# Patient Record
Sex: MALE | Race: WHITE | NOT HISPANIC OR LATINO
[De-identification: names, ages, dates, MRNs, and addresses within clinical notes are randomized per-mention and may not be internally consistent; named-entity substitution may affect disease eponyms.]

---

## 2018-03-09 PROBLEM — Z00.00 ENCOUNTER FOR PREVENTIVE HEALTH EXAMINATION: Status: ACTIVE | Noted: 2018-03-09

## 2018-04-06 ENCOUNTER — APPOINTMENT (OUTPATIENT)
Dept: OPHTHALMOLOGY | Facility: CLINIC | Age: 78
End: 2018-04-06
Payer: MEDICARE

## 2018-04-06 PROCEDURE — 92014 COMPRE OPH EXAM EST PT 1/>: CPT

## 2018-04-13 ENCOUNTER — APPOINTMENT (OUTPATIENT)
Dept: OPHTHALMOLOGY | Facility: CLINIC | Age: 78
End: 2018-04-13
Payer: MEDICARE

## 2018-04-13 DIAGNOSIS — H00.015 HORDEOLUM EXTERNUM LEFT LOWER EYELID: ICD-10-CM

## 2018-04-13 PROCEDURE — 92012 INTRM OPH EXAM EST PATIENT: CPT

## 2018-11-08 ENCOUNTER — APPOINTMENT (OUTPATIENT)
Dept: OPHTHALMOLOGY | Facility: CLINIC | Age: 78
End: 2018-11-08
Payer: MEDICARE

## 2018-11-08 DIAGNOSIS — H04.123 DRY EYE SYNDROME OF BILATERAL LACRIMAL GLANDS: ICD-10-CM

## 2018-11-08 DIAGNOSIS — H25.013 CORTICAL AGE-RELATED CATARACT, BILATERAL: ICD-10-CM

## 2018-11-08 DIAGNOSIS — H35.3131 NONEXUDATIVE AGE-RELATED MACULAR DEGENERATION, BILATERAL, EARLY DRY STAGE: ICD-10-CM

## 2018-11-08 PROCEDURE — 92134 CPTRZ OPH DX IMG PST SGM RTA: CPT

## 2018-11-08 PROCEDURE — 92014 COMPRE OPH EXAM EST PT 1/>: CPT

## 2018-11-14 ENCOUNTER — TRANSCRIPTION ENCOUNTER (OUTPATIENT)
Age: 78
End: 2018-11-14

## 2019-05-16 ENCOUNTER — APPOINTMENT (OUTPATIENT)
Dept: OPHTHALMOLOGY | Facility: CLINIC | Age: 79
End: 2019-05-16
Payer: MEDICARE

## 2019-05-16 ENCOUNTER — NON-APPOINTMENT (OUTPATIENT)
Age: 79
End: 2019-05-16

## 2019-05-16 PROCEDURE — 92014 COMPRE OPH EXAM EST PT 1/>: CPT

## 2019-05-16 PROCEDURE — 92134 CPTRZ OPH DX IMG PST SGM RTA: CPT

## 2019-12-02 ENCOUNTER — NON-APPOINTMENT (OUTPATIENT)
Age: 79
End: 2019-12-02

## 2019-12-02 ENCOUNTER — APPOINTMENT (OUTPATIENT)
Dept: OPHTHALMOLOGY | Facility: CLINIC | Age: 79
End: 2019-12-02
Payer: MEDICARE

## 2019-12-02 PROCEDURE — 92014 COMPRE OPH EXAM EST PT 1/>: CPT

## 2019-12-02 PROCEDURE — 92134 CPTRZ OPH DX IMG PST SGM RTA: CPT

## 2020-05-07 ENCOUNTER — APPOINTMENT (OUTPATIENT)
Dept: OPHTHALMOLOGY | Facility: CLINIC | Age: 80
End: 2020-05-07

## 2021-04-06 ENCOUNTER — NON-APPOINTMENT (OUTPATIENT)
Age: 81
End: 2021-04-06

## 2021-04-19 ENCOUNTER — NON-APPOINTMENT (OUTPATIENT)
Age: 81
End: 2021-04-19

## 2021-04-19 ENCOUNTER — APPOINTMENT (OUTPATIENT)
Dept: OPHTHALMOLOGY | Facility: CLINIC | Age: 81
End: 2021-04-19
Payer: MEDICARE

## 2021-04-19 PROCEDURE — 92014 COMPRE OPH EXAM EST PT 1/>: CPT

## 2022-01-22 ENCOUNTER — NON-APPOINTMENT (OUTPATIENT)
Age: 82
End: 2022-01-22

## 2022-04-20 ENCOUNTER — APPOINTMENT (OUTPATIENT)
Dept: OPHTHALMOLOGY | Facility: CLINIC | Age: 82
End: 2022-04-20
Payer: MEDICARE

## 2022-04-20 ENCOUNTER — NON-APPOINTMENT (OUTPATIENT)
Age: 82
End: 2022-04-20

## 2022-04-20 PROCEDURE — 92014 COMPRE OPH EXAM EST PT 1/>: CPT

## 2022-05-17 ENCOUNTER — APPOINTMENT (OUTPATIENT)
Dept: OPHTHALMOLOGY | Facility: CLINIC | Age: 82
End: 2022-05-17
Payer: MEDICARE

## 2022-05-17 ENCOUNTER — NON-APPOINTMENT (OUTPATIENT)
Age: 82
End: 2022-05-17

## 2022-05-17 PROCEDURE — 99199 UNLISTED SPECIAL SVC PX/RPRT: CPT | Mod: NC

## 2022-07-16 ENCOUNTER — NON-APPOINTMENT (OUTPATIENT)
Age: 82
End: 2022-07-16

## 2022-07-27 ENCOUNTER — APPOINTMENT (OUTPATIENT)
Dept: OPHTHALMOLOGY | Facility: CLINIC | Age: 82
End: 2022-07-27

## 2022-07-27 ENCOUNTER — NON-APPOINTMENT (OUTPATIENT)
Age: 82
End: 2022-07-27

## 2022-07-27 PROCEDURE — 99199 UNLISTED SPECIAL SVC PX/RPRT: CPT | Mod: NC

## 2022-12-07 ENCOUNTER — NON-APPOINTMENT (OUTPATIENT)
Age: 82
End: 2022-12-07

## 2023-01-20 ENCOUNTER — APPOINTMENT (OUTPATIENT)
Dept: OPHTHALMOLOGY | Facility: CLINIC | Age: 83
End: 2023-01-20
Payer: MEDICARE

## 2023-01-20 ENCOUNTER — NON-APPOINTMENT (OUTPATIENT)
Age: 83
End: 2023-01-20

## 2023-01-20 PROCEDURE — 92133 CPTRZD OPH DX IMG PST SGM ON: CPT

## 2023-01-20 PROCEDURE — 92014 COMPRE OPH EXAM EST PT 1/>: CPT

## 2023-04-26 ENCOUNTER — APPOINTMENT (OUTPATIENT)
Dept: OPHTHALMOLOGY | Facility: CLINIC | Age: 83
End: 2023-04-26

## 2023-05-20 ENCOUNTER — EMERGENCY (EMERGENCY)
Facility: HOSPITAL | Age: 83
LOS: 1 days | Discharge: HOME | End: 2023-05-20
Attending: EMERGENCY MEDICINE | Admitting: EMERGENCY MEDICINE
Payer: MEDICARE

## 2023-05-20 VITALS
SYSTOLIC BLOOD PRESSURE: 125 MMHG | TEMPERATURE: 99 F | WEIGHT: 218.04 LBS | HEIGHT: 69 IN | OXYGEN SATURATION: 97 % | DIASTOLIC BLOOD PRESSURE: 76 MMHG | RESPIRATION RATE: 17 BRPM | HEART RATE: 76 BPM

## 2023-05-20 VITALS
RESPIRATION RATE: 18 BRPM | SYSTOLIC BLOOD PRESSURE: 126 MMHG | TEMPERATURE: 99 F | HEART RATE: 72 BPM | OXYGEN SATURATION: 98 % | DIASTOLIC BLOOD PRESSURE: 78 MMHG

## 2023-05-20 DIAGNOSIS — Z79.84 LONG TERM (CURRENT) USE OF ORAL HYPOGLYCEMIC DRUGS: ICD-10-CM

## 2023-05-20 DIAGNOSIS — Z79.02 LONG TERM (CURRENT) USE OF ANTITHROMBOTICS/ANTIPLATELETS: ICD-10-CM

## 2023-05-20 DIAGNOSIS — E11.9 TYPE 2 DIABETES MELLITUS WITHOUT COMPLICATIONS: ICD-10-CM

## 2023-05-20 DIAGNOSIS — E78.5 HYPERLIPIDEMIA, UNSPECIFIED: ICD-10-CM

## 2023-05-20 DIAGNOSIS — H53.8 OTHER VISUAL DISTURBANCES: ICD-10-CM

## 2023-05-20 DIAGNOSIS — Z86.73 PERSONAL HISTORY OF TRANSIENT ISCHEMIC ATTACK (TIA), AND CEREBRAL INFARCTION WITHOUT RESIDUAL DEFICITS: ICD-10-CM

## 2023-05-20 DIAGNOSIS — I10 ESSENTIAL (PRIMARY) HYPERTENSION: ICD-10-CM

## 2023-05-20 LAB
ANION GAP SERPL CALC-SCNC: 12 MMOL/L — SIGNIFICANT CHANGE UP (ref 5–17)
APTT BLD: 31 SEC — SIGNIFICANT CHANGE UP (ref 27.5–35.5)
BASOPHILS # BLD AUTO: 0.02 K/UL — SIGNIFICANT CHANGE UP (ref 0–0.2)
BASOPHILS NFR BLD AUTO: 0.4 % — SIGNIFICANT CHANGE UP (ref 0–2)
BUN SERPL-MCNC: 17 MG/DL — SIGNIFICANT CHANGE UP (ref 7–23)
CALCIUM SERPL-MCNC: 8.6 MG/DL — SIGNIFICANT CHANGE UP (ref 8.4–10.5)
CHLORIDE SERPL-SCNC: 105 MMOL/L — SIGNIFICANT CHANGE UP (ref 96–108)
CO2 SERPL-SCNC: 22 MMOL/L — SIGNIFICANT CHANGE UP (ref 22–31)
CREAT SERPL-MCNC: 1.31 MG/DL — HIGH (ref 0.5–1.3)
EGFR: 55 ML/MIN/1.73M2 — LOW
EOSINOPHIL # BLD AUTO: 0.04 K/UL — SIGNIFICANT CHANGE UP (ref 0–0.5)
EOSINOPHIL NFR BLD AUTO: 0.8 % — SIGNIFICANT CHANGE UP (ref 0–6)
GLUCOSE SERPL-MCNC: 103 MG/DL — HIGH (ref 70–99)
HCT VFR BLD CALC: 39.3 % — SIGNIFICANT CHANGE UP (ref 39–50)
HGB BLD-MCNC: 13.5 G/DL — SIGNIFICANT CHANGE UP (ref 13–17)
IMM GRANULOCYTES NFR BLD AUTO: 0.4 % — SIGNIFICANT CHANGE UP (ref 0–0.9)
INR BLD: 1.07 — SIGNIFICANT CHANGE UP (ref 0.88–1.16)
LYMPHOCYTES # BLD AUTO: 0.91 K/UL — LOW (ref 1–3.3)
LYMPHOCYTES # BLD AUTO: 17.8 % — SIGNIFICANT CHANGE UP (ref 13–44)
MCHC RBC-ENTMCNC: 32.5 PG — SIGNIFICANT CHANGE UP (ref 27–34)
MCHC RBC-ENTMCNC: 34.4 GM/DL — SIGNIFICANT CHANGE UP (ref 32–36)
MCV RBC AUTO: 94.7 FL — SIGNIFICANT CHANGE UP (ref 80–100)
MONOCYTES # BLD AUTO: 0.26 K/UL — SIGNIFICANT CHANGE UP (ref 0–0.9)
MONOCYTES NFR BLD AUTO: 5.1 % — SIGNIFICANT CHANGE UP (ref 2–14)
NEUTROPHILS # BLD AUTO: 3.87 K/UL — SIGNIFICANT CHANGE UP (ref 1.8–7.4)
NEUTROPHILS NFR BLD AUTO: 75.5 % — SIGNIFICANT CHANGE UP (ref 43–77)
NRBC # BLD: 0 /100 WBCS — SIGNIFICANT CHANGE UP (ref 0–0)
PLATELET # BLD AUTO: 164 K/UL — SIGNIFICANT CHANGE UP (ref 150–400)
POTASSIUM SERPL-MCNC: 4.3 MMOL/L — SIGNIFICANT CHANGE UP (ref 3.5–5.3)
POTASSIUM SERPL-SCNC: 4.3 MMOL/L — SIGNIFICANT CHANGE UP (ref 3.5–5.3)
PROTHROM AB SERPL-ACNC: 12.8 SEC — SIGNIFICANT CHANGE UP (ref 10.5–13.4)
RBC # BLD: 4.15 M/UL — LOW (ref 4.2–5.8)
RBC # FLD: 12.7 % — SIGNIFICANT CHANGE UP (ref 10.3–14.5)
SODIUM SERPL-SCNC: 139 MMOL/L — SIGNIFICANT CHANGE UP (ref 135–145)
TROPONIN T SERPL-MCNC: 0.01 NG/ML — SIGNIFICANT CHANGE UP (ref 0–0.01)
WBC # BLD: 5.12 K/UL — SIGNIFICANT CHANGE UP (ref 3.8–10.5)
WBC # FLD AUTO: 5.12 K/UL — SIGNIFICANT CHANGE UP (ref 3.8–10.5)

## 2023-05-20 PROCEDURE — 85610 PROTHROMBIN TIME: CPT

## 2023-05-20 PROCEDURE — 82962 GLUCOSE BLOOD TEST: CPT

## 2023-05-20 PROCEDURE — 70450 CT HEAD/BRAIN W/O DYE: CPT | Mod: MG

## 2023-05-20 PROCEDURE — 85025 COMPLETE CBC W/AUTO DIFF WBC: CPT

## 2023-05-20 PROCEDURE — 84484 ASSAY OF TROPONIN QUANT: CPT

## 2023-05-20 PROCEDURE — 80048 BASIC METABOLIC PNL TOTAL CA: CPT

## 2023-05-20 PROCEDURE — 36415 COLL VENOUS BLD VENIPUNCTURE: CPT

## 2023-05-20 PROCEDURE — G1004: CPT

## 2023-05-20 PROCEDURE — 70498 CT ANGIOGRAPHY NECK: CPT | Mod: MG

## 2023-05-20 PROCEDURE — 70496 CT ANGIOGRAPHY HEAD: CPT | Mod: MG

## 2023-05-20 PROCEDURE — 99284 EMERGENCY DEPT VISIT MOD MDM: CPT | Mod: FS

## 2023-05-20 PROCEDURE — 70498 CT ANGIOGRAPHY NECK: CPT | Mod: 26,MG

## 2023-05-20 PROCEDURE — 70496 CT ANGIOGRAPHY HEAD: CPT | Mod: 26,MG

## 2023-05-20 PROCEDURE — 70450 CT HEAD/BRAIN W/O DYE: CPT | Mod: 26,MG,XE

## 2023-05-20 PROCEDURE — 85730 THROMBOPLASTIN TIME PARTIAL: CPT

## 2023-05-20 PROCEDURE — 99285 EMERGENCY DEPT VISIT HI MDM: CPT | Mod: 25

## 2023-05-20 NOTE — ED ADULT NURSE NOTE - NSFALLHARMRISKINTERV_ED_ALL_ED

## 2023-05-20 NOTE — ED PROVIDER NOTE - ATTENDING APP SHARED VISIT CONTRIBUTION OF CARE
81 y/o m with PMH of DM, HLD, TIA on plavix presents to ED with left eye vision changes since 10am.  Pt states he went to bed at 1am and woke at 10am with feeling of film over left eye.  Denies pain, headache, nausea, vomiting, flashes of light, curtain over vision.  No unilateral weakness or numbness.  Pt states he was dx with early glaucoma but not on drops.    PE - well appearing left eye not injected EOMI pressure 12-14, PERRL, no periorbital tenderness, no tearing, no focal deficits on exam  Alert & Oriented x 3. CN II-XII intact. No facial droop. Clear speech. OLMOS w/ 5/5 strength x 4 ext. Normal sensation. No pronator drift. No dysdidokinesia nor dysmetria. Normal heel-to-shin.    Plan for labs, CT head, CTA head/neck.    Symptoms most likely c.w intraocular issue as pt has no other sign of stroke on exam  Pt has follow up with neuro Tuesday and encouraged to see optho as outpt.  Signed out to Dr. Edouard pending CTA head/neck.

## 2023-05-20 NOTE — ED ADULT TRIAGE NOTE - RESPIRATORY RATE (BREATHS/MIN)
Your opinion matters! Thank you for choosing [facility] for your care. You might receive a survey through e-mail or text message about your experience today to evaluate our clinic. Please take a few minutes to complete the survey, the feedback is important.     It was a pleasure to talk with you today.     Yu Mace LCSW    19

## 2023-05-20 NOTE — ED PROVIDER NOTE - NEUROLOGICAL, MLM
Alert and oriented, no focal deficits, no motor or sensory deficits. + finger to nose, normal gait, romberg neg

## 2023-05-20 NOTE — ED PROVIDER NOTE - NSFOLLOWUPINSTRUCTIONS_ED_ALL_ED_FT
follow up with your neurologist as scheduled, please see your eye doctor as well   Visual Disturbances  An eye with a detached retina.  A visual disturbance is any problem that interferes with your normal vision. This can affect one eye or both eyes. Some types of visual disturbances come and go without treatment and do not cause a permanent problem. Other visual disturbances may be a sign of an eye emergency or a medical emergency.  Visual disturbances include:  Blurred vision.  Being unable to see certain colors.  Being sensitive to light.  Double vision in one eye or both eyes.  Partial vision loss (visual field deficit).  Being unaware of objects on one side of the body (visual spatial inattention).  Rhythmic eye movements that you cannot control (nystagmus).  Short-term or long-term blindness.  Seeing:  Floating spots or lines (floaters).  Flashing or shimmering lights.  Zigzagging lines or stars.  The floor as tilted (visual midline shift).  Things that are not really there (hallucinations).  Causes of visual disturbances include:  Dry eyes.  Eye infection.  The thin membrane at the back of the eye  from the eyeball (retinal detachment).  High blood pressure.  Migraine.  Glaucoma.  Ischemic stroke.  Cerebral aneurysm.  Diabetes.  It is important to get your eyes checked by a health care provider or eye care specialist (ophthalmologist or optometrist) as soon as possible to determine the cause of your visual disturbance.  Follow these instructions at home:  Take over-the-counter and prescription medicines only as told by your health care provider.  Do not use any products that contain nicotine or tobacco. These products include cigarettes, chewing tobacco, and vaping devices, such as e-cigarettes. If you need help quitting, ask your health care provider.  To lower your risk of the problems that can lead to visual disturbances:  Eat a balanced diet that includes fruits and vegetables, whole grains, lean meat, and low-fat dairy.  Maintain a healthy weight. Work with your health care provider to lose weight if you need to.  Exercise regularly. Ask your health care provider what activities are safe for you.  Do not drive if you have trouble seeing. Ask your health care provider for guidance about when it is and is not safe for you to drive.  Keep all follow-up visits. This is important.  Contact a health care provider if:  Your visual disturbance changes or becomes worse.  Get help right away if:  A sign showing the "BE FAST" categories for the warning signs of a stroke: balance, eyes, face, arms, speech, and time.  You have new visual disturbances.  You suddenly see flashing lights or floaters.  You suddenly have a dark area in your field of vision, especially in the lower part. This can lead to a loss of central vision.  You suddenly lose vision in one or both eyes.  You have any symptoms of a stroke. "BE FAST" is an easy way to remember the main warning signs of a stroke:  B - Balance. Signs are dizziness, sudden trouble walking, or loss of balance.  E - Eyes. Signs are trouble seeing or a sudden change in vision.  F - Face. Signs are sudden weakness or numbness of the face, or the face or eyelid drooping on one side.  A - Arms. Signs are weakness or numbness in an arm. This happens suddenly and usually on one side of the body.  S - Speech. Signs are sudden trouble speaking, slurred speech, or trouble understanding what people say.  T - Time. Time to call emergency services. Write down what time symptoms started.  Have other signs of a stroke, such as:  A sudden, severe headache with no known cause.  Nausea or vomiting.  A seizure.  These symptoms may represent a serious problem that is an emergency. Do not wait to see if the symptoms will go away. Get medical help right away. Call your local emergency services (911 in the [place] Do not drive yourself to the hospital.  Summary  A visual disturbance is any problem that interferes with your normal vision.  It is important to get your eyes checked by a health care provider or eye care specialist to determine what kind of visual disturbance you have.  Some visual disturbances may be a sign of an eye emergency or medical emergency.

## 2023-05-20 NOTE — ED PROVIDER NOTE - NS ED ATTENDING STATEMENT MOD
This was a shared visit with the EMILIA. I reviewed and verified the documentation and independently performed the documented:

## 2023-05-20 NOTE — ED PROVIDER NOTE - PATIENT PORTAL LINK FT
You can access the FollowMyHealth Patient Portal offered by [facility] by registering at the following website: [email or web address]. By joining [facility]’s FollowMyHealth portal, you will also be able to view your health information using other applications (apps) compatible with our system.

## 2023-05-20 NOTE — ED PROVIDER NOTE - EYES, MLM
Clear bilaterally, pupils equal, round and reactive to light. + EOM b/l, no conjunctival injection or tearing b/l

## 2023-05-20 NOTE — ED PROVIDER NOTE - OBJECTIVE STATEMENT
The pt is a 83 y/o M, who presents to ED c/o "blurry vision" to L eye since 10 am - pt states that has had some visual disturbances on/off x 1wk, but today symptoms are persistent. Pt defines blurry as fuzzy - not double, no loss of visual fields. PMH HTN, HLD, DM, TIA (plavix). Denies h/a, visual loss, hearing loss, gait disturbances, n/v/d, abd pain, cp, sob.

## 2023-05-20 NOTE — ED ADULT TRIAGE NOTE - CHIEF COMPLAINT QUOTE
"I woke up at 10:30 with blurry vision to my left eye and it has not gone away". Patient denies weakness, dizziness, BEFAST is negative.

## 2023-05-20 NOTE — ED ADULT NURSE NOTE - OBJECTIVE STATEMENT
Pt with pmx of bilateral senile cataract, DM, neuropathy presented to ED with c/o of new onset blurred vision to the left eye since this am. BEFAST negative. Last eye exam in January. AOX4. VSS.  Patient denies chest pain, discomfort, shortness of breath, difficulty breathing and any form of distress not noted. Patient oriented to ED area. All needs attended. ECG done in triage. Rounding in progress. Fall risk precautions maintained.

## 2023-05-20 NOTE — ED PROVIDER NOTE - CLINICAL SUMMARY MEDICAL DECISION MAKING FREE TEXT BOX
I got a hold of mom at 1:41pm pre-reg and screening complete. Thank you   pt c/o blurry vision to L eye - able to read ID badge w/o dif, no eye pain nor h/a - do not suspect acute glaucoma, no visual loss - doubt retinal detachment, non focal neuro exam but given TIA hx - stroke w/u done -- neg, pt has neuro f/u in place in 2 d and understands to keep the appointment, also understands to see his optho. stable for dc, pt understands and agrees w/plan, strict return precautions given

## 2023-06-06 ENCOUNTER — APPOINTMENT (OUTPATIENT)
Dept: OPHTHALMOLOGY | Facility: CLINIC | Age: 83
End: 2023-06-06
Payer: MEDICARE

## 2023-06-06 ENCOUNTER — NON-APPOINTMENT (OUTPATIENT)
Age: 83
End: 2023-06-06

## 2023-06-06 PROCEDURE — 92012 INTRM OPH EXAM EST PATIENT: CPT

## 2023-08-11 ENCOUNTER — INPATIENT (INPATIENT)
Facility: HOSPITAL | Age: 83
LOS: 6 days | Discharge: NURSING, REHAB OR LONG-TERM CARE | DRG: 312 | End: 2023-08-18
Attending: PSYCHIATRY & NEUROLOGY | Admitting: HOSPITALIST
Payer: MEDICARE

## 2023-08-11 VITALS
DIASTOLIC BLOOD PRESSURE: 78 MMHG | HEART RATE: 92 BPM | RESPIRATION RATE: 16 BRPM | SYSTOLIC BLOOD PRESSURE: 119 MMHG | OXYGEN SATURATION: 96 % | TEMPERATURE: 100 F

## 2023-08-11 LAB
ALBUMIN SERPL ELPH-MCNC: 4.2 G/DL — SIGNIFICANT CHANGE UP (ref 3.3–5)
ALP SERPL-CCNC: 86 U/L — SIGNIFICANT CHANGE UP (ref 40–120)
ALT FLD-CCNC: 10 U/L — SIGNIFICANT CHANGE UP (ref 10–45)
ANION GAP SERPL CALC-SCNC: 16 MMOL/L — SIGNIFICANT CHANGE UP (ref 5–17)
APTT BLD: 35.1 SEC — SIGNIFICANT CHANGE UP (ref 24.5–35.6)
AST SERPL-CCNC: 19 U/L — SIGNIFICANT CHANGE UP (ref 10–40)
BASOPHILS # BLD AUTO: 0.02 K/UL — SIGNIFICANT CHANGE UP (ref 0–0.2)
BASOPHILS NFR BLD AUTO: 0.3 % — SIGNIFICANT CHANGE UP (ref 0–2)
BILIRUB SERPL-MCNC: 0.7 MG/DL — SIGNIFICANT CHANGE UP (ref 0.2–1.2)
BUN SERPL-MCNC: 20 MG/DL — SIGNIFICANT CHANGE UP (ref 7–23)
CALCIUM SERPL-MCNC: 9.5 MG/DL — SIGNIFICANT CHANGE UP (ref 8.4–10.5)
CHLORIDE SERPL-SCNC: 104 MMOL/L — SIGNIFICANT CHANGE UP (ref 96–108)
CO2 SERPL-SCNC: 19 MMOL/L — LOW (ref 22–31)
CREAT SERPL-MCNC: 1.4 MG/DL — HIGH (ref 0.5–1.3)
EGFR: 51 ML/MIN/1.73M2 — LOW
EOSINOPHIL # BLD AUTO: 0.04 K/UL — SIGNIFICANT CHANGE UP (ref 0–0.5)
EOSINOPHIL NFR BLD AUTO: 0.7 % — SIGNIFICANT CHANGE UP (ref 0–6)
GLUCOSE SERPL-MCNC: 95 MG/DL — SIGNIFICANT CHANGE UP (ref 70–99)
HCT VFR BLD CALC: 37.6 % — LOW (ref 39–50)
HGB BLD-MCNC: 13.5 G/DL — SIGNIFICANT CHANGE UP (ref 13–17)
IMM GRANULOCYTES NFR BLD AUTO: 0.3 % — SIGNIFICANT CHANGE UP (ref 0–0.9)
INR BLD: 1.1 — SIGNIFICANT CHANGE UP (ref 0.85–1.18)
LYMPHOCYTES # BLD AUTO: 0.9 K/UL — LOW (ref 1–3.3)
LYMPHOCYTES # BLD AUTO: 15.7 % — SIGNIFICANT CHANGE UP (ref 13–44)
MCHC RBC-ENTMCNC: 34.3 PG — HIGH (ref 27–34)
MCHC RBC-ENTMCNC: 35.9 GM/DL — SIGNIFICANT CHANGE UP (ref 32–36)
MCV RBC AUTO: 95.4 FL — SIGNIFICANT CHANGE UP (ref 80–100)
MONOCYTES # BLD AUTO: 0.68 K/UL — SIGNIFICANT CHANGE UP (ref 0–0.9)
MONOCYTES NFR BLD AUTO: 11.8 % — SIGNIFICANT CHANGE UP (ref 2–14)
NEUTROPHILS # BLD AUTO: 4.09 K/UL — SIGNIFICANT CHANGE UP (ref 1.8–7.4)
NEUTROPHILS NFR BLD AUTO: 71.2 % — SIGNIFICANT CHANGE UP (ref 43–77)
NRBC # BLD: 0 /100 WBCS — SIGNIFICANT CHANGE UP (ref 0–0)
PLATELET # BLD AUTO: 152 K/UL — SIGNIFICANT CHANGE UP (ref 150–400)
POTASSIUM SERPL-MCNC: 4.3 MMOL/L — SIGNIFICANT CHANGE UP (ref 3.5–5.3)
POTASSIUM SERPL-SCNC: 4.3 MMOL/L — SIGNIFICANT CHANGE UP (ref 3.5–5.3)
PROT SERPL-MCNC: 7 G/DL — SIGNIFICANT CHANGE UP (ref 6–8.3)
PROTHROM AB SERPL-ACNC: 12.5 SEC — SIGNIFICANT CHANGE UP (ref 9.5–13)
RBC # BLD: 3.94 M/UL — LOW (ref 4.2–5.8)
RBC # FLD: 12.7 % — SIGNIFICANT CHANGE UP (ref 10.3–14.5)
SODIUM SERPL-SCNC: 139 MMOL/L — SIGNIFICANT CHANGE UP (ref 135–145)
TROPONIN T, HIGH SENSITIVITY RESULT: 23 NG/L — SIGNIFICANT CHANGE UP (ref 0–51)
WBC # BLD: 5.75 K/UL — SIGNIFICANT CHANGE UP (ref 3.8–10.5)
WBC # FLD AUTO: 5.75 K/UL — SIGNIFICANT CHANGE UP (ref 3.8–10.5)

## 2023-08-11 PROCEDURE — 0042T: CPT

## 2023-08-11 PROCEDURE — 70498 CT ANGIOGRAPHY NECK: CPT | Mod: 26,MA

## 2023-08-11 PROCEDURE — 70496 CT ANGIOGRAPHY HEAD: CPT | Mod: 26,MA

## 2023-08-11 PROCEDURE — 70450 CT HEAD/BRAIN W/O DYE: CPT | Mod: 26,59,MA

## 2023-08-11 PROCEDURE — 99291 CRITICAL CARE FIRST HOUR: CPT

## 2023-08-11 RX ORDER — SODIUM CHLORIDE 9 MG/ML
10 INJECTION INTRAMUSCULAR; INTRAVENOUS; SUBCUTANEOUS ONCE
Refills: 0 | Status: COMPLETED | OUTPATIENT
Start: 2023-08-11 | End: 2023-08-11

## 2023-08-11 RX ORDER — DEXTROSE 50 % IN WATER 50 %
15 SYRINGE (ML) INTRAVENOUS ONCE
Refills: 0 | Status: DISCONTINUED | OUTPATIENT
Start: 2023-08-11 | End: 2023-08-16

## 2023-08-11 RX ORDER — DEXTROSE 50 % IN WATER 50 %
25 SYRINGE (ML) INTRAVENOUS ONCE
Refills: 0 | Status: DISCONTINUED | OUTPATIENT
Start: 2023-08-11 | End: 2023-08-16

## 2023-08-11 RX ORDER — SODIUM CHLORIDE 9 MG/ML
1000 INJECTION, SOLUTION INTRAVENOUS
Refills: 0 | Status: DISCONTINUED | OUTPATIENT
Start: 2023-08-11 | End: 2023-08-16

## 2023-08-11 RX ORDER — CHLORHEXIDINE GLUCONATE 213 G/1000ML
1 SOLUTION TOPICAL
Refills: 0 | Status: DISCONTINUED | OUTPATIENT
Start: 2023-08-11 | End: 2023-08-13

## 2023-08-11 RX ORDER — CHOLECALCIFEROL (VITAMIN D3) 125 MCG
1000 CAPSULE ORAL DAILY
Refills: 0 | Status: DISCONTINUED | OUTPATIENT
Start: 2023-08-12 | End: 2023-08-18

## 2023-08-11 RX ORDER — ACETAMINOPHEN 500 MG
650 TABLET ORAL EVERY 6 HOURS
Refills: 0 | Status: DISCONTINUED | OUTPATIENT
Start: 2023-08-11 | End: 2023-08-18

## 2023-08-11 RX ORDER — DEXTROSE 50 % IN WATER 50 %
12.5 SYRINGE (ML) INTRAVENOUS ONCE
Refills: 0 | Status: DISCONTINUED | OUTPATIENT
Start: 2023-08-11 | End: 2023-08-16

## 2023-08-11 RX ORDER — ATORVASTATIN CALCIUM 80 MG/1
1 TABLET, FILM COATED ORAL
Refills: 0 | DISCHARGE

## 2023-08-11 RX ORDER — GABAPENTIN 400 MG/1
1 CAPSULE ORAL
Refills: 0 | DISCHARGE

## 2023-08-11 RX ORDER — BUPROPION HYDROCHLORIDE 150 MG/1
1 TABLET, EXTENDED RELEASE ORAL
Refills: 0 | DISCHARGE

## 2023-08-11 RX ORDER — GLUCAGON INJECTION, SOLUTION 0.5 MG/.1ML
1 INJECTION, SOLUTION SUBCUTANEOUS ONCE
Refills: 0 | Status: DISCONTINUED | OUTPATIENT
Start: 2023-08-11 | End: 2023-08-16

## 2023-08-11 RX ORDER — CALCIUM CARBONATE 500(1250)
2 TABLET ORAL
Refills: 0 | DISCHARGE

## 2023-08-11 RX ORDER — GABAPENTIN 400 MG/1
800 CAPSULE ORAL
Refills: 0 | Status: DISCONTINUED | OUTPATIENT
Start: 2023-08-12 | End: 2023-08-18

## 2023-08-11 RX ORDER — BUPROPION HYDROCHLORIDE 150 MG/1
300 TABLET, EXTENDED RELEASE ORAL DAILY
Refills: 0 | Status: DISCONTINUED | OUTPATIENT
Start: 2023-08-12 | End: 2023-08-18

## 2023-08-11 RX ORDER — TAMSULOSIN HYDROCHLORIDE 0.4 MG/1
0.4 CAPSULE ORAL AT BEDTIME
Refills: 0 | Status: DISCONTINUED | OUTPATIENT
Start: 2023-08-11 | End: 2023-08-18

## 2023-08-11 RX ORDER — TENECTEPLASE 50 MG
24 KIT INTRAVENOUS ONCE
Refills: 0 | Status: COMPLETED | OUTPATIENT
Start: 2023-08-11 | End: 2023-08-11

## 2023-08-11 RX ORDER — CALCIUM CARBONATE 500(1250)
1 TABLET ORAL DAILY
Refills: 0 | Status: DISCONTINUED | OUTPATIENT
Start: 2023-08-12 | End: 2023-08-18

## 2023-08-11 RX ADMIN — TENECTEPLASE 3456 MILLIGRAM(S): KIT at 18:30

## 2023-08-11 RX ADMIN — SODIUM CHLORIDE 10 MILLILITER(S): 9 INJECTION INTRAMUSCULAR; INTRAVENOUS; SUBCUTANEOUS at 18:30

## 2023-08-11 RX ADMIN — TAMSULOSIN HYDROCHLORIDE 0.4 MILLIGRAM(S): 0.4 CAPSULE ORAL at 22:52

## 2023-08-11 NOTE — H&P ADULT - NSHPLABSRESULTS_GEN_ALL_CORE
< from: CT Angio Brain Stroke Protocol  w/ IV Cont (08.11.23 @ 18:13) >    No interval change from 5/20/23. No intracranial or extracranial arterial   steno-occlusive disease.      < from: CT Brain Perfusion Maps Stroke (08.11.23 @ 18:10) >    Negative CT perfusion of the brain        < from: CT Angio Neck Stroke Protocol w/ IV Cont (08.11.23 @ 18:10) >    No interval change from 5/20/23. No intracranial or extracranial arterial   steno-occlusive disease.        < from: CT Brain Stroke Protocol (08.11.23 @ 18:07) >    No acute intracranial hemorrhage or acute territorial infarct.        Sodium: 139 mmol/L  Potassium: 4.3 mmol/L  Chloride: 104 mmol/L  Carbon Dioxide: 19 mmol/L  Anion Gap: 16 mmol/L  Blood Urea Nitrogen: 20 mg/dL  Creatinine: 1.40 mg/dL  Glucose: 95 mg/dL  Calcium: 9.5 mg/dL  Protein Total: 7.0 g/dL  Albumin: 4.2 g/dL  Bilirubin Total: 0.7 mg/dL  Alkaline Phosphatase: 86 U/L  Aspartate Aminotransferase (AST/SGOT): 19 U/L  Alanine Aminotransferase (ALT/SGPT): 10 U/L  eGFR: 51: The estimated glomerular filtration rate (eGFR)      12 Lead ECG (05.20.23 @ 15:32)   Ventricular Rate 66 BPM    Atrial Rate 66 BPM    P-R Interval 202 ms    QRS Duration 80 ms    Q-T Interval 400 ms    QTC Calculation(Bazett) 419 ms    P Axis 72 degrees    R Axis 19 degrees    T Axis 57 degrees    Diagnosis Line Normal sinus rhythm  Low voltage QRS  Nonspecific T wave abnormality

## 2023-08-11 NOTE — ED ADULT NURSE REASSESSMENT NOTE - NS ED NURSE REASSESS COMMENT FT1
Pt transferred to . Admitting MD and RN Serena made aware pt has hematoma to back of head and worsening hematoma to R forearm with abrasion to back. Pt reports improvement in bilateral leg strength while transferring from stretcher to bed. Stroke Code handoff tool utilized with RN.

## 2023-08-11 NOTE — ED ADULT NURSE NOTE - NURSING ED SKIN COLOR
normal for race Peng Advancement Flap Text: The defect edges were debeveled with a #15 scalpel blade.  Given the location of the defect, shape of the defect and the proximity to free margins a Peng advancement flap was deemed most appropriate.  Using a sterile surgical marker, an appropriate advancement flap was drawn incorporating the defect and placing the expected incisions within the relaxed skin tension lines where possible. The area thus outlined was incised deep to adipose tissue with a #15 scalpel blade.  The skin margins were undermined to an appropriate distance in all directions utilizing iris scissors.

## 2023-08-11 NOTE — ED PROVIDER NOTE - CRITICAL CARE ATTENDING CONTRIBUTION TO CARE
Javon Amador MD:    Seen as clinical upgrade for code stroke.     Due to a high probability of clinically significant, life threatening deterioration, the patient required my highest level of preparedness to intervene emergently and I personally spent this critical care time directly and personally managing the patient. This critical care time included obtaining a history; examining the patient; pulse oximetry; ordering and review of studies; arranging urgent treatment with development of a management plan; evaluation of patient's response to treatment; frequent reassessment; and, discussions with other providers.  This critical care time was performed to assess and manage the high probability of imminent, life-threatening deterioration that could result in multi-organ failure. It was exclusive of separately billable procedures and treating other patients and teaching time.

## 2023-08-11 NOTE — ED PROVIDER NOTE - CLINICAL SUMMARY MEDICAL DECISION MAKING FREE TEXT BOX
82 year old male with history of HTN, HLD, DM, chronic dizziness and b/l ankle numbness, presenting as code stroke for difficulty ambulating. LKN ~3:15 pm, patient reliable historian, well appearing at this time with good vitals, code stroke called on patient arrival by EMS by charge RN, seen emergently by myself and stroke PA and sent to CT imaging which were nonactionable. Patient noted to have significantly unsteady gait still, discussed r/b/a of tenecteplase administration by stroke PA and I at bedside, patient understanding of potential risk to include possible catastrophic ICH, makes informed decision to proceed with IV thrombolysis. TBA. 82 year old male with history of HTN, HLD, DM, chronic dizziness and b/l ankle numbness, presenting as code stroke for difficulty ambulating. LKN ~3:15 pm, patient reliable historian, well appearing at this time with good vitals, code stroke called on patient arrival by charge RN, seen emergently by myself and stroke PA and sent to CT imaging which were nonactionable. Patient noted to have significantly unsteady gait still, discussed r/b/a of tenecteplase administration by stroke PA and I at bedside, patient understanding of potential risk to include possible catastrophic ICH, makes informed decision to proceed with IV thrombolysis. TBA.

## 2023-08-11 NOTE — ED PROVIDER NOTE - PHYSICAL EXAMINATION
28-Aug-2019 20:56 Gen - NAD; well-appearing; A+Ox3   HEENT - NCAT, EOMI  Neck - supple  Resp - CTAB, no increased WOB  CV -  RRR, no m/r/g  Abd - soft, NT, ND; no guarding or rebound  MSK - FROM of b/l UE and LE, no gross deformities  Extrem - b/l 1+ LE edema without erythema/tenderness  Neuro - decreased sensation to LT over ankles b/l but otherwise no focal motor/sensation deficit, +unsteady gait, no slurred speech or facial droop  Skin - warm, well perfused

## 2023-08-11 NOTE — ED ADULT NURSE NOTE - NSFALLHARMRISKINTERV_ED_ALL_ED

## 2023-08-11 NOTE — ED ADULT NURSE REASSESSMENT NOTE - NS ED NURSE REASSESS COMMENT FT1
TNK administered by stroke PA. Pt VSS. See ED flowsheets for VS/NIH/PING documentation. Pt A&ox4, on monitor, high falls risks precautions in place.

## 2023-08-11 NOTE — STROKE CODE NOTE - ER ARRIVAL: DATE/TIME
Speech Therapy      Visit Type: Re-evaluation  -  Swallow and communication/cognition  Reason for consult: Per EMR: 65 year old female with hypothyroidism, obesity s/p gastric sleeve, seizure disorder, DVT on Coumadin, OA, HOSSEIN, anxiety with panic attacks who has been nonambulatory for months and has had multiple recent hospitalizations.  She had extensive GI workup for recurrent emesis including CT of the abdomen and head, gastric emptying study, and EGD which was unrevealing.  She was admitted to [facility] on 10/25/22 with intractable emesis.  Urine culture showed VRE and pseudomonas.  The patient developed worsening confusion and lethargy.  She became pancytopenic and BM biopsy was performed by IR on 11/23. She also developed urinary retention with bilateral hydronephrosis. She was followed by PT but she participated poorly and her performance fluctuated from min A to max A for bed mobility. She was transferred to [facility] on   11/23 for a higher level of care. She wasfelt to have distributive shock. She has been covered with antibiotics.  MRI of the brain and EEG have remained unrevealing. She has been found to have low folate and other vitamin deficiencies including Vit B6 and copper. . She had a superficial posterior neck abscess drained on 11/25. She has gradually improved in level of alertness but remains confused. Neurology feels she has encephalopathy with severe neuropathy with hyporeflexia.    WBC within normal limits, afebrile.    Precautions     - Medical precautions:  swallowing precautions; standard precautions.      - Oxygen: room air.      - Basic: NG (bridled in place)    - Lines in chart and on patient reviewed; cautions maintained through out session    - Safety measures: bed rails    - Cognition:         • Expression is verbal.          • Following commands intact.          • Safety judgement impaired.          • Awareness of deficits impaired.          • Problem solving is impaired.    -  Affect/Behavior: alert, calm, pleasant and confused    Current Status:     - Diet: NG tube, puree/dysphagia 1 and nectar-thick liquids      - Feeding: does not feed self    SUBJECTIVE  Alert and cooperative; upright in bed. Patient agreed to participate in therapy this date.   Patient/family goals: unable to state   Pain at onset of session:     -  0/10     OBJECTIVE      Swallowing     Consistencies:  Thin Liquid (teaspoon):    - Oral: intact   - Pharyngeal: impaired, suspect decreased hyolaryngeal elevation and suspect delayed swallow response  Thin Liquid (cup):    - Oral: impaired, suspect premature spillage   - Pharyngeal: impaired, suspect delayed swallow response and suspect decreased hyolaryngeal elevation  Thin Liquid (straw):     - Oral: intact   - Pharyngeal: impaired, suspect delayed swallow response and suspect decreased hyolaryngeal elevation  Nectar Thick Liquid (teaspoon):    - Amount given: TSP/CUP?STRAW   - Oral: intact   - Pharyngeal: impaired, suspect delayed swallow response and suspect decreased hyolaryngeal elevation  Dysphagia 1/Puree:     - Oral: impaired, slow oral transit, reduced propulsion and reduced labial closure   - Pharyngeal: impaired, suspect decreased hyolaryngeal elevation and suspect delayed swallow response      Communication/Cognition  Orientation Level:    - Person: oriented    - Place: oriented with cues    - Month: oriented with cues    - Year: oriented with cues    - Reason for hospitalization: not oriented    - Length of stay: not oriented  Attention:      - Selective attention: moderate impairment (50-74% accuracy)    - Sustained attention: mild impairment (75-89% accuracy)  Expression-Verbal:     - Indicates needs (gesturally/verbally): moderate impairment (50-74% accuracy)    - Structured sentence formulation: moderate impairment (50-74% accuracy)  Naming:      - Confrontation: intact (>90% accuracy)    - Responsive: mild impairment (75-89% accuracy)  Auditory  Comprehension:      - Commands 1 unit: mild impairment (75-89% accuracy)    - Commands 2 unit: moderate impairment (50-74% accuracy) (75% accy wtih marked increased processing time requiring repetition)            ASSESSMENT  Impairments: swallowing, attention/concentration, orientation, memory, insight/awareness, verbal expression and auditory comprehension  Functional Limitations: eating/drinking and communication/cognition  Acute oral and suspected pharyngeal dysfunction in the setting of mental status, adequate for advancement to dysphagia 1/puree and thin liquids. Amenable to minimal PO trials this date despite max encouragement and unlikely to meet hydration/nutrition needs via oral route alone; continue to recommend short-term non-oral source of nutrition/hydration, medication administration. Additionally with persisting acute cognitive-communication impairments in the setting of Wernicke's encephalopathy syndrome c/b reductions in o/r, attention, verbal expression, auditory comprehension, and memory.    Progress: Improving as expected       • Rehab Potential: good     • Potential barriers to progress:  Current medical conditions and cognitive status     • Skilled therapy is required to address these limitations in attempt to maximize the patient's independence. and is required to establish safe means of nutrition, hydration and medication administration    Education:   - Present and not ready to learn: patient  Education provided during session:  - dysphagia and cognition  - Results of above outlined education: No evidence of learning    Patient at end of session:    - location: in bed    - safety measures: bed rails x4, equipment intact, call light within reach and lines intact    - hand off to: nurse    PLAN    Interventions:  Assess tolerance of least restrictive oral diet and communication/cognition therapy    Plan/Goal Agreement:  Patient unable to agree with goals and individualized plan of  care    RECOMMENDATIONS     -Diet:          *puree/dysphagia 1 and thin liquids    -Medication Administration:         *via feeding tube, with puree and crushed    -Feeding Guidelines:          *eat slowly only when alert, feed patient/total feeding assistance, small bites/sips, allow extra time to swallow, sit fully upright for all po intake and verbal cues to swallow    -Speech Reviewed Swallow:         *with patient/family, with clinical caregivers and feeding guidelines posted in room    -Communication Cognition:          *Patient continues to demonstrate acute communication and cognition deficits, will continue to follow.  Patient will require 24/7 supervision at discharge.  Patient would benefit from subacute rehab.      GOALS    Long Term Goals:     Pt will follow 2 step motoric directions w/80% acc given min-mod cues to facilitate improved auditory comprehension to participate in skilled ST - Progressing    Pt will answer orientation questions (x2-3) given min-mod cues to decrease level of confusion - Progressing    Pt will sustain attention to structured tx tasks for 3 min given less than 3 verbal redirection cues to facilitate improved dynamic attention for tx - Progressing    Pt will tolerate least restrictive diet with min cues for use of swallow precautions to minimize risk of aspiration as evidenced by stable cardiopulmonary status with PO intake.           Therapy procedure time and total treatment time can be found documented on the Time Entry flowsheet   11-Aug-2023 17:36

## 2023-08-11 NOTE — ED ADULT NURSE REASSESSMENT NOTE - NS ED NURSE REASSESS COMMENT FT1
Pt administered TNK by Stroke PA at 1830. Risks and benefits were discussed w pt by MD, pt verbalizes understanding. Q15min VS, CHASE, PING initiated. This RN handing off to MALI Mckay, bedside report given, next VS check initiated at handoff at 1845.

## 2023-08-11 NOTE — CONSULT NOTE ADULT - ASSESSMENT
82y Male with PMHx of TIA (dx 2 years ago, did not feel any symptoms, reports his PCP does not think he had a TIA but his neurologist did so he takes asa/plavix daily), BPH, type 1 DM, diabetic neuropathy, HLD presents to ED for gait ataxia starting 1530 today after having lunch. Walks with a cane at baseline and his gait was worse. Fell at 430pm, no LOC. Reports has chronic bilateral LE weakness and chronic dizziness. CT imaging unremarkable. Case discussed with Dr. Agustin prior to urgent intervention. Risk and benefits of tenecteplase were discussed with patient member including risk of symptomatic ICH/death. Decision was made that benefits outweighed risks and tenecteplase was administered at 1830.       1) Admit to MICU for post tenecteplase monitoring  - Please perform dysphagia screen now   - Once dysphagia screen passed, start atorvastatin 80 once daily  - Notify provider if patient passes dysphasgia screen able to have diet if no pending intervention  - Keep BP <180/105, notify provider if out of range  - Check every 15 minutes for first hour; every 30 minutes for the next 6 hours; hourly until 24 hours   - Minimize arterial and venous punctures, able to draw blood 4hr s/p tenecteplase  - Keep temp <100F and maintain glucose 140-180.   - Notify provider for "HR >100 or <55 or SaO2 <95%"  - Maintain strict bed rest for 12 hours with HOB <30 degrees  - After 12hr s/p tenecteplase, patient able to ambulate with assist    - Repeat CT head with any acute change in mental status.   - No antiplatelet, anticoagulation, and heparin sq until 24 hour CT head negative for bleed.     2) Labs: Obtain Hemoglobin A1c, Lipid profile set, and TSH    3) Other tests:  - Repeat CT head noncon 24 hours post tenecteplase to rule out bleed - 1830 on 8/12  - MRI brain without contrast   - echo with bubble, may eventually need ADRIANA  - PT/OT order  - Swallow evaluation if fails dysphagia screen  - SLP order if patient with dysarthria  - Stroke education    4)DVT ppx - SCDs (NO heparin SQ as post tenecteplase protocol)    Decision to give tenecteplase discussed with Dr. Agustin

## 2023-08-11 NOTE — H&P ADULT - ASSESSMENT
82y Male with PMHx of TIA (dx 2 years ago, did not feel any symptoms, reports his PCP does not think he had a TIA but his neurologist did so he takes asa/plavix daily), BPH, type 1 DM, diabetic neuropathy, HLD presents to ED for gait ataxia starting 1530 today after having lunch. Walks with a cane at baseline and his gait was worse. Fell at 430pm, no LOC. Reports has chronic bilateral LE weakness and chronic dizziness. CT imaging unremarkable. Case discussed with Dr. Agustin prior to urgent intervention. Risk and benefits of tenecteplase were discussed with patient member including risk of symptomatic ICH/death. Decision was made that benefits outweighed risks and tenecteplase was administered at 1830.       1) Admit to MICU for post tenecteplase monitoring  - Please perform dysphagia screen  - Once dysphagia screen passed, start atorvastatin 80 once daily  - Notify provider if patient passes dysphasgia screen able to have diet if no pending intervention  - Keep BP <180/105, notify provider if out of range  - Check every 15 minutes for first hour; every 30 minutes for the next 6 hours; hourly until 24 hours   - Minimize arterial and venous punctures, able to draw blood 4hr s/p tenecteplase  - Keep temp <100F and maintain glucose 140-180.   - Notify provider for "HR >100 or <55 or SaO2 <95%"  - Maintain strict bed rest for 12 hours with HOB <30 degrees  - After 12hr s/p tenecteplase, patient able to ambulate with assist    - Repeat CT head with any acute change in mental status.   - No antiplatelet, anticoagulation, and heparin sq until 24 hour CT head negative for bleed.     2) Labs: Obtain Hemoglobin A1c, Lipid profile set, and TSH    3) Other tests:  - Repeat CT head noncon 24 hours post tenecteplase to rule out bleed - 1830 on 8/12  - MRI brain without contrast   - echo with bubble, may eventually need ADRIANA  - PT/OT order  - Swallow evaluation if fails dysphagia screen  - SLP order if patient with dysarthria  - Stroke education    4)DVT ppx - SCDs (NO heparin SQ as post tenecteplase protocol)

## 2023-08-11 NOTE — H&P ADULT - NSHPSOURCEINFORD_GEN_ALL_CORE
Called [facility] and left voicemail that patient needs to schedule an appointment for medication (Zoloft) and depression follow up in order for medication to be refilled. Asked [facility] to schedule appointment after last refill and she did not.
Chart(s)/Patient

## 2023-08-11 NOTE — ED ADULT NURSE NOTE - OBJECTIVE STATEMENT
sudden onset Pt to ED BIBA reports "I could not walk when I stood up from lunch, I lost my balance and I fell and hit the back of my head". Stroke upgrade called on arrival to Dr Amador. Pt A&ox4, speaking in full sentences, VSS, FS 87. Pt with unsteady gait, reports normally ambulates using cane. B/l lower extremity weakness. No evident facial droop or word finding, pt follows all commands. PING intact. NIH score on arrival 5 d/t b/l LE weakness and chronic LE numbness. Pt denies HA, blurry vision, LOC, nausea, vomiting, dizziness. Pt immediately brought to CT scan. Falls precautions initiated on arrival.

## 2023-08-11 NOTE — H&P ADULT - NSHPPHYSICALEXAM_GEN_ALL_CORE
VITAL SIGNS:  T(C): 37.7 (08-11-23 @ 19:15), Max: 37.7 (08-11-23 @ 17:42)  T(F): 99.9 (08-11-23 @ 19:15), Max: 99.9 (08-11-23 @ 18:30)  HR: 68 (08-11-23 @ 21:00) (68 - 96)  BP: 129/62 (08-11-23 @ 21:00) (119/78 - 155/74)  BP(mean): 89 (08-11-23 @ 21:00) (87 - 108)  RR: 19 (08-11-23 @ 21:00) (16 - 26)  SpO2: 96% (08-11-23 @ 21:00) (95% - 97%)  Wt(kg): --    PHYSICAL EXAM:    Constitutional: WDWN resting comfortably in bed; NAD  Head: posterior head bruise  Neck: supple  Respiratory: CTA B/L  Cardiac: +S1/S2; RRR  Gastrointestinal: abdomen soft, NT/ND  Back: spine midline, no bony tenderness or step-offs; no CVAT B/L  Extremities: WWP, hematoma on right arm.   Musculoskeletal: NROM x4  Vascular: 2+ radial, femoral, DP/PT pulses B/L  Dermatologic: skin warm, dry and intact; no rashes, wounds, or scars  Neurologic: AAOx3 VITAL SIGNS:  T(C): 37.7 (08-11-23 @ 19:15), Max: 37.7 (08-11-23 @ 17:42)  T(F): 99.9 (08-11-23 @ 19:15), Max: 99.9 (08-11-23 @ 18:30)  HR: 68 (08-11-23 @ 21:00) (68 - 96)  BP: 129/62 (08-11-23 @ 21:00) (119/78 - 155/74)  BP(mean): 89 (08-11-23 @ 21:00) (87 - 108)  RR: 19 (08-11-23 @ 21:00) (16 - 26)  SpO2: 96% (08-11-23 @ 21:00) (95% - 97%)  Wt(kg): --    PHYSICAL EXAM:    Constitutional: resting comfortably in bed; NAD  Head: posterior head bruise  Neck: supple  Respiratory: CTA B/L  Cardiac: +S1/S2; RRR  Gastrointestinal: abdomen soft, NT/ND  Extremities: WWP, hematoma on right arm.   Musculoskeletal: NROM x4  Vascular: 2+ radial, femoral, DP/PT pulses B/L  Neurologic: AAOx3

## 2023-08-11 NOTE — ED ADULT TRIAGE NOTE - CHIEF COMPLAINT QUOTE
Pt states he woke up feeling dizzy today, was walking around and felt "unsteady like I was drunk," fell backwards and hit the back of his head. On Plavix. Denies LOC, vision changes, vomiting. LKW 0100.

## 2023-08-11 NOTE — H&P ADULT - HISTORY OF PRESENT ILLNESS
82y Male with PMHx of TIA (dx 2 years ago, did not feel any symptoms, reports his PCP does not think he had a TIA but his neurologist did so he takes asa/plavix daily), BPH, type 1 DM, diabetic neuropathy, HLD presents to ED for gait ataxia starting 1530 (8/11) after having lunch. Walks with a cane at baseline and his gait was worse. Fell at 430pm, no LOC. Reports has chronic bilateral LE weakness and chronic dizziness. CT imaging unremarkable. Decision was made that benefits outweighed risks and tenecteplase was administered at 1830.

## 2023-08-11 NOTE — CONSULT NOTE ADULT - SUBJECTIVE AND OBJECTIVE BOX
**STROKE CODE CONSULT NOTE**    Last known well time/Time of onset of symptoms: 1530 on 8/11/23    HPI: 82y Male with PMHx of TIA (dx 2 years ago, did not feel any symptoms, reports his PCP does not think he had a TIA but his neurologist did so he takes asa/plavix daily), BPH, type 1 DM, diabetic neuropathy, HLD presents to ED for gait ataxia starting 1530 today after having lunch. Walks with a cane at baseline and his gait was worse. Fell at 430pm, no LOC. Reports has chronic bilateral LE weakness and chronic dizziness. CT imaging unremarkable.      Case discussed with Dr. Agustin prior to urgent intervention. Risk and benefits of tenecteplase were discussed with patient member including risk of symptomatic ICH/death. Decision was made that benefits outweighed risks and tenecteplase was administered at 1830.         T(C): 37.7 (08-11-23 @ 19:15), Max: 37.7 (08-11-23 @ 17:42)  HR: 84 (08-11-23 @ 19:15) (82 - 96)  BP: 143/76 (08-11-23 @ 19:15) (119/78 - 155/74)  RR: 16 (08-11-23 @ 19:15) (16 - 18)  SpO2: 95% (08-11-23 @ 19:15) (95% - 96%)    PAST MEDICAL & SURGICAL HISTORY:  HTN (hypertension)      HLD (hyperlipidemia)      DM (diabetes mellitus)          FAMILY HISTORY:        ROS:   see HPI    MEDICATIONS  (STANDING):    MEDICATIONS  (PRN):    Allergies    No Known Allergies    Intolerances      Vital Signs Last 24 Hrs  T(C): 37.7 (11 Aug 2023 19:15), Max: 37.7 (11 Aug 2023 17:42)  T(F): 99.9 (11 Aug 2023 19:15), Max: 99.9 (11 Aug 2023 18:30)  HR: 84 (11 Aug 2023 19:15) (82 - 96)  BP: 143/76 (11 Aug 2023 19:15) (119/78 - 155/74)  BP(mean): --  RR: 16 (11 Aug 2023 19:15) (16 - 18)  SpO2: 95% (11 Aug 2023 19:15) (95% - 96%)    Parameters below as of 11 Aug 2023 19:15  Patient On (Oxygen Delivery Method): room air      Neurologic:  -Mental status: Awake, alert, oriented to person, place, and time. Speech is fluent with intact naming, repetition, and comprehension, no dysarthria. Recent and remote memory intact. Follows commands. Attention/concentration intact.   -Cranial nerves:   II: Visual fields are full to confrontation.  III, IV, VI: Extraocular movements are intact without nystagmus. Pupils equally round and reactive to light  V:  Facial sensation V1-V3 equal and intact   VII: Face is symmetric with normal eye closure and smile  VIII: Hearing is bilaterally intact to finger rub  IX, X: Uvula is midline and soft palate rises symmetrically  XI: Head turning and shoulder shrug are intact.  XII: Tongue protrudes midline  Motor: Normal bulk and tone. No pronator drift. Strength bilateral upper extremity 5/5, bilateral lower extremities 2/5 (chronic)  Sensation: chronic bilateral ankle numbness. No neglect or extinction on double simultaneous testing.  Coordination: No dysmetria on finger-to-nose bilaterally  Gait: unsteady    NIHSS: 5    Fingerstick Blood Glucose: CAPILLARY BLOOD GLUCOSE  89 (11 Aug 2023 19:30)      POCT Blood Glucose.: 89 mg/dL (11 Aug 2023 17:41)    LABS:                        13.5   5.75  )-----------( 152      ( 11 Aug 2023 18:05 )             37.6     08-11    139  |  104  |  20  ----------------------------<  95  4.3   |  19<L>  |  1.40<H>    Ca    9.5      11 Aug 2023 18:05    TPro  7.0  /  Alb  4.2  /  TBili  0.7  /  DBili  x   /  AST  19  /  ALT  10  /  AlkPhos  86  08-11    PT/INR - ( 11 Aug 2023 18:05 )   PT: 12.5 sec;   INR: 1.10          PTT - ( 11 Aug 2023 18:05 )  PTT:35.1 sec      Urinalysis Basic - ( 11 Aug 2023 18:05 )    Color: x / Appearance: x / SG: x / pH: x  Gluc: 95 mg/dL / Ketone: x  / Bili: x / Urobili: x   Blood: x / Protein: x / Nitrite: x   Leuk Esterase: x / RBC: x / WBC x   Sq Epi: x / Non Sq Epi: x / Bacteria: x        RADIOLOGY & ADDITIONAL STUDIES:  < from: CT Brain Stroke Protocol (08.11.23 @ 18:07) >  IMPRESSION:    No acute intracranial hemorrhage or acute territorial infarct.    < end of copied text >      < from: CT Angio Brain Stroke Protocol  w/ IV Cont (08.11.23 @ 18:13) >  IMPRESSION:    No interval change from 5/20/23. No intracranial or extracranial arterial   steno-occlusive disease.    < end of copied text >    < from: CT Brain Perfusion Maps Stroke (08.11.23 @ 18:10) >  IMPRESSION:    Negative CT perfusion of the brain    < end of copied text >    -----------------------------------------------------------------------------------------------------------------  IV-tenecteplase (Y/N):    yes                              Bolus time: 1830   tenecteplase Dose Verification w/ RN: yes

## 2023-08-12 LAB
A1C WITH ESTIMATED AVERAGE GLUCOSE RESULT: 5 % — SIGNIFICANT CHANGE UP (ref 4–5.6)
ANION GAP SERPL CALC-SCNC: 10 MMOL/L — SIGNIFICANT CHANGE UP (ref 5–17)
APPEARANCE UR: CLEAR — SIGNIFICANT CHANGE UP
BACTERIA # UR AUTO: ABNORMAL /HPF
BILIRUB UR-MCNC: NEGATIVE — SIGNIFICANT CHANGE UP
BUN SERPL-MCNC: 16 MG/DL — SIGNIFICANT CHANGE UP (ref 7–23)
CALCIUM SERPL-MCNC: 8.8 MG/DL — SIGNIFICANT CHANGE UP (ref 8.4–10.5)
CHLORIDE SERPL-SCNC: 101 MMOL/L — SIGNIFICANT CHANGE UP (ref 96–108)
CHOLEST SERPL-MCNC: 129 MG/DL — SIGNIFICANT CHANGE UP
CO2 SERPL-SCNC: 23 MMOL/L — SIGNIFICANT CHANGE UP (ref 22–31)
COLOR SPEC: YELLOW — SIGNIFICANT CHANGE UP
CREAT SERPL-MCNC: 1.31 MG/DL — HIGH (ref 0.5–1.3)
DIFF PNL FLD: ABNORMAL
EGFR: 55 ML/MIN/1.73M2 — LOW
EPI CELLS # UR: SIGNIFICANT CHANGE UP /HPF (ref 0–5)
ESTIMATED AVERAGE GLUCOSE: 97 MG/DL — SIGNIFICANT CHANGE UP (ref 68–114)
GLUCOSE BLDC GLUCOMTR-MCNC: 79 MG/DL — SIGNIFICANT CHANGE UP (ref 70–99)
GLUCOSE BLDC GLUCOMTR-MCNC: 85 MG/DL — SIGNIFICANT CHANGE UP (ref 70–99)
GLUCOSE BLDC GLUCOMTR-MCNC: 92 MG/DL — SIGNIFICANT CHANGE UP (ref 70–99)
GLUCOSE SERPL-MCNC: 81 MG/DL — SIGNIFICANT CHANGE UP (ref 70–99)
GLUCOSE UR QL: NEGATIVE — SIGNIFICANT CHANGE UP
HCT VFR BLD CALC: 35.8 % — LOW (ref 39–50)
HDLC SERPL-MCNC: 36 MG/DL — LOW
HGB BLD-MCNC: 12 G/DL — LOW (ref 13–17)
HYALINE CASTS # UR AUTO: ABNORMAL /LPF (ref 0–2)
KETONES UR-MCNC: 15 MG/DL
LEUKOCYTE ESTERASE UR-ACNC: NEGATIVE — SIGNIFICANT CHANGE UP
LIPID PNL WITH DIRECT LDL SERPL: 76 MG/DL — SIGNIFICANT CHANGE UP
MAGNESIUM SERPL-MCNC: 1.8 MG/DL — SIGNIFICANT CHANGE UP (ref 1.6–2.6)
MCHC RBC-ENTMCNC: 31.5 PG — SIGNIFICANT CHANGE UP (ref 27–34)
MCHC RBC-ENTMCNC: 33.5 GM/DL — SIGNIFICANT CHANGE UP (ref 32–36)
MCV RBC AUTO: 94 FL — SIGNIFICANT CHANGE UP (ref 80–100)
NITRITE UR-MCNC: NEGATIVE — SIGNIFICANT CHANGE UP
NON HDL CHOLESTEROL: 93 MG/DL — SIGNIFICANT CHANGE UP
NRBC # BLD: 0 /100 WBCS — SIGNIFICANT CHANGE UP (ref 0–0)
PH UR: 5.5 — SIGNIFICANT CHANGE UP (ref 5–8)
PHOSPHATE SERPL-MCNC: 3 MG/DL — SIGNIFICANT CHANGE UP (ref 2.5–4.5)
PLATELET # BLD AUTO: 135 K/UL — LOW (ref 150–400)
POTASSIUM SERPL-MCNC: 4 MMOL/L — SIGNIFICANT CHANGE UP (ref 3.5–5.3)
POTASSIUM SERPL-SCNC: 4 MMOL/L — SIGNIFICANT CHANGE UP (ref 3.5–5.3)
PROT UR-MCNC: ABNORMAL MG/DL
RBC # BLD: 3.81 M/UL — LOW (ref 4.2–5.8)
RBC # FLD: 12.8 % — SIGNIFICANT CHANGE UP (ref 10.3–14.5)
RBC CASTS # UR COMP ASSIST: ABNORMAL /HPF
SODIUM SERPL-SCNC: 134 MMOL/L — LOW (ref 135–145)
SP GR SPEC: >=1.03 — SIGNIFICANT CHANGE UP (ref 1–1.03)
TRIGL SERPL-MCNC: 85 MG/DL — SIGNIFICANT CHANGE UP
TSH SERPL-MCNC: 1.17 UIU/ML — SIGNIFICANT CHANGE UP (ref 0.27–4.2)
UROBILINOGEN FLD QL: 0.2 E.U./DL — SIGNIFICANT CHANGE UP
WBC # BLD: 4.39 K/UL — SIGNIFICANT CHANGE UP (ref 3.8–10.5)
WBC # FLD AUTO: 4.39 K/UL — SIGNIFICANT CHANGE UP (ref 3.8–10.5)
WBC UR QL: < 5 /HPF — SIGNIFICANT CHANGE UP

## 2023-08-12 PROCEDURE — 71045 X-RAY EXAM CHEST 1 VIEW: CPT | Mod: 26

## 2023-08-12 PROCEDURE — 99233 SBSQ HOSP IP/OBS HIGH 50: CPT

## 2023-08-12 PROCEDURE — 70450 CT HEAD/BRAIN W/O DYE: CPT | Mod: 26

## 2023-08-12 RX ORDER — CLOPIDOGREL BISULFATE 75 MG/1
75 TABLET, FILM COATED ORAL EVERY 24 HOURS
Refills: 0 | Status: DISCONTINUED | OUTPATIENT
Start: 2023-08-12 | End: 2023-08-13

## 2023-08-12 RX ORDER — ASPIRIN/CALCIUM CARB/MAGNESIUM 324 MG
81 TABLET ORAL EVERY 24 HOURS
Refills: 0 | Status: DISCONTINUED | OUTPATIENT
Start: 2023-08-12 | End: 2023-08-13

## 2023-08-12 RX ORDER — ENOXAPARIN SODIUM 100 MG/ML
40 INJECTION SUBCUTANEOUS EVERY 24 HOURS
Refills: 0 | Status: DISCONTINUED | OUTPATIENT
Start: 2023-08-12 | End: 2023-08-13

## 2023-08-12 RX ADMIN — BUPROPION HYDROCHLORIDE 300 MILLIGRAM(S): 150 TABLET, EXTENDED RELEASE ORAL at 11:52

## 2023-08-12 RX ADMIN — TAMSULOSIN HYDROCHLORIDE 0.4 MILLIGRAM(S): 0.4 CAPSULE ORAL at 21:22

## 2023-08-12 RX ADMIN — Medication 81 MILLIGRAM(S): at 23:25

## 2023-08-12 RX ADMIN — Medication 1000 UNIT(S): at 11:52

## 2023-08-12 RX ADMIN — Medication 650 MILLIGRAM(S): at 07:57

## 2023-08-12 RX ADMIN — GABAPENTIN 800 MILLIGRAM(S): 400 CAPSULE ORAL at 17:22

## 2023-08-12 RX ADMIN — ENOXAPARIN SODIUM 40 MILLIGRAM(S): 100 INJECTION SUBCUTANEOUS at 23:25

## 2023-08-12 RX ADMIN — CLOPIDOGREL BISULFATE 75 MILLIGRAM(S): 75 TABLET, FILM COATED ORAL at 23:25

## 2023-08-12 RX ADMIN — Medication 650 MILLIGRAM(S): at 05:21

## 2023-08-12 RX ADMIN — GABAPENTIN 800 MILLIGRAM(S): 400 CAPSULE ORAL at 05:12

## 2023-08-12 RX ADMIN — Medication 1 TABLET(S): at 11:52

## 2023-08-12 NOTE — PHYSICAL THERAPY INITIAL EVALUATION ADULT - GENERAL OBSERVATIONS, REHAB EVAL
Patient received semi-lee in bed  in NAD on RA, +ICU Telemetry, +Heplock. Cleared by MALI Baez. Agreeable to PT.

## 2023-08-12 NOTE — CONSULT NOTE ADULT - ASSESSMENT
MICU    82 y o Male with PMHx of TIA (dx 2 years ago, did not feel any symptoms, reports his PCP does not think he had a TIA but his neurologist did so he takes asa/plavix daily), BPH, type 1 DM, diabetic neuropathy, HLD presents to ED for gait ataxia starting 1530 today after having lunch. Walks with a cane at baseline and his gait was worse. Fell at 430pm, no LOC. Reports has chronic bilateral LE weakness and chronic dizziness. CT imaging unremarkable. Case discussed with Dr. Agustin prior to urgent intervention. Risk and benefits of tenecteplase were discussed with patient member including risk of symptomatic ICH/death. Decision was made that benefits outweighed risks and tenecteplase was administered at 1830.     1) Admit to MICU for post tenecteplase monitoring  - Please perform dysphagia screen  - Once dysphagia screen passed, start atorvastatin 80 once daily  - Notify provider if patient passes dysphasgia screen able to have diet if no pending intervention  - Keep BP <180/105, notify provider if out of range  - Check every 15 minutes for first hour; every 30 minutes for the next 6 hours; hourly until 24 hours   - Minimize arterial and venous punctures, able to draw blood 4hr s/p tenecteplase  - Keep temp <100F and maintain glucose 140-180.   - Notify provider for "HR >100 or <55 or SaO2 <95%"  - Maintain strict bed rest for 12 hours with HOB <30 degrees  - After 12hr s/p tenecteplase, patient able to ambulate with assist    - Repeat CT head with any acute change in mental status.   - No antiplatelet, anticoagulation, and heparin sq until 24 hour CT head negative for bleed.     2) Labs: Obtain Hemoglobin A1c, Lipid profile set, and TSH    3) Other tests:  - Repeat CT head noncon 24 hours post tenecteplase to rule out bleed - 1830 on 8/12  - MRI brain without contrast   - echo with bubble, may eventually need ADRIANA  - PT/OT order  - Swallow evaluation if fails dysphagia screen  - SLP order if patient with dysarthria  - Stroke education    4)DVT ppx - SCDs (NO heparin SQ as post tenecteplase protocol)

## 2023-08-12 NOTE — PROGRESS NOTE ADULT - ASSESSMENT
INCOMPLETE    82y Male with PMHx of TIA (dx 2 years ago, did not feel any symptoms, reports his PCP does not think he had a TIA but his neurologist did so he takes asa/plavix daily), BPH, type 1 DM, diabetic neuropathy, HLD presents to ED for gait ataxia starting 1530 today after having lunch. Walks with a cane at baseline and his gait was worse. Fell at 430pm, no LOC. Reports has chronic bilateral LE weakness and chronic dizziness. CT imaging unremarkable. Case discussed with Dr. Agustin prior to urgent intervention. Risk and benefits of tenecteplase were discussed with patient member including risk of symptomatic ICH/death. Decision was made that benefits outweighed risks and tenecteplase was administered at 1830 on 8/10. 24h stability scan demonstrates ****************    Neuro  #CVA workup  - will start ASA 81mg and Plavix 75mg pending repeat NCHCT at 1830 tonight if negative for brain bleed  - continue atorvastatin 80mg daily  - q4hr stroke neuro checks and vitals  - obtain MRI Brain without contrast  - Stroke Code HCT Results: negative  - Stroke Code CTA Results: Small amount of calcified plaque at the cavernous segments of the bilateral internal carotid arteries. The anterior and middle cerebral arteries are patent at their 1st and 2nd order segments, and appear symmetric caliber. The posterior circulation shows no high grade stenosis or occlusion. Mildly hypoplastic right P1 segment with a prominent posterior communicating artery.Trace amount of calcified plaque in the right V4 segment. 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  - Stroke education    Cards  #HTN  - permissive hypertension, Goal -180  - obtain TTE    #HLD  - high dose statin as above in CVA  - LDL results: 76    Pulm  - call provider if SPO2 < 94%    GI  #Nutrition/Fluids/Electrolytes   - replete K<4 and Mg <2  - Diet: CCD    Renal  - daily BMP      Endocrine  #DM type 1  - A1C results: 5%  - ac/hs fingersticks    - TSH results: 1.170    DVT Prophylaxis  - lovenox sq for DVT prophylaxis to be initiated once 24h repeat NCHCT completed and negative for bleed  - SCDs for DVT prophylaxis       Dispo: pending PT/OT eval     Discussed daily hospital plans and goals with patient.    Discussed with Neurology Attending Dr. Nguyen INCOMPLETE    82y Male with PMHx of TIA (dx 2 years ago, did not feel any symptoms, reports his PCP does not think he had a TIA but his neurologist did so he takes asa/plavix daily), BPH, type 1 DM, diabetic neuropathy, HLD presents to ED for gait ataxia starting 1530 today after having lunch. Walks with a cane at baseline and his gait was worse. Fell at 430pm, no LOC. Reports has chronic bilateral LE weakness and chronic dizziness. CT imaging unremarkable. Case discussed with Dr. Agustin prior to urgent intervention. Risk and benefits of tenecteplase were discussed with patient member including risk of symptomatic ICH/death. Decision was made that benefits outweighed risks and tenecteplase was administered at 1830 on 8/10. 24h stability scan demonstrates ****************    Neuro  #CVA workup  - will start ASA 81mg and Plavix 75mg   - continue atorvastatin 80mg daily  - q4hr stroke neuro checks and vitals  - obtain MRI Brain without contrast  - Stroke Code HCT Results: negative  - Stroke Code CTA Results: Small amount of calcified plaque at the cavernous segments of the bilateral internal carotid arteries. The anterior and middle cerebral arteries are patent at their 1st and 2nd order segments, and appear symmetric caliber. The posterior circulation shows no high grade stenosis or occlusion. Mildly hypoplastic right P1 segment with a prominent posterior communicating artery.Trace amount of calcified plaque in the right V4 segment. 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  - Stroke education    Cards  #HTN  - permissive hypertension, Goal -180  - obtain TTE    #HLD  - high dose statin as above in CVA  - LDL results: 76    Pulm  - call provider if SPO2 < 94%    GI  #Nutrition/Fluids/Electrolytes   - replete K<4 and Mg <2  - Diet: CCD    Renal  - daily BMP      Endocrine  #DM type 1  - A1C results: 5%  - ac/hs fingersticks    - TSH results: 1.170    DVT Prophylaxis  - lovenox sq for DVT prophylaxis  - SCDs for DVT prophylaxis       Dispo: [facility] vs home PT     Discussed daily hospital plans and goals with patient.    Discussed with Neurology Attending Dr. Nguyen

## 2023-08-12 NOTE — PHYSICAL THERAPY INITIAL EVALUATION ADULT - MODALITIES TREATMENT COMMENTS
right hand dominant; (L) hand  5/5, (R) hand  5/5. CN Testing: B/L Frontalis intact; B/L buccinator intact; smile symmetrical; tongue protrusion at midline; B/L eyes open/close intact; Shoulder elevation: intact bilaterally; Vision H-Test: bilateral tracking and smooth pursuit intact; Convergence/Divergence: intact;

## 2023-08-12 NOTE — PHYSICAL THERAPY INITIAL EVALUATION ADULT - ADDITIONAL COMMENTS
Patient lives alone in elevator accessible apartment with 1 steps to enter in lobby. Ambulates with SC at baseline. Owns commode, shower chair, grab bars. Reports at least one fall a month 'on ground not including the falls I was able to catch myself' due to dizziness.

## 2023-08-12 NOTE — PHYSICAL THERAPY INITIAL EVALUATION ADULT - DIAGNOSIS, PT EVAL
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

## 2023-08-12 NOTE — PATIENT PROFILE ADULT - NSPROMEDSADMININFO_GEN_A_NUR
Nursing Transfer Note      4/8/2020     Transfer to [phone] from [phone]    Transfer via w/c    Transfer with meds and belongings    Transported by Chris MEYERS    Medicines sent: albuterol inh    Chart send with patient: YES     Notified: eRe MEYERS    Patient reassessed at: 1800 4/8/20       no concerns

## 2023-08-12 NOTE — PHYSICAL THERAPY INITIAL EVALUATION ADULT - NSPTDISCHREC_GEN_A_CORE
Inpatient Rehab vs Home with HPT pending full functional assessment and orthostatic hypotension resolution

## 2023-08-12 NOTE — PATIENT PROFILE ADULT - FALL HARM RISK - HARM RISK INTERVENTIONS

## 2023-08-12 NOTE — CONSULT NOTE ADULT - SUBJECTIVE AND OBJECTIVE BOX
Patient is a 82y old  Male who presents with a chief complaint of Dizziness s/p TNK (11 Aug 2023 20:25)      HPI:  82y Male with PMHx of TIA (dx 2 years ago, did not feel any symptoms, reports his PCP does not think he had a TIA but his neurologist did so he takes asa/plavix daily), BPH, type 1 DM, diabetic neuropathy, HLD presents to ED for gait ataxia starting 1530 (8/11) after having lunch. Walks with a cane at baseline and his gait was worse. Fell at 430pm, no LOC. Reports has chronic bilateral LE weakness and chronic dizziness. CT imaging unremarkable. Decision was made that benefits outweighed risks and tenecteplase was administered at 1830.      (11 Aug 2023 20:25)    PAST MEDICAL & SURGICAL HISTORY:  HTN (hypertension)      HLD (hyperlipidemia)      DM (diabetes mellitus)        MEDICATIONS  (STANDING):  buPROPion XL (24-Hour) . 300 milliGRAM(s) Oral daily  calcium carbonate    500 mG (Tums) Chewable 1 Tablet(s) Chew daily  chlorhexidine 4% Liquid 1 Application(s) Topical <User Schedule>  cholecalciferol 1000 Unit(s) Oral daily  dextrose 5%. 1000 milliLiter(s) (100 mL/Hr) IV Continuous <Continuous>  dextrose 5%. 1000 milliLiter(s) (50 mL/Hr) IV Continuous <Continuous>  dextrose 50% Injectable 12.5 Gram(s) IV Push once  dextrose 50% Injectable 25 Gram(s) IV Push once  dextrose 50% Injectable 25 Gram(s) IV Push once  gabapentin 800 milliGRAM(s) Oral two times a day  glucagon  Injectable 1 milliGRAM(s) IntraMuscular once  tamsulosin 0.4 milliGRAM(s) Oral at bedtime    MEDICATIONS  (PRN):  acetaminophen     Tablet .. 650 milliGRAM(s) Oral every 6 hours PRN Temp greater or equal to 38C (100.4F), Mild Pain (1 - 3)  dextrose Oral Gel 15 Gram(s) Oral once PRN Blood Glucose LESS THAN 70 milliGRAM(s)/deciliter              FAMILY HISTORY:      CBC Full  -  ( 12 Aug 2023 05:30 )  WBC Count : 4.39 K/uL  RBC Count : 3.81 M/uL  Hemoglobin : 12.0 g/dL  Hematocrit : 35.8 %  Platelet Count - Automated : 135 K/uL  Mean Cell Volume : 94.0 fl  Mean Cell Hemoglobin : 31.5 pg  Mean Cell Hemoglobin Concentration : 33.5 gm/dL  Auto Neutrophil # : x  Auto Lymphocyte # : x  Auto Monocyte # : x  Auto Eosinophil # : x  Auto Basophil # : x  Auto Neutrophil % : x  Auto Lymphocyte % : x  Auto Monocyte % : x  Auto Eosinophil % : x  Auto Basophil % : x      08-12    134<L>  |  101  |  16  ----------------------------<  81  4.0   |  23  |  1.31<H>    Ca    8.8      12 Aug 2023 05:30  Phos  3.0     08-12  Mg     1.8     08-12    TPro  7.0  /  Alb  4.2  /  TBili  0.7  /  DBili  x   /  AST  19  /  ALT  10  /  AlkPhos  86  08-11      Urinalysis Basic - ( 12 Aug 2023 05:30 )    Color: x / Appearance: x / SG: x / pH: x  Gluc: 81 mg/dL / Ketone: x  / Bili: x / Urobili: x   Blood: x / Protein: x / Nitrite: x   Leuk Esterase: x / RBC: x / WBC x   Sq Epi: x / Non Sq Epi: x / Bacteria: x    Radiology :       < from: CT Brain Stroke Protocol (08.11.23 @ 18:07) >  ACC: [id number] EXAM:  CT BRAIN STROKE PROTOCOL   ORDERED BY: MIRELLA CHING     PROCEDURE DATE:  08/11/2023          INTERPRETATION:  INDICATIONS: Stroke Code, unsteadiness.    TECHNIQUE:  Serial axial images were obtained from the skull base to the   vertex without the use of intravenous contrast.    COMPARISON EXAMINATION: CT head 5/20/2023    FINDINGS:    VENTRICLES AND SULCI: Age-appropriate minor volume loss. No hydrocephalus   or change from prior study.  INTRA-AXIAL: No intracranial mass, acute hemorrhage, or midline shift is   present. Gray-white matter differentiation preserved without evidence of   recent infarct.  EXTRA-AXIAL: No extra-axial fluid collection is present.  VISUALIZED SINUSES: No air-fluid levels are identified.  VISUALIZED MASTOIDS:  Clear.  CALVARIUM:  Intact. No evidence of acute fracture.    IMPRESSION:    No acute intracranial hemorrhage or acute territorial infarct.    < from: CT Brain Perfusion Maps Stroke (08.11.23 @ 18:10) >  ACC: [id number] EXAM:  CT BRAIN PERFUSION MAPS STROKE   ORDERED BY: MIRELLA CHING     PROCEDURE DATE:  08/11/2023          INTERPRETATION:  CT Perfusion    INDICATION: Stroke code, unsteadiness.    TECHNIQUE: After the bolus administration of 40 mL ofIsovue-370, serial   axial images were obtained through the brain. The CT perfusion data set   was post processed per [facility] protocol generating color maps of   CBF, CBV, MTT, and Tmax.    COMPARISON: CT head 5/20/2023    Following measurements were obtained on perfusion maps:  CBF <  30%: 0 mL  Tmax >6s: 0 mL  Mismatched volume: 0 mL  Mismatched ratio: 0    Patient motion during the scan and may limit sensitivity of the data.      IMPRESSION:    Negative CT perfusion of the brain    < from: CT Angio Brain Stroke Protocol  w/ IV Cont (08.11.23 @ 18:13) >  ACC: [id number] EXAM:  CT ANGIO BRAIN STROKE PROTC IC   ORDERED BY: MIRELLA CHING     ACC: [id number] EXAM:  CT ANGIO NECK STROKE PROTCL IC   ORDERED BY: MIRELLA CHING     PROCEDURE DATE:  08/11/2023          INTERPRETATION:  PROCEDURES:  CTA brain with intravenous contrast.  CTA neck with intravenous contrast.    INDICATION: Stroke code, unsteadiness    TECHNIQUE: Multiple axial thin section were obtained from the aortic arch   through the neck and intracranial compartment up to the calvarial vertex.   Imaging is done after the IV bolus of 80 cc Isovue 370. MIP series are   provided.    COMPARISON: CT angiogram of the neck 5/20/2023.    FINDINGS:    There is no interval change.    INTRACRANIAL:  The internal carotid arteries are patent at the skull base and   intracranial compartment without occlusion or high grade stenosis. Small   amount of calcified plaque at the cavernous segments of the bilateral   internal carotid arteries. The anterior and middle cerebral arteries are   patent at their 1st and 2nd order segments, and appear symmetric caliber.   The posterior circulation shows no high grade stenosis or occlusion.   Mildly hypoplastic right P1 segment with a prominent posterior   communicating artery. The intracranial vertebral arteries, the basilar   artery and both posterior cerebral arteries are patent and nonstenotic.   Trace amount of calcified plaque in the right V4 segment. Left vertebral   artery is hypoplastic. There is no site of aneurysm within the resolution   limitations of CTA.    EXTRACRANIAL:    The aortic arch is normal size and shows patency, with standard 3 vessel   configuration. No significant great vessel stenosis.    Both common carotid arteries are patent to the bifurcations. There are a   few small atherosclerotic calcifications at the bilateral carotid   bifurcations.    Left internal carotid artery is patent at the bifurcation without   hemodynamic significant stenosis, and otherwise widely patent up to the   skull base without stenosis or dissection.  Right internal carotid artery is patent at the bifurcation without   hemodynamic significant stenosis, and otherwise widely patent up to the   skull base without stenosis or dissection.    Vertebral arteries are patent at their origins and throughout their   course in the neck. The right side is dominant.      IMPRESSION:    No interval change from 5/20/23. No intracranial or extracranial arterial   steno-occlusive disease.      < end of copied text >    Review of Systems : per HPI               Vital Signs Last 24 Hrs  T(C): 37.9 (12 Aug 2023 06:11), Max: 37.9 (11 Aug 2023 22:14)  T(F): 100.3 (12 Aug 2023 06:11), Max: 100.3 (11 Aug 2023 22:14)  HR: 67 (12 Aug 2023 08:00) (62 - 96)  BP: 104/58 (12 Aug 2023 08:00) (104/58 - 155/74)  BP(mean): 78 (12 Aug 2023 08:00) (76 - 108)  RR: 23 (12 Aug 2023 08:00) (16 - 31)  SpO2: 94% (12 Aug 2023 08:00) (92% - 97%)    Parameters below as of 12 Aug 2023 08:00  Patient On (Oxygen Delivery Method): room air            Physical Exam :  in mici s/p TNK, 82 y o manlying comfortably in semi Stack's position , awake , alert , no acute complaints , feels tired     Head : normocephalic , atraumatic    Eyes : PERRLA , EOMI , no nystagmus , sclera anicteric    ENT / FACE : neg nasal discharge , uvula midline , no oropharyngeal erythema / exudate    Neck : supple , negative JVD , negative carotid bruits , no thyromegaly    Chest : CTA bilaterally , neg wheeze / rhonchi / rales / crackles / egophany    Cardiovascular : regular rate and rhythm , neg murmurs / rubs / gallops    Abdomen : soft , non distended , non tender to palpation in all 4 quadrants ,  normal bowel sounds     Extremities : WWP , neg cyanosis /clubbing / edema     Neurologic Exam :    Alert and oriented to person , place , date/year , speech fluent w/o dysarthria , follows commands , recent and remote memory intact , repetition intact , comprehension intact ,  attention/concentration intact , fund of knowledge appropriate    Cranial Nerves:     II :                         pupils equal , round and reactive to light , visual fields intact   III/ IV/VI :              extraocular movements intact , neg nystagmus , neg ptosis  V :                        facial sensation intact , V1-3 normal  VII :                      face symmetric , no droop , normal eye closure and smile  VIII :                     hearing intact to finger rub bilaterally  IX and X :             no hoarseness , gag intact , palate/ uvula rise symmetrically  XI :                       SCM / trapezius strength intact bilateral  XII :                      no tongue deviation    Motor Exam:          Right UE:                5/5 :     5/5 :   wrist extensors/ flexors  5/5 :   biceps  5/5 :   triceps  5/5 :   deltoid    negative pronator drift    Left UE:                  5/5 :     5/5 :   wrist extensors/ flexors  5/5 :   biceps  5/5 :   triceps  5/5 :   deltoid    negative pronator drift        Right LE:    5/5 : dorsiflexors   5/5 : plantar flexors  5/5 : quadriceps  5/5 : hamstrings  5/5 : hip flexors      Left LE:     5/5 : dorsiflexors   5/5 : plantar flexors  5/5 : quadriceps  5/5 : hamstrings  5/5 : hip flexors      Sensation :         intact to light touch x 4 extremities                            no neglect or extinction on double simultaneous testing      DTR :     biceps/brachioradialis : equal                      patella/ankle : equal     neg Babinski       Coordination :      Finger to Nose :  neg dysmetria bilaterally       Gait :  not tested          PM&R Impression :     admitted for c/o dizziness, s/p fall , s/p TNK    - no acute pathology on CT brain imaging , MRI pending      - no focal neurologic deficits         Recommendations / Plan :           1) Physical / Occupational therapy focusing on therapeutic exercises , equipment evaluation , bed mobility/transfer out of bed evaluation , progressive ambulation with assistive devices prn .    2) Current disposition plan recommendation  :    pending functional progress     Patient is a 82y old  Male who presents with a chief complaint of Dizziness s/p TNK (11 Aug 2023 20:25)      HPI:  82y Male with PMHx of TIA (dx 2 years ago, did not feel any symptoms, reports his PCP does not think he had a TIA but his neurologist did so he takes asa/plavix daily), BPH, type 1 DM, diabetic neuropathy, HLD presents to ED for gait ataxia starting 1530 (8/11) after having lunch. Walks with a cane at baseline and his gait was worse. Fell at 430pm, no LOC. Reports has chronic bilateral LE weakness and chronic dizziness. CT imaging unremarkable. Decision was made that benefits outweighed risks and tenecteplase was administered at 1830.      (11 Aug 2023 20:25)    PAST MEDICAL & SURGICAL HISTORY:  HTN (hypertension)      HLD (hyperlipidemia)      DM (diabetes mellitus)        MEDICATIONS  (STANDING):  buPROPion XL (24-Hour) . 300 milliGRAM(s) Oral daily  calcium carbonate    500 mG (Tums) Chewable 1 Tablet(s) Chew daily  chlorhexidine 4% Liquid 1 Application(s) Topical <User Schedule>  cholecalciferol 1000 Unit(s) Oral daily  dextrose 5%. 1000 milliLiter(s) (100 mL/Hr) IV Continuous <Continuous>  dextrose 5%. 1000 milliLiter(s) (50 mL/Hr) IV Continuous <Continuous>  dextrose 50% Injectable 12.5 Gram(s) IV Push once  dextrose 50% Injectable 25 Gram(s) IV Push once  dextrose 50% Injectable 25 Gram(s) IV Push once  gabapentin 800 milliGRAM(s) Oral two times a day  glucagon  Injectable 1 milliGRAM(s) IntraMuscular once  tamsulosin 0.4 milliGRAM(s) Oral at bedtime    MEDICATIONS  (PRN):  acetaminophen     Tablet .. 650 milliGRAM(s) Oral every 6 hours PRN Temp greater or equal to 38C (100.4F), Mild Pain (1 - 3)  dextrose Oral Gel 15 Gram(s) Oral once PRN Blood Glucose LESS THAN 70 milliGRAM(s)/deciliter              FAMILY HISTORY:      CBC Full  -  ( 12 Aug 2023 05:30 )  WBC Count : 4.39 K/uL  RBC Count : 3.81 M/uL  Hemoglobin : 12.0 g/dL  Hematocrit : 35.8 %  Platelet Count - Automated : 135 K/uL  Mean Cell Volume : 94.0 fl  Mean Cell Hemoglobin : 31.5 pg  Mean Cell Hemoglobin Concentration : 33.5 gm/dL  Auto Neutrophil # : x  Auto Lymphocyte # : x  Auto Monocyte # : x  Auto Eosinophil # : x  Auto Basophil # : x  Auto Neutrophil % : x  Auto Lymphocyte % : x  Auto Monocyte % : x  Auto Eosinophil % : x  Auto Basophil % : x      08-12    134<L>  |  101  |  16  ----------------------------<  81  4.0   |  23  |  1.31<H>    Ca    8.8      12 Aug 2023 05:30  Phos  3.0     08-12  Mg     1.8     08-12    TPro  7.0  /  Alb  4.2  /  TBili  0.7  /  DBili  x   /  AST  19  /  ALT  10  /  AlkPhos  86  08-11      Urinalysis Basic - ( 12 Aug 2023 05:30 )    Color: x / Appearance: x / SG: x / pH: x  Gluc: 81 mg/dL / Ketone: x  / Bili: x / Urobili: x   Blood: x / Protein: x / Nitrite: x   Leuk Esterase: x / RBC: x / WBC x   Sq Epi: x / Non Sq Epi: x / Bacteria: x    Radiology :       < from: CT Brain Stroke Protocol (08.11.23 @ 18:07) >  ACC: [id number] EXAM:  CT BRAIN STROKE PROTOCOL   ORDERED BY: MIRELLA CHING     PROCEDURE DATE:  08/11/2023          INTERPRETATION:  INDICATIONS: Stroke Code, unsteadiness.    TECHNIQUE:  Serial axial images were obtained from the skull base to the   vertex without the use of intravenous contrast.    COMPARISON EXAMINATION: CT head 5/20/2023    FINDINGS:    VENTRICLES AND SULCI: Age-appropriate minor volume loss. No hydrocephalus   or change from prior study.  INTRA-AXIAL: No intracranial mass, acute hemorrhage, or midline shift is   present. Gray-white matter differentiation preserved without evidence of   recent infarct.  EXTRA-AXIAL: No extra-axial fluid collection is present.  VISUALIZED SINUSES: No air-fluid levels are identified.  VISUALIZED MASTOIDS:  Clear.  CALVARIUM:  Intact. No evidence of acute fracture.    IMPRESSION:    No acute intracranial hemorrhage or acute territorial infarct.    < from: CT Brain Perfusion Maps Stroke (08.11.23 @ 18:10) >  ACC: [id number] EXAM:  CT BRAIN PERFUSION MAPS STROKE   ORDERED BY: MIRELLA CHING     PROCEDURE DATE:  08/11/2023          INTERPRETATION:  CT Perfusion    INDICATION: Stroke code, unsteadiness.    TECHNIQUE: After the bolus administration of 40 mL ofIsovue-370, serial   axial images were obtained through the brain. The CT perfusion data set   was post processed per [facility] protocol generating color maps of   CBF, CBV, MTT, and Tmax.    COMPARISON: CT head 5/20/2023    Following measurements were obtained on perfusion maps:  CBF <  30%: 0 mL  Tmax >6s: 0 mL  Mismatched volume: 0 mL  Mismatched ratio: 0    Patient motion during the scan and may limit sensitivity of the data.      IMPRESSION:    Negative CT perfusion of the brain    < from: CT Angio Brain Stroke Protocol  w/ IV Cont (08.11.23 @ 18:13) >  ACC: [id number] EXAM:  CT ANGIO BRAIN STROKE PROTC IC   ORDERED BY: MIRELLA CHING     ACC: [id number] EXAM:  CT ANGIO NECK STROKE PROTCL IC   ORDERED BY: MIRELLA CHING     PROCEDURE DATE:  08/11/2023          INTERPRETATION:  PROCEDURES:  CTA brain with intravenous contrast.  CTA neck with intravenous contrast.    INDICATION: Stroke code, unsteadiness    TECHNIQUE: Multiple axial thin section were obtained from the aortic arch   through the neck and intracranial compartment up to the calvarial vertex.   Imaging is done after the IV bolus of 80 cc Isovue 370. MIP series are   provided.    COMPARISON: CT angiogram of the neck 5/20/2023.    FINDINGS:    There is no interval change.    INTRACRANIAL:  The internal carotid arteries are patent at the skull base and   intracranial compartment without occlusion or high grade stenosis. Small   amount of calcified plaque at the cavernous segments of the bilateral   internal carotid arteries. The anterior and middle cerebral arteries are   patent at their 1st and 2nd order segments, and appear symmetric caliber.   The posterior circulation shows no high grade stenosis or occlusion.   Mildly hypoplastic right P1 segment with a prominent posterior   communicating artery. The intracranial vertebral arteries, the basilar   artery and both posterior cerebral arteries are patent and nonstenotic.   Trace amount of calcified plaque in the right V4 segment. Left vertebral   artery is hypoplastic. There is no site of aneurysm within the resolution   limitations of CTA.    EXTRACRANIAL:    The aortic arch is normal size and shows patency, with standard 3 vessel   configuration. No significant great vessel stenosis.    Both common carotid arteries are patent to the bifurcations. There are a   few small atherosclerotic calcifications at the bilateral carotid   bifurcations.    Left internal carotid artery is patent at the bifurcation without   hemodynamic significant stenosis, and otherwise widely patent up to the   skull base without stenosis or dissection.  Right internal carotid artery is patent at the bifurcation without   hemodynamic significant stenosis, and otherwise widely patent up to the   skull base without stenosis or dissection.    Vertebral arteries are patent at their origins and throughout their   course in the neck. The right side is dominant.      IMPRESSION:    No interval change from 5/20/23. No intracranial or extracranial arterial   steno-occlusive disease.      < end of copied text >    Review of Systems : per HPI               Vital Signs Last 24 Hrs  T(C): 37.9 (12 Aug 2023 06:11), Max: 37.9 (11 Aug 2023 22:14)  T(F): 100.3 (12 Aug 2023 06:11), Max: 100.3 (11 Aug 2023 22:14)  HR: 67 (12 Aug 2023 08:00) (62 - 96)  BP: 104/58 (12 Aug 2023 08:00) (104/58 - 155/74)  BP(mean): 78 (12 Aug 2023 08:00) (76 - 108)  RR: 23 (12 Aug 2023 08:00) (16 - 31)  SpO2: 94% (12 Aug 2023 08:00) (92% - 97%)    Parameters below as of 12 Aug 2023 08:00  Patient On (Oxygen Delivery Method): room air            Physical Exam :  in mici s/p TNK, 82 y o manlying comfortably in semi Stack's position , awake , alert , no acute complaints , feels tired     Head : normocephalic , occipit bruise w/o hematoma    Eyes : PERRLA , EOMI , no nystagmus , sclera anicteric    ENT / FACE : neg nasal discharge , uvula midline , no oropharyngeal erythema / exudate    Neck : supple , negative JVD , negative carotid bruits , no thyromegaly    Chest : CTA bilaterally , neg wheeze / rhonchi / rales / crackles / egophany    Cardiovascular : regular rate and rhythm , neg murmurs / rubs / gallops    Abdomen : soft , non distended , non tender to palpation in all 4 quadrants ,  normal bowel sounds     Extremities : WWP , neg cyanosis /clubbing / edema     Neurologic Exam :    Alert and oriented to person , place , date/year , speech fluent w/o dysarthria , follows commands , recent and remote memory intact , repetition intact , comprehension intact ,  attention/concentration intact , fund of knowledge appropriate    Cranial Nerves:     II :                         pupils equal , round and reactive to light , visual fields intact   III/ IV/VI :              extraocular movements intact , neg nystagmus , neg ptosis  V :                        facial sensation intact , V1-3 normal  VII :                      face symmetric , no droop , normal eye closure and smile  VIII :                     hearing intact to finger rub bilaterally  IX and X :             no hoarseness , gag intact , palate/ uvula rise symmetrically  XI :                       SCM / trapezius strength intact bilateral  XII :                      no tongue deviation    Motor Exam:          Right UE:                5/5 :     5/5 :   wrist extensors/ flexors  5/5 :   biceps  5/5 :   triceps  5/5 :   deltoid    negative pronator drift    Left UE:                  5/5 :     5/5 :   wrist extensors/ flexors  5/5 :   biceps  5/5 :   triceps  5/5 :   deltoid    negative pronator drift        Right LE:    5/5 : dorsiflexors   5/5 : plantar flexors  5/5 : quadriceps  5/5 : hamstrings  5/5 : hip flexors      Left LE:     5/5 : dorsiflexors   5/5 : plantar flexors  5/5 : quadriceps  5/5 : hamstrings  5/5 : hip flexors      Sensation :         intact to light touch x 4 extremities                            no neglect or extinction on double simultaneous testing      DTR :     biceps/brachioradialis : equal                      patella/ankle : equal     neg Babinski       Coordination :      Finger to Nose :  neg dysmetria bilaterally       Gait :  not tested          PM&R Impression :     admitted for c/o dizziness, s/p fall , s/p TNK    - no acute pathology on CT brain imaging , MRI pending      - no focal neurologic deficits         Recommendations / Plan :           1) Physical / Occupational therapy focusing on therapeutic exercises , equipment evaluation , bed mobility/transfer out of bed evaluation , progressive ambulation with assistive devices prn .    2) Current disposition plan recommendation  :    pending functional progress

## 2023-08-12 NOTE — PHYSICAL THERAPY INITIAL EVALUATION ADULT - LIGHT TOUCH SENSATION, RLE, REHAB EVAL
82y Male with PMHx of TIA (dx 2 years ago, did not feel any symptoms, reports his PCP does not think he had a TIA but his neurologist did so he takes asa/plavix daily), BPH, type 1 DM, diabetic neuropathy, HLD presents to ED for gait ataxia starting 1530 (8/11) after having lunch. Walks with a cane at baseline and his gait was worse. Fell at 430pm, no LOC. Reports has chronic bilateral LE weakness and chronic dizziness. CT imaging unremarkable. Decision was made that benefits outweighed risks and tenecteplase was administered at 1830./mild impairment

## 2023-08-12 NOTE — PROGRESS NOTE ADULT - SUBJECTIVE AND OBJECTIVE BOX
INCOMPLETE    ------------------------------TRANSFER RECEIVING NOTE FROM MICU TO STROKE/TELE--------------  HPI: 82y Male with PMHx of TIA (dx 2 years ago, did not feel any symptoms, reports his PCP does not think he had a TIA but his neurologist did so he takes asa/plavix daily), BPH, type 1 DM, diabetic neuropathy, HLD presents to ED for gait ataxia starting 1530 today after having lunch. Walks with a cane at baseline and his gait was worse. Fell at 430pm, no LOC. Reports has chronic bilateral LE weakness and chronic dizziness. CT imaging unremarkable.      Case discussed with Dr. Agustin prior to urgent intervention. Risk and benefits of tenecteplase were discussed with patient member including risk of symptomatic ICH/death. Decision was made that benefits outweighed risks and tenecteplase was administered at 1830 on 8/10. 24h NCHCT obtained and **************************    Neurology Stroke Progress Note    INTERVAL HPI/OVERNIGHT EVENTS:  Patient seen and examined.     MEDICATIONS  (STANDING):  buPROPion XL (24-Hour) . 300 milliGRAM(s) Oral daily  calcium carbonate    500 mG (Tums) Chewable 1 Tablet(s) Chew daily  chlorhexidine 4% Liquid 1 Application(s) Topical <User Schedule>  cholecalciferol 1000 Unit(s) Oral daily  dextrose 5%. 1000 milliLiter(s) (50 mL/Hr) IV Continuous <Continuous>  dextrose 5%. 1000 milliLiter(s) (100 mL/Hr) IV Continuous <Continuous>  dextrose 50% Injectable 12.5 Gram(s) IV Push once  dextrose 50% Injectable 25 Gram(s) IV Push once  dextrose 50% Injectable 25 Gram(s) IV Push once  gabapentin 800 milliGRAM(s) Oral two times a day  glucagon  Injectable 1 milliGRAM(s) IntraMuscular once  tamsulosin 0.4 milliGRAM(s) Oral at bedtime    MEDICATIONS  (PRN):  acetaminophen     Tablet .. 650 milliGRAM(s) Oral every 6 hours PRN Temp greater or equal to 38C (100.4F), Mild Pain (1 - 3)  dextrose Oral Gel 15 Gram(s) Oral once PRN Blood Glucose LESS THAN 70 milliGRAM(s)/deciliter      Allergies    No Known Allergies    Intolerances        Vital Signs Last 24 Hrs  T(C): 37.3 (12 Aug 2023 10:00), Max: 37.9 (11 Aug 2023 22:14)  T(F): 99.1 (12 Aug 2023 10:00), Max: 100.3 (11 Aug 2023 22:14)  HR: 74 (12 Aug 2023 12:00) (62 - 96)  BP: 112/62 (12 Aug 2023 12:00) (104/57 - 155/74)  BP(mean): 82 (12 Aug 2023 12:00) (76 - 108)  RR: 18 (12 Aug 2023 12:00) (16 - 31)  SpO2: 97% (12 Aug 2023 12:00) (92% - 97%)    Parameters below as of 12 Aug 2023 12:00  Patient On (Oxygen Delivery Method): room air        Physical exam:  General: No acute distress, awake and alert  Eyes: Anicteric sclerae, moist conjunctivae, see below for CNs  Neck: trachea midline, FROM, supple, no thyromegaly or lymphadenopathy  Cardiovascular: Regular rate and rhythm  Pulmonary: . No use of accessory muscles  GI: Abdomen soft, non-distended, non-tender  Extremities: Radial and DP pulses +2, no edema    Neurologic:  -Mental status: Awake, alert, oriented to person, place, and time. Speech is fluent with intact naming, repetition, and comprehension, no dysarthria. Recent and remote memory intact. Follows commands. Attention/concentration intact.   -Cranial nerves:   II: Visual fields are full to confrontation.  III, IV, VI: Extraocular movements are intact without nystagmus. Pupils equally round and reactive to light  V:  Facial sensation V1-V3 equal and intact   VII: Face is symmetric with normal eye closure and smile  VIII: Hearing is bilaterally intact to finger rub  IX, X: Uvula is midline and soft palate rises symmetrically  XI: Head turning and shoulder shrug are intact.  XII: Tongue protrudes midline  Motor: Normal bulk and tone. No pronator drift. Strength bilateral upper extremity 5/5, bilateral lower extremities 3/5   Sensation: chronic bilateral ankle numbness. No neglect or extinction on double simultaneous testing.  Coordination: No dysmetria on finger-to-nose bilaterally  Gait: unsteady    LABS:                        12.0   4.39  )-----------( 135      ( 12 Aug 2023 05:30 )             35.8     08-12    134<L>  |  101  |  16  ----------------------------<  81  4.0   |  23  |  1.31<H>    Ca    8.8      12 Aug 2023 05:30  Phos  3.0     08-12  Mg     1.8     08-12    TPro  7.0  /  Alb  4.2  /  TBili  0.7  /  DBili  x   /  AST  19  /  ALT  10  /  AlkPhos  86  08-11    PT/INR - ( 11 Aug 2023 18:05 )   PT: 12.5 sec;   INR: 1.10          PTT - ( 11 Aug 2023 18:05 )  PTT:35.1 sec  Urinalysis Basic - ( 12 Aug 2023 05:30 )    Color: x / Appearance: x / SG: x / pH: x  Gluc: 81 mg/dL / Ketone: x  / Bili: x / Urobili: x   Blood: x / Protein: x / Nitrite: x   Leuk Esterase: x / RBC: x / WBC x   Sq Epi: x / Non Sq Epi: x / Bacteria: x        RADIOLOGY & ADDITIONAL TESTS:     INCOMPLETE    ------------------------------TRANSFER RECEIVING NOTE FROM MICU TO STROKE/TELE--------------  HPI: 82y Male with PMHx of TIA (dx 2 years ago, did not feel any symptoms, reports his PCP does not think he had a TIA but his neurologist did so he takes asa/plavix daily), BPH, type 1 DM, diabetic neuropathy, HLD presents to ED for gait ataxia starting 1530 today after having lunch. Walks with a cane at baseline and his gait was worse. Fell at 430pm, no LOC. Reports has chronic bilateral LE weakness and chronic dizziness. CT imaging unremarkable.      Case discussed with Dr. Agustin prior to urgent intervention. Risk and benefits of tenecteplase were discussed with patient member including risk of symptomatic ICH/death. Decision was made that benefits outweighed risks and tenecteplase was administered at 1830 on 8/10. 24h NCHCT obtained, appears negative, pending official read.     Neurology Stroke Progress Note    INTERVAL HPI/OVERNIGHT EVENTS:  Patient seen and examined.     MEDICATIONS  (STANDING):  buPROPion XL (24-Hour) . 300 milliGRAM(s) Oral daily  calcium carbonate    500 mG (Tums) Chewable 1 Tablet(s) Chew daily  chlorhexidine 4% Liquid 1 Application(s) Topical <User Schedule>  cholecalciferol 1000 Unit(s) Oral daily  dextrose 5%. 1000 milliLiter(s) (50 mL/Hr) IV Continuous <Continuous>  dextrose 5%. 1000 milliLiter(s) (100 mL/Hr) IV Continuous <Continuous>  dextrose 50% Injectable 12.5 Gram(s) IV Push once  dextrose 50% Injectable 25 Gram(s) IV Push once  dextrose 50% Injectable 25 Gram(s) IV Push once  gabapentin 800 milliGRAM(s) Oral two times a day  glucagon  Injectable 1 milliGRAM(s) IntraMuscular once  tamsulosin 0.4 milliGRAM(s) Oral at bedtime    MEDICATIONS  (PRN):  acetaminophen     Tablet .. 650 milliGRAM(s) Oral every 6 hours PRN Temp greater or equal to 38C (100.4F), Mild Pain (1 - 3)  dextrose Oral Gel 15 Gram(s) Oral once PRN Blood Glucose LESS THAN 70 milliGRAM(s)/deciliter      Allergies    No Known Allergies    Intolerances        Vital Signs Last 24 Hrs  T(C): 37.3 (12 Aug 2023 10:00), Max: 37.9 (11 Aug 2023 22:14)  T(F): 99.1 (12 Aug 2023 10:00), Max: 100.3 (11 Aug 2023 22:14)  HR: 74 (12 Aug 2023 12:00) (62 - 96)  BP: 112/62 (12 Aug 2023 12:00) (104/57 - 155/74)  BP(mean): 82 (12 Aug 2023 12:00) (76 - 108)  RR: 18 (12 Aug 2023 12:00) (16 - 31)  SpO2: 97% (12 Aug 2023 12:00) (92% - 97%)    Parameters below as of 12 Aug 2023 12:00  Patient On (Oxygen Delivery Method): room air        Physical exam:  General: No acute distress, awake and alert  Eyes: Anicteric sclerae, moist conjunctivae, see below for CNs  Neck: trachea midline, FROM, supple, no thyromegaly or lymphadenopathy  Cardiovascular: Regular rate and rhythm  Pulmonary: . No use of accessory muscles  GI: Abdomen soft, non-distended, non-tender  Extremities: Radial and DP pulses +2, no edema    Neurologic:  -Mental status: Awake, alert, oriented to person, place, and time. Speech is fluent with intact naming, repetition, and comprehension, no dysarthria. Recent and remote memory intact. Follows commands. Attention/concentration intact.   -Cranial nerves:   II: Visual fields are full to confrontation.  III, IV, VI: Extraocular movements are intact without nystagmus. Pupils equally round and reactive to light  V:  Facial sensation V1-V3 equal and intact   VII: Face is symmetric with normal eye closure and smile  VIII: Hearing is bilaterally intact to finger rub  IX, X: Uvula is midline and soft palate rises symmetrically  XI: Head turning and shoulder shrug are intact.  XII: Tongue protrudes midline  Motor: Normal bulk and tone. No pronator drift. Strength bilateral upper extremity 5/5, bilateral lower extremities 3/5   Sensation: chronic bilateral ankle numbness. No neglect or extinction on double simultaneous testing.  Coordination: No dysmetria on finger-to-nose bilaterally  Gait: unsteady    LABS:                        12.0   4.39  )-----------( 135      ( 12 Aug 2023 05:30 )             35.8     08-12    134<L>  |  101  |  16  ----------------------------<  81  4.0   |  23  |  1.31<H>    Ca    8.8      12 Aug 2023 05:30  Phos  3.0     08-12  Mg     1.8     08-12    TPro  7.0  /  Alb  4.2  /  TBili  0.7  /  DBili  x   /  AST  19  /  ALT  10  /  AlkPhos  86  08-11    PT/INR - ( 11 Aug 2023 18:05 )   PT: 12.5 sec;   INR: 1.10          PTT - ( 11 Aug 2023 18:05 )  PTT:35.1 sec  Urinalysis Basic - ( 12 Aug 2023 05:30 )    Color: x / Appearance: x / SG: x / pH: x  Gluc: 81 mg/dL / Ketone: x  / Bili: x / Urobili: x   Blood: x / Protein: x / Nitrite: x   Leuk Esterase: x / RBC: x / WBC x   Sq Epi: x / Non Sq Epi: x / Bacteria: x        RADIOLOGY & ADDITIONAL TESTS:

## 2023-08-13 LAB
ANION GAP SERPL CALC-SCNC: 10 MMOL/L — SIGNIFICANT CHANGE UP (ref 5–17)
BASOPHILS # BLD AUTO: 0.01 K/UL — SIGNIFICANT CHANGE UP (ref 0–0.2)
BASOPHILS NFR BLD AUTO: 0.2 % — SIGNIFICANT CHANGE UP (ref 0–2)
BUN SERPL-MCNC: 18 MG/DL — SIGNIFICANT CHANGE UP (ref 7–23)
CALCIUM SERPL-MCNC: 8.6 MG/DL — SIGNIFICANT CHANGE UP (ref 8.4–10.5)
CHLORIDE SERPL-SCNC: 103 MMOL/L — SIGNIFICANT CHANGE UP (ref 96–108)
CO2 SERPL-SCNC: 22 MMOL/L — SIGNIFICANT CHANGE UP (ref 22–31)
CREAT SERPL-MCNC: 1.17 MG/DL — SIGNIFICANT CHANGE UP (ref 0.5–1.3)
EGFR: 63 ML/MIN/1.73M2 — SIGNIFICANT CHANGE UP
EOSINOPHIL # BLD AUTO: 0.01 K/UL — SIGNIFICANT CHANGE UP (ref 0–0.5)
EOSINOPHIL NFR BLD AUTO: 0.2 % — SIGNIFICANT CHANGE UP (ref 0–6)
GLUCOSE SERPL-MCNC: 87 MG/DL — SIGNIFICANT CHANGE UP (ref 70–99)
HCT VFR BLD CALC: 34.8 % — LOW (ref 39–50)
HGB BLD-MCNC: 11.8 G/DL — LOW (ref 13–17)
IMM GRANULOCYTES NFR BLD AUTO: 0.2 % — SIGNIFICANT CHANGE UP (ref 0–0.9)
LYMPHOCYTES # BLD AUTO: 1.25 K/UL — SIGNIFICANT CHANGE UP (ref 1–3.3)
LYMPHOCYTES # BLD AUTO: 27.7 % — SIGNIFICANT CHANGE UP (ref 13–44)
MAGNESIUM SERPL-MCNC: 1.7 MG/DL — SIGNIFICANT CHANGE UP (ref 1.6–2.6)
MCHC RBC-ENTMCNC: 32 PG — SIGNIFICANT CHANGE UP (ref 27–34)
MCHC RBC-ENTMCNC: 33.9 GM/DL — SIGNIFICANT CHANGE UP (ref 32–36)
MCV RBC AUTO: 94.3 FL — SIGNIFICANT CHANGE UP (ref 80–100)
MONOCYTES # BLD AUTO: 0.53 K/UL — SIGNIFICANT CHANGE UP (ref 0–0.9)
MONOCYTES NFR BLD AUTO: 11.8 % — SIGNIFICANT CHANGE UP (ref 2–14)
NEUTROPHILS # BLD AUTO: 2.7 K/UL — SIGNIFICANT CHANGE UP (ref 1.8–7.4)
NEUTROPHILS NFR BLD AUTO: 59.9 % — SIGNIFICANT CHANGE UP (ref 43–77)
NRBC # BLD: 0 /100 WBCS — SIGNIFICANT CHANGE UP (ref 0–0)
PHOSPHATE SERPL-MCNC: 3.1 MG/DL — SIGNIFICANT CHANGE UP (ref 2.5–4.5)
PLATELET # BLD AUTO: 118 K/UL — LOW (ref 150–400)
POTASSIUM SERPL-MCNC: 3.9 MMOL/L — SIGNIFICANT CHANGE UP (ref 3.5–5.3)
POTASSIUM SERPL-SCNC: 3.9 MMOL/L — SIGNIFICANT CHANGE UP (ref 3.5–5.3)
RAPID RVP RESULT: SIGNIFICANT CHANGE UP
RBC # BLD: 3.69 M/UL — LOW (ref 4.2–5.8)
RBC # FLD: 12.9 % — SIGNIFICANT CHANGE UP (ref 10.3–14.5)
SARS-COV-2 RNA SPEC QL NAA+PROBE: SIGNIFICANT CHANGE UP
SODIUM SERPL-SCNC: 135 MMOL/L — SIGNIFICANT CHANGE UP (ref 135–145)
WBC # BLD: 4.51 K/UL — SIGNIFICANT CHANGE UP (ref 3.8–10.5)
WBC # FLD AUTO: 4.51 K/UL — SIGNIFICANT CHANGE UP (ref 3.8–10.5)

## 2023-08-13 PROCEDURE — 93970 EXTREMITY STUDY: CPT | Mod: 26

## 2023-08-13 PROCEDURE — 99223 1ST HOSP IP/OBS HIGH 75: CPT

## 2023-08-13 PROCEDURE — 93971 EXTREMITY STUDY: CPT | Mod: 26,59,RT

## 2023-08-13 RX ORDER — POLYETHYLENE GLYCOL 3350 17 G/17G
17 POWDER, FOR SOLUTION ORAL DAILY
Refills: 0 | Status: DISCONTINUED | OUTPATIENT
Start: 2023-08-13 | End: 2023-08-18

## 2023-08-13 RX ORDER — ENOXAPARIN SODIUM 100 MG/ML
40 INJECTION SUBCUTANEOUS EVERY 24 HOURS
Refills: 0 | Status: DISCONTINUED | OUTPATIENT
Start: 2023-08-13 | End: 2023-08-18

## 2023-08-13 RX ORDER — SENNA PLUS 8.6 MG/1
2 TABLET ORAL AT BEDTIME
Refills: 0 | Status: DISCONTINUED | OUTPATIENT
Start: 2023-08-13 | End: 2023-08-18

## 2023-08-13 RX ORDER — FINASTERIDE 5 MG/1
5 TABLET, FILM COATED ORAL DAILY
Refills: 0 | Status: DISCONTINUED | OUTPATIENT
Start: 2023-08-13 | End: 2023-08-18

## 2023-08-13 RX ADMIN — GABAPENTIN 800 MILLIGRAM(S): 400 CAPSULE ORAL at 18:09

## 2023-08-13 RX ADMIN — Medication 1 TABLET(S): at 11:40

## 2023-08-13 RX ADMIN — SENNA PLUS 2 TABLET(S): 8.6 TABLET ORAL at 21:12

## 2023-08-13 RX ADMIN — BUPROPION HYDROCHLORIDE 300 MILLIGRAM(S): 150 TABLET, EXTENDED RELEASE ORAL at 11:40

## 2023-08-13 RX ADMIN — FINASTERIDE 5 MILLIGRAM(S): 5 TABLET, FILM COATED ORAL at 14:37

## 2023-08-13 RX ADMIN — Medication 1000 UNIT(S): at 11:40

## 2023-08-13 RX ADMIN — POLYETHYLENE GLYCOL 3350 17 GRAM(S): 17 POWDER, FOR SOLUTION ORAL at 11:40

## 2023-08-13 RX ADMIN — TAMSULOSIN HYDROCHLORIDE 0.4 MILLIGRAM(S): 0.4 CAPSULE ORAL at 21:11

## 2023-08-13 RX ADMIN — GABAPENTIN 800 MILLIGRAM(S): 400 CAPSULE ORAL at 04:29

## 2023-08-13 RX ADMIN — Medication 650 MILLIGRAM(S): at 04:28

## 2023-08-13 RX ADMIN — Medication 650 MILLIGRAM(S): at 22:18

## 2023-08-13 RX ADMIN — Medication 650 MILLIGRAM(S): at 21:12

## 2023-08-13 RX ADMIN — ENOXAPARIN SODIUM 40 MILLIGRAM(S): 100 INJECTION SUBCUTANEOUS at 14:37

## 2023-08-13 NOTE — PROGRESS NOTE ADULT - ASSESSMENT
INCOMPLETE    82y Male with PMHx of TIA (dx 2 years ago, did not feel any symptoms, reports his PCP does not think he had a TIA but his neurologist did so he takes asa/plavix daily), BPH, type 1 DM, diabetic neuropathy, HLD presents to ED for gait ataxia starting 1530 today after having lunch. Walks with a cane at baseline and his gait was worse. Fell at 430pm, no LOC. Reports has chronic bilateral LE weakness and chronic dizziness. CT imaging unremarkable. Case discussed with Dr. Agustin prior to urgent intervention. Risk and benefits of tenecteplase were discussed with patient member including risk of symptomatic ICH/death. Decision was made that benefits outweighed risks and tenecteplase was administered at 1830 on 8/10. 24h stability scan demonstrates ****************    Neuro  #CVA workup  - will start ASA 81mg and Plavix 75mg   - continue atorvastatin 80mg daily  - q4hr stroke neuro checks and vitals  - obtain MRI Brain without contrast  - Stroke Code HCT Results: negative  - Stroke Code CTA Results: Small amount of calcified plaque at the cavernous segments of the bilateral internal carotid arteries. The anterior and middle cerebral arteries are patent at their 1st and 2nd order segments, and appear symmetric caliber. The posterior circulation shows no high grade stenosis or occlusion. Mildly hypoplastic right P1 segment with a prominent posterior communicating artery.Trace amount of calcified plaque in the right V4 segment. 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  - Stroke education    Cards  #HTN  - permissive hypertension, Goal -180  - obtain TTE    #HLD  - high dose statin as above in CVA  - LDL results: 76    Pulm  - call provider if SPO2 < 94%    GI  #Nutrition/Fluids/Electrolytes   - replete K<4 and Mg <2  - Diet: CCD    Renal  - daily BMP      Endocrine  #DM type 1  - A1C results: 5%  - ac/hs fingersticks    - TSH results: 1.170    DVT Prophylaxis  - lovenox sq for DVT prophylaxis  - SCDs for DVT prophylaxis       Dispo: [facility] vs home PT     Discussed daily hospital plans and goals with patient.    Discussed with Neurology Attending Dr. Nguyen 82y Male with PMHx of TIA (dx 2 years ago, did not feel any symptoms, reports his PCP does not think he had a TIA but his neurologist did so he takes asa/plavix daily), BPH, type 1 DM, diabetic neuropathy, HLD presents to ED for gait ataxia starting 1530 today after having lunch. Walks with a cane at baseline and his gait was worse. Fell at 430pm, no LOC. Reports has chronic bilateral LE weakness and chronic dizziness. CT imaging unremarkable. Case discussed with Dr. Agustin prior to urgent intervention. Risk and benefits of tenecteplase were discussed with patient member including risk of symptomatic ICH/death. Decision was made that benefits outweighed risks and tenecteplase was administered at 1830 on 8/10. 24h stability scan demonstrates ****************    Neuro  #CVA workup  - Cont ASA 81mg and Plavix 75mg   - continue atorvastatin 80mg daily  - q4hr stroke neuro checks and vitals  - f/u MRI Brain without contrast  - Stroke Code HCT Results: negative  - Stroke Code CTA Results: Small amount of calcified plaque at the cavernous segments of the bilateral internal carotid arteries. The anterior and middle cerebral arteries are patent at their 1st and 2nd order segments, and appear symmetric caliber. The posterior circulation shows no high grade stenosis or occlusion. Mildly hypoplastic right P1 segment with a prominent posterior communicating artery. Trace amount of calcified plaque in the right V4 segment. 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  - Stroke education    Cards  #HTN  - permissive hypertension, Goal -180  - f/u TTE    #HLD  - high dose statin as above in CVA  - LDL results: 76    Pulm  - call provider if SPO2 < 94%    GI  #Nutrition/Fluids/Electrolytes   - replete K<4 and Mg <2  - Diet: CCD    Renal  - daily BMP    ID  #fevers  Pt has been having fevers for 2 days, complains of rhinorrhea and cough for several days. BCx pending, CXR and U/A negative; RVP negative. Possible source RUE hematoma, as area appears more swollen today with warmth  - F/U BCx  - F/U dopplers    Endocrine  #DM type 1  - A1C results: 5%  - ac/hs fingersticks    - TSH results: 1.170    DVT Prophylaxis  - lovenox sq for DVT prophylaxis  - SCDs for DVT prophylaxis       Dispo: [facility] vs home PT     Discussed daily hospital plans and goals with patient.    Discussed with Neurology Attending Dr. Nguyen 82y Male with PMHx of TIA (dx 2 years ago, did not feel any symptoms, reports his PCP does not think he had a TIA but his neurologist did so he takes asa/plavix daily), BPH, type 1 DM, diabetic neuropathy, HLD presents to ED for gait ataxia starting 1530 today after having lunch. Walks with a cane at baseline and his gait was worse. Fell at 430pm, no LOC. Reports has chronic bilateral LE weakness and chronic dizziness. CT imaging unremarkable. Case discussed with Dr. Agustin prior to urgent intervention. Risk and benefits of tenecteplase were discussed with patient member including risk of symptomatic ICH/death. Decision was made that benefits outweighed risks and tenecteplase was administered at 1830 on 8/10. 24h stability scan pending    Neuro  #CVA workup  - Holding ASA 81mg and Plavix 75mg iso thrombocytopenia  - continue atorvastatin 80mg daily  - q4hr stroke neuro checks and vitals  - f/u MRI Brain without contrast  - Stroke Code HCT Results: negative  - Stroke Code CTA Results: Small amount of calcified plaque at the cavernous segments of the bilateral internal carotid arteries. The anterior and middle cerebral arteries are patent at their 1st and 2nd order segments, and appear symmetric caliber. The posterior circulation shows no high grade stenosis or occlusion. Mildly hypoplastic right P1 segment with a prominent posterior communicating artery. Trace amount of calcified plaque in the right V4 segment. 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  - Stroke education    Cards  #HTN  - permissive hypertension, Goal -180  - f/u TTE    #HLD  - high dose statin as above in CVA  - LDL results: 76    Pulm  - call provider if SPO2 < 94%    GI  #Nutrition/Fluids/Electrolytes   - replete K<4 and Mg <2  - Diet: CCD    Renal  - daily BMP    ID  #fevers  Pt has been having fevers for 2 days, complains of rhinorrhea and cough for several days. BCx pending, CXR and U/A negative; RVP negative. Possible source RUE hematoma, as area appears more swollen today with warmth  - F/U BCx  - F/U dopplers    HEME  #Thrombocytopenia  Plt decreasing since admission, down to 118 8/13 AM, possibly 2/2 large forming hematoma in RUE  - Trend CBC, holding aspirin, plavix    Endocrine  #DM type 1  - A1C results: 5%  - ac/hs fingersticks    - TSH results: 1.170    DVT Prophylaxis  - SCDs for DVT prophylaxis       Dispo: [facility] vs home PT     Discussed daily hospital plans and goals with patient.    Discussed with Neurology Attending Dr. Nguyen 82y Male with PMHx of TIA (dx 2 years ago, did not feel any symptoms, reports his PCP does not think he had a TIA but his neurologist did so he takes asa/plavix daily), BPH, DM, diabetic neuropathy, HLD presents to ED for gait ataxia starting 1530 today after having lunch. Walks with a cane at baseline and his gait was worse. Fell at 430pm, no LOC. Reports has chronic bilateral LE weakness and chronic dizziness. CT imaging unremarkable. Case discussed with Dr. Agustin prior to urgent intervention. Risk and benefits of tenecteplase were discussed with patient member including risk of symptomatic ICH/death. Decision was made that benefits outweighed risks and tenecteplase was administered at 1830 on 8/10. 24h stability scan pending    Neuro  #CVA workup  - Holding ASA 81mg and Plavix 75mg iso thrombocytopenia  - continue atorvastatin 80mg daily  - q4hr stroke neuro checks and vitals  - f/u MRI Brain without contrast  - Stroke Code HCT Results: negative  - Stroke Code CTA Results: Small amount of calcified plaque at the cavernous segments of the bilateral internal carotid arteries. The anterior and middle cerebral arteries are patent at their 1st and 2nd order segments, and appear symmetric caliber. The posterior circulation shows no high grade stenosis or occlusion. Mildly hypoplastic right P1 segment with a prominent posterior communicating artery. Trace amount of calcified plaque in the right V4 segment. 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  - Stroke education    Cards  #HTN  - permissive hypertension, Goal -180  - f/u TTE    #HLD  - high dose statin as above in CVA  - LDL results: 76    Pulm  - call provider if SPO2 < 94%    GI  #Nutrition/Fluids/Electrolytes   - replete K<4 and Mg <2  - Diet: CCD    Renal  - daily BMP    ID  #fevers  Pt has been having fevers for 2 days, complains of rhinorrhea and cough for several days. BCx pending, CXR and U/A negative; RVP negative. Possible source RUE hematoma, as area appears more swollen today with warmth  - F/U BCx  - F/U dopplers    HEME  #Thrombocytopenia  Plt decreasing since admission, down to 118 8/13 AM, possibly 2/2 large forming hematoma in RUE  - Trend CBC, holding aspirin, plavix    Endocrine  #DM  - A1C results: 5%  - ac/hs fingersticks    - TSH results: 1.170    DVT Prophylaxis  - SCDs for DVT prophylaxis       Dispo: [facility] vs home PT     Discussed daily hospital plans and goals with patient.    Discussed with Neurology Attending Dr. Nguyen

## 2023-08-13 NOTE — CONSULT NOTE ADULT - ASSESSMENT
82y Male with PMHx of TIA, BPH, DM, diabetic neuropathy, HLD who presented to ED for gait ataxia (at baseline uses cane but gait was worse) resulting in fall. S/p tenecteplase with 24 hour scan stable. Stepped down to stroke tele. Course c/b fevers up to 101.6 rectally with infectious workup so far negative    #CVA workup    #fevers  -no leukocytosis, no tachycardia  -UA neg, CXR clear, RVP/covid neg  -pt reporting rhinorrhea?    #BPH    #DM  per chart patient has DM1 however outpatient medications not consistent as patient not on insulin  neuropathy --       Dispo: split plan, [facility] vs home with home PT   82y Male with PMHx of TIA, BPH, DM, diabetic neuropathy, HLD who presented to ED for gait ataxia (at baseline uses cane but gait was worse) resulting in fall. S/p tenecteplase with 24 hour scan stable. Stepped down to stroke tele. Course c/b fevers up to 101.6 rectally with infectious workup so far negative    #CVA workup  s/p TNK with stable followup scan  pending MRI, TTE  dapt held for decreasing platelets  A1C 5.0  TSH nl  LDL 76    #fevers  -no leukocytosis, no tachycardia  -UA neg, CXR clear, RVP/covid neg  -no obvious infectious symptoms - says rhinorrhea and cough are chronic  -IV sites without e/o infection  -related to hematoma? warmth noted -- pending u/s  -would monitor off antibiotics at this time  -f/u bcx    #BPH  c/w flomax, finasteride    #DM  #diabetic neuropathy  per chart patient has DM1 however outpatient medications not consistent as patient has never been on insulin -- takes metformin, mounjaro  given A1C 5.0 likely okay to be off FS   c/w gabapentin    #thrombocytopenia  152 on admission, now 118  no exposure to heparin products  differential includes related to possible infection, hematoma, TNK administration, ITP  DAPT held  monitor for now  consider ultrasound to assess for hypersplenism  if continues to worsen can consult heme    #normocytic anemia  may be 2/2 hematoma  monitor    DVT ppx: lovenox    Dispo: split plan, [facility] vs home with home PT

## 2023-08-13 NOTE — PROGRESS NOTE ADULT - SUBJECTIVE AND OBJECTIVE BOX
Neurology Stroke Progress Note    INTERVAL HPI/OVERNIGHT EVENTS:  Patient seen and examined.  number ______ used.    MEDICATIONS  (STANDING):  buPROPion XL (24-Hour) . 300 milliGRAM(s) Oral daily  calcium carbonate    500 mG (Tums) Chewable 1 Tablet(s) Chew daily  chlorhexidine 4% Liquid 1 Application(s) Topical <User Schedule>  cholecalciferol 1000 Unit(s) Oral daily  dextrose 5%. 1000 milliLiter(s) (50 mL/Hr) IV Continuous <Continuous>  dextrose 5%. 1000 milliLiter(s) (100 mL/Hr) IV Continuous <Continuous>  dextrose 50% Injectable 25 Gram(s) IV Push once  dextrose 50% Injectable 12.5 Gram(s) IV Push once  dextrose 50% Injectable 25 Gram(s) IV Push once  gabapentin 800 milliGRAM(s) Oral two times a day  glucagon  Injectable 1 milliGRAM(s) IntraMuscular once  polyethylene glycol 3350 17 Gram(s) Oral daily  senna 2 Tablet(s) Oral at bedtime  tamsulosin 0.4 milliGRAM(s) Oral at bedtime    MEDICATIONS  (PRN):  acetaminophen     Tablet .. 650 milliGRAM(s) Oral every 6 hours PRN Temp greater or equal to 38C (100.4F), Mild Pain (1 - 3)  dextrose Oral Gel 15 Gram(s) Oral once PRN Blood Glucose LESS THAN 70 milliGRAM(s)/deciliter      Allergies    No Known Allergies    Intolerances        Vital Signs Last 24 Hrs  T(C): 36.9 (13 Aug 2023 06:41), Max: 38.7 (13 Aug 2023 04:15)  T(F): 98.4 (13 Aug 2023 06:41), Max: 101.6 (13 Aug 2023 04:15)  HR: 63 (13 Aug 2023 04:15) (61 - 101)  BP: 144/67 (13 Aug 2023 04:15) (104/57 - 144/67)  BP(mean): 97 (13 Aug 2023 04:15) (78 - 97)  RR: 20 (13 Aug 2023 04:15) (17 - 24)  SpO2: 94% (13 Aug 2023 04:15) (93% - 98%)    Parameters below as of 13 Aug 2023 04:15  Patient On (Oxygen Delivery Method): room air        Physical exam:  General: No acute distress, awake and alert  Eyes: Anicteric sclerae, moist conjunctivae, see below for CNs  Neck: trachea midline, FROM, supple, no thyromegaly or lymphadenopathy  Cardiovascular: Regular rate and rhythm, no murmurs, rubs, or gallops. No carotid bruits.   Pulmonary: Anterior breath sounds clear bilaterally, no crackles or wheezing. No use of accessory muscles  GI: Abdomen soft, non-distended, non-tender  Extremities: Radial and DP pulses +2, no edema    Neurologic:  -Mental status: Awake, alert, oriented to person, place, and time. Speech is fluent with intact naming, repetition, and comprehension, no dysarthria. Recent and remote memory intact. Follows commands. Attention/concentration intact. Fund of knowledge appropriate.  -Cranial nerves:   II: Visual fields are full to confrontation.  III, IV, VI: Extraocular movements are intact without nystagmus. Pupils equally round and reactive to light  V:  Facial sensation V1-V3 equal and intact   VII: Face is symmetric with normal eye closure and smile  VIII: Hearing is bilaterally intact to finger rub  IX, X: Uvula is midline and soft palate rises symmetrically  XI: Head turning and shoulder shrug are intact.  XII: Tongue protrudes midline  Motor: Normal bulk and tone. No pronator drift. Strength bilateral upper extremity 5/5, bilateral lower extremities 5/5.  Rapid alternating movements intact and symmetric  Sensation: Intact to light touch bilaterally. No neglect or extinction on double simultaneous testing.  Coordination: No dysmetria on finger-to-nose and heel-to-shin bilaterally  Reflexes: Downgoing toes bilaterally   Gait: Narrow gait and steady    LABS:                        11.8   4.51  )-----------( 118      ( 13 Aug 2023 05:05 )             34.8     08-13    135  |  103  |  18  ----------------------------<  87  3.9   |  22  |  1.17    Ca    8.6      13 Aug 2023 05:05  Phos  3.1     08-13  Mg     1.7     08-13    TPro  7.0  /  Alb  4.2  /  TBili  0.7  /  DBili  x   /  AST  19  /  ALT  10  /  AlkPhos  86  08-11    PT/INR - ( 11 Aug 2023 18:05 )   PT: 12.5 sec;   INR: 1.10          PTT - ( 11 Aug 2023 18:05 )  PTT:35.1 sec  Urinalysis Basic - ( 13 Aug 2023 05:05 )    Color: x / Appearance: x / SG: x / pH: x  Gluc: 87 mg/dL / Ketone: x  / Bili: x / Urobili: x   Blood: x / Protein: x / Nitrite: x   Leuk Esterase: x / RBC: x / WBC x   Sq Epi: x / Non Sq Epi: x / Bacteria: x        RADIOLOGY & ADDITIONAL TESTS:     Neurology Stroke Progress Note    INTERVAL HPI/OVERNIGHT EVENTS:  Patient seen and examined. Pt was febrile to 101 overnight, BCx drawn. U/A was negative for UTI 8/12 following first fever of 100.4, CXR negative for any acute cardiopulmonary findings. Pr complains of rhinorrhea today and continued cough, otherwise denies current fever chills, N/V, diarrhea, dyruria, hematuria, changes in vision, dizziness, new paraesthesias.    ROS otherwise negative    MEDICATIONS  (STANDING):  buPROPion XL (24-Hour) . 300 milliGRAM(s) Oral daily  calcium carbonate    500 mG (Tums) Chewable 1 Tablet(s) Chew daily  chlorhexidine 4% Liquid 1 Application(s) Topical <User Schedule>  cholecalciferol 1000 Unit(s) Oral daily  dextrose 5%. 1000 milliLiter(s) (50 mL/Hr) IV Continuous <Continuous>  dextrose 5%. 1000 milliLiter(s) (100 mL/Hr) IV Continuous <Continuous>  dextrose 50% Injectable 25 Gram(s) IV Push once  dextrose 50% Injectable 12.5 Gram(s) IV Push once  dextrose 50% Injectable 25 Gram(s) IV Push once  gabapentin 800 milliGRAM(s) Oral two times a day  glucagon  Injectable 1 milliGRAM(s) IntraMuscular once  polyethylene glycol 3350 17 Gram(s) Oral daily  senna 2 Tablet(s) Oral at bedtime  tamsulosin 0.4 milliGRAM(s) Oral at bedtime    MEDICATIONS  (PRN):  acetaminophen     Tablet .. 650 milliGRAM(s) Oral every 6 hours PRN Temp greater or equal to 38C (100.4F), Mild Pain (1 - 3)  dextrose Oral Gel 15 Gram(s) Oral once PRN Blood Glucose LESS THAN 70 milliGRAM(s)/deciliter      Allergies    No Known Allergies    Intolerances        Vital Signs Last 24 Hrs  T(C): 36.9 (13 Aug 2023 06:41), Max: 38.7 (13 Aug 2023 04:15)  T(F): 98.4 (13 Aug 2023 06:41), Max: 101.6 (13 Aug 2023 04:15)  HR: 63 (13 Aug 2023 04:15) (61 - 101)  BP: 144/67 (13 Aug 2023 04:15) (104/57 - 144/67)  BP(mean): 97 (13 Aug 2023 04:15) (78 - 97)  RR: 20 (13 Aug 2023 04:15) (17 - 24)  SpO2: 94% (13 Aug 2023 04:15) (93% - 98%)    Parameters below as of 13 Aug 2023 04:15  Patient On (Oxygen Delivery Method): room air        Physical exam:  General: No acute distress, awake and alert  Eyes: Anicteric sclerae, moist conjunctivae, see below for CNs  Neck: trachea midline, FROM, supple, no thyromegaly or lymphadenopathy  Cardiovascular: Regular rate and rhythm, no murmurs, rubs, or gallops. No carotid bruits.   Pulmonary: Anterior breath sounds clear bilaterally, no crackles or wheezing. No use of accessory muscles  GI: Abdomen soft, non-distended, non-tender  Extremities: Radial and DP pulses +2, RUE edema with diffuse ecchymosis from fall    Neurologic:  -Mental status: Awake, alert, oriented to person, place, and time. Speech is fluent with intact naming, repetition, and comprehension, no dysarthria. Recent and remote memory intact. Follows commands. Attention/concentration intact. Fund of knowledge appropriate.  -Cranial nerves:   II: Visual fields are full to confrontation.  III, IV, VI: Extraocular movements are intact without nystagmus. Pupils equally round and reactive to light  V:  Facial sensation V1-V3 equal and intact   VII: Face is symmetric with normal eye closure and smile  VIII: Hearing is bilaterally intact to finger rub  IX, X: Uvula is midline and soft palate rises symmetrically  XI: Head turning and shoulder shrug are intact.  XII: Tongue protrudes midline  Motor: Normal bulk and tone. No pronator drift. Strength bilateral upper extremity 5/5, bilateral lower extremities 3/5.  Rapid alternating movements intact and symmetric  Sensation: Intact to light touch bilaterally. No neglect or extinction on double simultaneous testing.  Coordination: No dysmetria on finger-to-nose and heel-to-shin bilaterally    LABS:                        11.8   4.51  )-----------( 118      ( 13 Aug 2023 05:05 )             34.8     08-13    135  |  103  |  18  ----------------------------<  87  3.9   |  22  |  1.17    Ca    8.6      13 Aug 2023 05:05  Phos  3.1     08-13  Mg     1.7     08-13    TPro  7.0  /  Alb  4.2  /  TBili  0.7  /  DBili  x   /  AST  19  /  ALT  10  /  AlkPhos  86  08-11    PT/INR - ( 11 Aug 2023 18:05 )   PT: 12.5 sec;   INR: 1.10          PTT - ( 11 Aug 2023 18:05 )  PTT:35.1 sec  Urinalysis Basic - ( 13 Aug 2023 05:05 )    Color: x / Appearance: x / SG: x / pH: x  Gluc: 87 mg/dL / Ketone: x  / Bili: x / Urobili: x   Blood: x / Protein: x / Nitrite: x   Leuk Esterase: x / RBC: x / WBC x   Sq Epi: x / Non Sq Epi: x / Bacteria: x        RADIOLOGY & ADDITIONAL TESTS:     Neurology Stroke Progress Note    INTERVAL HPI/OVERNIGHT EVENTS:  Patient seen and examined. Pt was febrile to 101 overnight, BCx drawn. U/A was negative for UTI 8/12 following first fever of 100.4, CXR negative for any acute cardiopulmonary findings. Pr complains of rhinorrhea today and continued cough, otherwise denies current fever chills, N/V, diarrhea, dyruria, hematuria, changes in vision, dizziness, new paraesthesias.    ROS otherwise negative    MEDICATIONS  (STANDING):  buPROPion XL (24-Hour) . 300 milliGRAM(s) Oral daily  calcium carbonate    500 mG (Tums) Chewable 1 Tablet(s) Chew daily  chlorhexidine 4% Liquid 1 Application(s) Topical <User Schedule>  cholecalciferol 1000 Unit(s) Oral daily  dextrose 5%. 1000 milliLiter(s) (50 mL/Hr) IV Continuous <Continuous>  dextrose 5%. 1000 milliLiter(s) (100 mL/Hr) IV Continuous <Continuous>  dextrose 50% Injectable 25 Gram(s) IV Push once  dextrose 50% Injectable 12.5 Gram(s) IV Push once  dextrose 50% Injectable 25 Gram(s) IV Push once  gabapentin 800 milliGRAM(s) Oral two times a day  glucagon  Injectable 1 milliGRAM(s) IntraMuscular once  polyethylene glycol 3350 17 Gram(s) Oral daily  senna 2 Tablet(s) Oral at bedtime  tamsulosin 0.4 milliGRAM(s) Oral at bedtime    MEDICATIONS  (PRN):  acetaminophen     Tablet .. 650 milliGRAM(s) Oral every 6 hours PRN Temp greater or equal to 38C (100.4F), Mild Pain (1 - 3)  dextrose Oral Gel 15 Gram(s) Oral once PRN Blood Glucose LESS THAN 70 milliGRAM(s)/deciliter      Allergies    No Known Allergies    Intolerances        Vital Signs Last 24 Hrs  T(C): 36.9 (13 Aug 2023 06:41), Max: 38.7 (13 Aug 2023 04:15)  T(F): 98.4 (13 Aug 2023 06:41), Max: 101.6 (13 Aug 2023 04:15)  HR: 63 (13 Aug 2023 04:15) (61 - 101)  BP: 144/67 (13 Aug 2023 04:15) (104/57 - 144/67)  BP(mean): 97 (13 Aug 2023 04:15) (78 - 97)  RR: 20 (13 Aug 2023 04:15) (17 - 24)  SpO2: 94% (13 Aug 2023 04:15) (93% - 98%)    Parameters below as of 13 Aug 2023 04:15  Patient On (Oxygen Delivery Method): room air        Physical exam:  General: No acute distress, awake and alert  Eyes: Anicteric sclerae, moist conjunctivae, see below for CNs  Neck: trachea midline, FROM, supple, no thyromegaly or lymphadenopathy  Cardiovascular: Regular rate and rhythm, no murmurs, rubs, or gallops. No carotid bruits.   Pulmonary: Anterior breath sounds clear bilaterally, no crackles or wheezing. No use of accessory muscles  GI: Abdomen soft, non-distended, non-tender  Extremities: Radial and DP pulses +2, RUE edema with diffuse ecchymosis from fall, warm to touch    Neurologic:  -Mental status: Awake, alert, oriented to person, place, and time. Speech is fluent with intact naming, repetition, and comprehension, no dysarthria. Recent and remote memory intact. Follows commands. Attention/concentration intact. Fund of knowledge appropriate.  -Cranial nerves:   II: Visual fields are full to confrontation.  III, IV, VI: Extraocular movements are intact without nystagmus. Pupils equally round and reactive to light  V:  Facial sensation V1-V3 equal and intact   VII: Face is symmetric with normal eye closure and smile  VIII: Hearing is bilaterally intact to finger rub  IX, X: Uvula is midline and soft palate rises symmetrically  XI: Head turning and shoulder shrug are intact.  XII: Tongue protrudes midline  Motor: Normal bulk and tone. No pronator drift. Strength bilateral upper extremity 5/5, bilateral lower extremities 3/5.  Rapid alternating movements intact and symmetric  Sensation: Intact to light touch bilaterally. No neglect or extinction on double simultaneous testing.  Coordination: No dysmetria on finger-to-nose and heel-to-shin bilaterally    LABS:                        11.8   4.51  )-----------( 118      ( 13 Aug 2023 05:05 )             34.8     08-13    135  |  103  |  18  ----------------------------<  87  3.9   |  22  |  1.17    Ca    8.6      13 Aug 2023 05:05  Phos  3.1     08-13  Mg     1.7     08-13    TPro  7.0  /  Alb  4.2  /  TBili  0.7  /  DBili  x   /  AST  19  /  ALT  10  /  AlkPhos  86  08-11    PT/INR - ( 11 Aug 2023 18:05 )   PT: 12.5 sec;   INR: 1.10          PTT - ( 11 Aug 2023 18:05 )  PTT:35.1 sec  Urinalysis Basic - ( 13 Aug 2023 05:05 )    Color: x / Appearance: x / SG: x / pH: x  Gluc: 87 mg/dL / Ketone: x  / Bili: x / Urobili: x   Blood: x / Protein: x / Nitrite: x   Leuk Esterase: x / RBC: x / WBC x   Sq Epi: x / Non Sq Epi: x / Bacteria: x        RADIOLOGY & ADDITIONAL TESTS:

## 2023-08-13 NOTE — OCCUPATIONAL THERAPY INITIAL EVALUATION ADULT - PERTINENT HX OF CURRENT PROBLEM, REHAB EVAL
82y Male with PMHx of TIA (dx 2 years ago, did not feel any symptoms, reports his PCP does not think he had a TIA but his neurologist did so he takes asa/plavix daily), BPH, type 1 DM, diabetic neuropathy, HLD presents to ED for gait ataxia starting 1530 (8/11) after having lunch. Walks with a cane at baseline and his gait was worse. Fell at 430pm, no LOC. Reports has chronic bilateral LE weakness and chronic dizziness

## 2023-08-13 NOTE — OCCUPATIONAL THERAPY INITIAL EVALUATION ADULT - DIAGNOSIS, OT EVAL
Pt. admitted to [facility] for further stroke w/u and management s/p mechanical fall and increased dizziness. Upon assessment, pt. demo generalized deconditioning and impaired balance as well as +orthostatics impacting how the pt. engages in ADLs and functional mobility/transfers.

## 2023-08-13 NOTE — OCCUPATIONAL THERAPY INITIAL EVALUATION ADULT - GENERAL OBSERVATIONS, REHAB EVAL
OT IE complete. MRS 4. RN Jimmie and Stroke PA Beatriz pacheco pt. for OOB. Pt. received semi-supine in bed, +heplock, +tele in NAD agreeable to therapy session.

## 2023-08-13 NOTE — OCCUPATIONAL THERAPY INITIAL EVALUATION ADULT - ADDITIONAL COMMENTS
pt. resides alone in an elevator accessible apartment with one step to enter in the lobby. pt. reports I prior in ADLs and use of SC for functional mob/transfers. BR with standard toilet with grab bars and grab bars in the shower. Pt. reports history of frequent falls. He wears glasses and is R handed

## 2023-08-13 NOTE — OCCUPATIONAL THERAPY INITIAL EVALUATION ADULT - MODALITIES TREATMENT COMMENTS
Cranial Nerves II - XII: II: PERRLA; visual fields are full to confrontation III, IV, VI: EOMI, no nystagmus appreciated V: facial sensation intact to light touch V1-V3 b/l VII: no ptosis, no facial droop, symmetric eyebrow raise and closure VIII: hearing intact to finger rub b/l  XI: head turning and shoulder shrug intact b/l XII: tongue protrusion midline, Vision H test- smooth b/l pursuits, Visual Quadrant test- WNL

## 2023-08-13 NOTE — CONSULT NOTE ADULT - SUBJECTIVE AND OBJECTIVE BOX
ANNE CRUZ  82y  Male      Patient is a 82y old  Male who presents with a chief complaint of Dizziness s/p TNK (13 Aug 2023 07:30)        PAST MEDICAL/SURGICAL HISTORY  PAST MEDICAL & SURGICAL HISTORY:  HTN (hypertension)      HLD (hyperlipidemia)      DM (diabetes mellitus)          REVIEW OF SYSTEMS:  CONSTITUTIONAL: No fever, weight loss, or fatigue  EYES: No eye pain, visual disturbances, or discharge  ENMT:  No difficulty hearing, tinnitus, vertigo; No sinus or throat pain  NECK: No pain or stiffness  BREASTS: No pain, masses, or nipple discharge  RESPIRATORY: No cough, wheezing, chills or hemoptysis; No shortness of breath  CARDIOVASCULAR: No chest pain, palpitations, dizziness, or leg swelling  GASTROINTESTINAL: No abdominal or epigastric pain. No nausea, vomiting, or hematemesis; No diarrhea or constipation. No melena or hematochezia.  GENITOURINARY: No dysuria, frequency, hematuria, or incontinence  NEUROLOGICAL: No headaches, memory loss, loss of strength, numbness, or tremors  SKIN: No itching, burning, rashes, or lesions   LYMPH NODES: No enlarged glands  ENDOCRINE: No heat or cold intolerance; No hair loss  MUSCULOSKELETAL: No joint pain or swelling; No muscle, back, or extremity pain  PSYCHIATRIC: No depression, anxiety, mood swings, or difficulty sleeping  HEME/LYMPH: No easy bruising, or bleeding gums  ALLERY AND IMMUNOLOGIC: No hives or eczema    T(C): 36.9 (08-13-23 @ 06:41), Max: 38.7 (08-13-23 @ 04:15)  HR: 59 (08-13-23 @ 08:33) (59 - 101)  BP: 117/58 (08-13-23 @ 08:33) (105/59 - 144/67)  RR: 19 (08-13-23 @ 08:33) (17 - 24)  SpO2: 91% (08-13-23 @ 08:33) (91% - 98%)  Wt(kg): --Vital Signs Last 24 Hrs  T(C): 36.9 (13 Aug 2023 06:41), Max: 38.7 (13 Aug 2023 04:15)  T(F): 98.4 (13 Aug 2023 06:41), Max: 101.6 (13 Aug 2023 04:15)  HR: 59 (13 Aug 2023 08:33) (59 - 101)  BP: 117/58 (13 Aug 2023 08:33) (105/59 - 144/67)  BP(mean): 81 (13 Aug 2023 08:33) (80 - 97)  RR: 19 (13 Aug 2023 08:33) (17 - 24)  SpO2: 91% (13 Aug 2023 08:33) (91% - 98%)    Parameters below as of 13 Aug 2023 08:33  Patient On (Oxygen Delivery Method): room air        PHYSICAL EXAM:  GENERAL: NAD, well-groomed, well-developed  HEAD:  Atraumatic, Normocephalic  EYES: EOMI, PERRLA, conjunctiva and sclera clear  ENMT: No tonsillar erythema, exudates, or enlargement; Moist mucous membranes, Good dentition, No lesions  NECK: Supple, No JVD, Normal thyroid  NERVOUS SYSTEM:  Alert & Oriented X3, Good concentration; Motor Strength 5/5 B/L upper and lower extremities; DTRs 2+ intact and symmetric  CHEST/LUNG: Clear to percussion bilaterally; No rales, rhonchi, wheezing, or rubs  HEART: Regular rate and rhythm; No murmurs, rubs, or gallops  ABDOMEN: Soft, Nontender, Nondistended; Bowel sounds present  EXTREMITIES:  2+ Peripheral Pulses, No clubbing, cyanosis, or edema  LYMPH: No lymphadenopathy noted  SKIN: No rashes or lesions    Consultant(s) Notes Reviewed:  [x ] YES  [ ] NO  Care Discussed with Consultants/Other Providers [ x] YES  [ ] NO    LABS:  CBC   08-13-23 @ 05:05  Hematcorit 34.8  Hemoglobin 11.8  Mean Cell Hemoglobin 32.0  Platelet Count-Automated 118  RBC Count 3.69  Red Cell Distrib Width 12.9  Wbc Count 4.51      BMP  08-13-23 @ 05:05  Anion Gap. Serum 10  Blood Urea Nitrogen,Serm 18  Calcium, Total Serum 8.6  Carbon Dioxide, Serum 22  Chloride, Serum 103  Creatinine, Serum 1.17  eGFR in  --  eGFR in Non Afican American --  Gloucose, serum 87  Potassium, Serum 3.9  Sodium, Serum 135              08-12-23 @ 05:30  Anion Gap. Serum 10  Blood Urea Nitrogen,Serm 16  Calcium, Total Serum 8.8  Carbon Dioxide, Serum 23  Chloride, Serum 101  Creatinine, Serum 1.31  eGFR in  --  eGFR in Non Afican American --  Gloucose, serum 81  Potassium, Serum 4.0  Sodium, Serum 134              08-11-23 @ 18:05  Anion Gap. Serum 16  Blood Urea Nitrogen,Serm 20  Calcium, Total Serum 9.5  Carbon Dioxide, Serum 19  Chloride, Serum 104  Creatinine, Serum 1.40  eGFR in  --  eGFR in Non Afican American --  Gloucose, serum 95  Potassium, Serum 4.3  Sodium, Serum 139                  CMP  08-13-23 @ 05:05  Raina Aminotransferase(ALT/SGPT)--  Albumin, Serum --  Alkaline Phosphatase, Serum --  Anion Gap, Serum 10  Aspartate Aminotransferase (AST/SGOT)--  Bilirubin Total, Serum --  Blood Urea Nitrogen, Serum 18  Calcium,Total Serum 8.6  Carbon Dioxide, Serum 22  Chloride, Serum 103  Creatinine, Serum 1.17  eGFR if  --  eGFR if Non African American --  Glucose, Serum 87  Potassium, Serum 3.9  Protein Total, Serum --  Sodium, Serum 135                      08-12-23 @ 05:30  Raina Aminotransferase(ALT/SGPT)--  Albumin, Serum --  Alkaline Phosphatase, Serum --  Anion Gap, Serum 10  Aspartate Aminotransferase (AST/SGOT)--  Bilirubin Total, Serum --  Blood Urea Nitrogen, Serum 16  Calcium,Total Serum 8.8  Carbon Dioxide, Serum 23  Chloride, Serum 101  Creatinine, Serum 1.31  eGFR if  --  eGFR if Non African American --  Glucose, Serum 81  Potassium, Serum 4.0  Protein Total, Serum --  Sodium, Serum 134                      08-11-23 @ 18:05  Raina Aminotransferase(ALT/SGPT)10  Albumin, Serum 4.2  Alkaline Phosphatase, Serum 86  Anion Gap, Serum 16  Aspartate Aminotransferase (AST/SGOT)19  Bilirubin Total, Serum 0.7  Blood Urea Nitrogen, Serum 20  Calcium,Total Serum 9.5  Carbon Dioxide, Serum 19  Chloride, Serum 104  Creatinine, Serum 1.40  eGFR if  --  eGFR if Non African American --  Glucose, Serum 95  Potassium, Serum 4.3  Protein Total, Serum 7.0  Sodium, Serum 139                          PT/INR  PT/INR  08-11-23 @ 18:05  INR 1.10  Prothrombin Time Comment --  Prothrobin Time, [id number]      Amylase/Lipase            RADIOLOGY & ADDITIONAL TESTS:    Imaging Personally Reviewed:  [ ] YES  [ ] NO ANNE CRUZ  82y  Male      Patient is a 82y old  Male who presents with a chief complaint of Dizziness s/p TNK (13 Aug 2023 07:30)    HPI per stroke team: 82y Male with PMHx of TIA (dx 2 years ago, did not feel any symptoms, reports his PCP does not think he had a TIA but his neurologist did so he takes asa/plavix daily), BPH, DM, diabetic neuropathy, HLD presented to ED for gait ataxia (at baseline uses cane but gait was worse) resulting in fall. S/p tenecteplase with 24 hour scan stable. Course c/b fevers up to 101.6 rectally with infectious workup so far negative.           PAST MEDICAL/SURGICAL HISTORY  PAST MEDICAL & SURGICAL HISTORY:  HTN (hypertension)      HLD (hyperlipidemia)      DM (diabetes mellitus)        T(C): 36.9 (08-13-23 @ 06:41), Max: 38.7 (08-13-23 @ 04:15)  HR: 59 (08-13-23 @ 08:33) (59 - 101)  BP: 117/58 (08-13-23 @ 08:33) (105/59 - 144/67)  RR: 19 (08-13-23 @ 08:33) (17 - 24)  SpO2: 91% (08-13-23 @ 08:33) (91% - 98%)  Wt(kg): --Vital Signs Last 24 Hrs  T(C): 36.9 (13 Aug 2023 06:41), Max: 38.7 (13 Aug 2023 04:15)  T(F): 98.4 (13 Aug 2023 06:41), Max: 101.6 (13 Aug 2023 04:15)  HR: 59 (13 Aug 2023 08:33) (59 - 101)  BP: 117/58 (13 Aug 2023 08:33) (105/59 - 144/67)  BP(mean): 81 (13 Aug 2023 08:33) (80 - 97)  RR: 19 (13 Aug 2023 08:33) (17 - 24)  SpO2: 91% (13 Aug 2023 08:33) (91% - 98%)    Parameters below as of 13 Aug 2023 08:33  Patient On (Oxygen Delivery Method): room air        PHYSICAL EXAM:  GENERAL: NAD, well-groomed, well-developed  HEAD:  Atraumatic, Normocephalic  EYES: EOMI, PERRLA, conjunctiva and sclera clear  ENMT: No tonsillar erythema, exudates, or enlargement; Moist mucous membranes, Good dentition, No lesions  NECK: Supple, No JVD, Normal thyroid  NERVOUS SYSTEM:  Alert & Oriented X3, Good concentration; Motor Strength 5/5 B/L upper and lower extremities; DTRs 2+ intact and symmetric  CHEST/LUNG: Clear to percussion bilaterally; No rales, rhonchi, wheezing, or rubs  HEART: Regular rate and rhythm; No murmurs, rubs, or gallops  ABDOMEN: Soft, Nontender, Nondistended; Bowel sounds present  EXTREMITIES:  2+ Peripheral Pulses, No clubbing, cyanosis, or edema  LYMPH: No lymphadenopathy noted  SKIN: No rashes or lesions    Consultant(s) Notes Reviewed:  [x ] YES  [ ] NO  Care Discussed with Consultants/Other Providers [ x] YES  [ ] NO    LABS:  CBC   08-13-23 @ 05:05  Hematcorit 34.8  Hemoglobin 11.8  Mean Cell Hemoglobin 32.0  Platelet Count-Automated 118  RBC Count 3.69  Red Cell Distrib Width 12.9  Wbc Count 4.51      BMP  08-13-23 @ 05:05  Anion Gap. Serum 10  Blood Urea Nitrogen,Serm 18  Calcium, Total Serum 8.6  Carbon Dioxide, Serum 22  Chloride, Serum 103  Creatinine, Serum 1.17  eGFR in  --  eGFR in Non Afican American --  Gloucose, serum 87  Potassium, Serum 3.9  Sodium, Serum 135              08-12-23 @ 05:30  Anion Gap. Serum 10  Blood Urea Nitrogen,Serm 16  Calcium, Total Serum 8.8  Carbon Dioxide, Serum 23  Chloride, Serum 101  Creatinine, Serum 1.31  eGFR in  --  eGFR in Non Afican American --  Gloucose, serum 81  Potassium, Serum 4.0  Sodium, Serum 134              08-11-23 @ 18:05  Anion Gap. Serum 16  Blood Urea Nitrogen,Serm 20  Calcium, Total Serum 9.5  Carbon Dioxide, Serum 19  Chloride, Serum 104  Creatinine, Serum 1.40  eGFR in  --  eGFR in Non Afican American --  Gloucose, serum 95  Potassium, Serum 4.3  Sodium, Serum 139                  CMP  08-13-23 @ 05:05  Raina Aminotransferase(ALT/SGPT)--  Albumin, Serum --  Alkaline Phosphatase, Serum --  Anion Gap, Serum 10  Aspartate Aminotransferase (AST/SGOT)--  Bilirubin Total, Serum --  Blood Urea Nitrogen, Serum 18  Calcium,Total Serum 8.6  Carbon Dioxide, Serum 22  Chloride, Serum 103  Creatinine, Serum 1.17  eGFR if  --  eGFR if Non African American --  Glucose, Serum 87  Potassium, Serum 3.9  Protein Total, Serum --  Sodium, Serum 135                      08-12-23 @ 05:30  Raina Aminotransferase(ALT/SGPT)--  Albumin, Serum --  Alkaline Phosphatase, Serum --  Anion Gap, Serum 10  Aspartate Aminotransferase (AST/SGOT)--  Bilirubin Total, Serum --  Blood Urea Nitrogen, Serum 16  Calcium,Total Serum 8.8  Carbon Dioxide, Serum 23  Chloride, Serum 101  Creatinine, Serum 1.31  eGFR if  --  eGFR if Non African American --  Glucose, Serum 81  Potassium, Serum 4.0  Protein Total, Serum --  Sodium, Serum 134                      08-11-23 @ 18:05  Raina Aminotransferase(ALT/SGPT)10  Albumin, Serum 4.2  Alkaline Phosphatase, Serum 86  Anion Gap, Serum 16  Aspartate Aminotransferase (AST/SGOT)19  Bilirubin Total, Serum 0.7  Blood Urea Nitrogen, Serum 20  Calcium,Total Serum 9.5  Carbon Dioxide, Serum 19  Chloride, Serum 104  Creatinine, Serum 1.40  eGFR if  --  eGFR if Non African American --  Glucose, Serum 95  Potassium, Serum 4.3  Protein Total, Serum 7.0  Sodium, Serum 139                          PT/INR  PT/INR  08-11-23 @ 18:05  INR 1.10  Prothrombin Time Comment --  Prothrobin Time, [id number]      Amylase/Lipase            RADIOLOGY & ADDITIONAL TESTS:    Imaging Personally Reviewed:  [ ] YES  [ ] NO ANNE CRUZ  82y  Male      Patient is a 82y old  Male who presents with a chief complaint of Dizziness s/p TNK (13 Aug 2023 07:30)    HPI per stroke team: 82y Male with PMHx of TIA (dx 2 years ago, did not feel any symptoms, reports his PCP does not think he had a TIA but his neurologist did so he takes asa/plavix daily), BPH, DM, diabetic neuropathy, HLD presented to ED for gait ataxia (at baseline uses cane but gait was worse) resulting in fall. S/p tenecteplase with 24 hour scan stable. Course c/b fevers up to 101.6 rectally with infectious workup so far negative.     Further interview: Patient reports fevers were not present prior to admission. No sick contacts. No new symptoms such as sore throat, cough, rash, N/V, diarrhea.  No recent unintentional weight loss or night sweats. Does feel his appetite is poor.   When asked why patient uses a cane at baseline patient says because he feels unsteady on his feet. When asked why patient became unsteady on his feet, patient unable to give a reason. When asked if it is related to diabetic neuropathy patient says not but also says he does have poor sensation in his feet.  Regarding chart noting diagnosis of DM1 -- patient says he has the kind that 'comes and goes'. Diagnosed in his 30s. Has never been on insulin.     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, no drug use; retired from hospital consulting work  Home meds: metformin, mounjaro, flomax, silenor, lutein, ativan, gabapentin, finasteride, vit D3, B12, plavix, calcium carbonate, aspirin, buproprion, lipitor        PAST MEDICAL/SURGICAL HISTORY  PAST MEDICAL & SURGICAL HISTORY:  HTN (hypertension)      HLD (hyperlipidemia)      DM (diabetes mellitus)        T(C): 36.9 (08-13-23 @ 06:41), Max: 38.7 (08-13-23 @ 04:15)  HR: 59 (08-13-23 @ 08:33) (59 - 101)  BP: 117/58 (08-13-23 @ 08:33) (105/59 - 144/67)  RR: 19 (08-13-23 @ 08:33) (17 - 24)  SpO2: 91% (08-13-23 @ 08:33) (91% - 98%)  Wt(kg): --Vital Signs Last 24 Hrs  T(C): 36.9 (13 Aug 2023 06:41), Max: 38.7 (13 Aug 2023 04:15)  T(F): 98.4 (13 Aug 2023 06:41), Max: 101.6 (13 Aug 2023 04:15)  HR: 59 (13 Aug 2023 08:33) (59 - 101)  BP: 117/58 (13 Aug 2023 08:33) (105/59 - 144/67)  BP(mean): 81 (13 Aug 2023 08:33) (80 - 97)  RR: 19 (13 Aug 2023 08:33) (17 - 24)  SpO2: 91% (13 Aug 2023 08:33) (91% - 98%)    Parameters below as of 13 Aug 2023 08:33  Patient On (Oxygen Delivery Method): room air        PHYSICAL EXAM:  GENERAL: elderly  male, nad, non-toxic, non-diaphoretic  HEAD:  Atraumatic, Normocephalic  EYES: EOMI, PERRLA, conjunctiva and sclera clear  ENMT: No tonsillar erythema, exudates, or enlargement; Moist mucous membranes, Good dentition, No lesions  NECK: Supple, No JVD  NERVOUS SYSTEM:  Alert & Oriented X3, gait not tested  CHEST/LUNG: CTAB, no wheezes, rales, rhonchi  HEART: Regular rate and rhythm; No murmurs, rubs, or gallops  ABDOMEN: Soft, Nontender, Nondistended; Bowel sounds present  EXTREMITIES:  large hematoma over right elbow  LYMPH: No lymphadenopathy noted  SKIN: No rashes or lesions    Consultant(s) Notes Reviewed:  [x ] YES  [ ] NO  Care Discussed with Consultants/Other Providers [ x] YES  [ ] NO    LABS:  CBC   08-13-23 @ 05:05  Hematcorit 34.8  Hemoglobin 11.8  Mean Cell Hemoglobin 32.0  Platelet Count-Automated 118  RBC Count 3.69  Red Cell Distrib Width 12.9  Wbc Count 4.51      BMP  08-13-23 @ 05:05  Anion Gap. Serum 10  Blood Urea Nitrogen,Serm 18  Calcium, Total Serum 8.6  Carbon Dioxide, Serum 22  Chloride, Serum 103  Creatinine, Serum 1.17  eGFR in  --  eGFR in Non Afican American --  Gloucose, serum 87  Potassium, Serum 3.9  Sodium, Serum 135              08-12-23 @ 05:30  Anion Gap. Serum 10  Blood Urea Nitrogen,Serm 16  Calcium, Total Serum 8.8  Carbon Dioxide, Serum 23  Chloride, Serum 101  Creatinine, Serum 1.31  eGFR in  --  eGFR in Non Afican American --  Gloucose, serum 81  Potassium, Serum 4.0  Sodium, Serum 134              08-11-23 @ 18:05  Anion Gap. Serum 16  Blood Urea Nitrogen,Serm 20  Calcium, Total Serum 9.5  Carbon Dioxide, Serum 19  Chloride, Serum 104  Creatinine, Serum 1.40  eGFR in  --  eGFR in Non Afican American --  Gloucose, serum 95  Potassium, Serum 4.3  Sodium, Serum 139                  CMP  08-13-23 @ 05:05  Raina Aminotransferase(ALT/SGPT)--  Albumin, Serum --  Alkaline Phosphatase, Serum --  Anion Gap, Serum 10  Aspartate Aminotransferase (AST/SGOT)--  Bilirubin Total, Serum --  Blood Urea Nitrogen, Serum 18  Calcium,Total Serum 8.6  Carbon Dioxide, Serum 22  Chloride, Serum 103  Creatinine, Serum 1.17  eGFR if  --  eGFR if Non African American --  Glucose, Serum 87  Potassium, Serum 3.9  Protein Total, Serum --  Sodium, Serum 135                      08-12-23 @ 05:30  Raina Aminotransferase(ALT/SGPT)--  Albumin, Serum --  Alkaline Phosphatase, Serum --  Anion Gap, Serum 10  Aspartate Aminotransferase (AST/SGOT)--  Bilirubin Total, Serum --  Blood Urea Nitrogen, Serum 16  Calcium,Total Serum 8.8  Carbon Dioxide, Serum 23  Chloride, Serum 101  Creatinine, Serum 1.31  eGFR if  --  eGFR if Non African American --  Glucose, Serum 81  Potassium, Serum 4.0  Protein Total, Serum --  Sodium, Serum 134                      08-11-23 @ 18:05  Raina Aminotransferase(ALT/SGPT)10  Albumin, Serum 4.2  Alkaline Phosphatase, Serum 86  Anion Gap, Serum 16  Aspartate Aminotransferase (AST/SGOT)19  Bilirubin Total, Serum 0.7  Blood Urea Nitrogen, Serum 20  Calcium,Total Serum 9.5  Carbon Dioxide, Serum 19  Chloride, Serum 104  Creatinine, Serum 1.40  eGFR if  --  eGFR if Non African American --  Glucose, Serum 95  Potassium, Serum 4.3  Protein Total, Serum 7.0  Sodium, Serum 139                          PT/INR  PT/INR  08-11-23 @ 18:05  INR 1.10  Prothrombin Time Comment --  Prothrobin Time, [id number]      Amylase/Lipase            RADIOLOGY & ADDITIONAL TESTS:    Imaging Personally Reviewed:  [ ] YES  [ ] NO

## 2023-08-14 LAB
ANION GAP SERPL CALC-SCNC: 11 MMOL/L — SIGNIFICANT CHANGE UP (ref 5–17)
APPEARANCE UR: CLEAR — SIGNIFICANT CHANGE UP
BACTERIA # UR AUTO: PRESENT /HPF
BASOPHILS # BLD AUTO: 0.02 K/UL — SIGNIFICANT CHANGE UP (ref 0–0.2)
BASOPHILS NFR BLD AUTO: 0.5 % — SIGNIFICANT CHANGE UP (ref 0–2)
BILIRUB UR-MCNC: NEGATIVE — SIGNIFICANT CHANGE UP
BUN SERPL-MCNC: 19 MG/DL — SIGNIFICANT CHANGE UP (ref 7–23)
CALCIUM SERPL-MCNC: 8.5 MG/DL — SIGNIFICANT CHANGE UP (ref 8.4–10.5)
CHLORIDE SERPL-SCNC: 103 MMOL/L — SIGNIFICANT CHANGE UP (ref 96–108)
CO2 SERPL-SCNC: 23 MMOL/L — SIGNIFICANT CHANGE UP (ref 22–31)
COLOR SPEC: YELLOW — SIGNIFICANT CHANGE UP
COMMENT - URINE: SIGNIFICANT CHANGE UP
CREAT SERPL-MCNC: 1.11 MG/DL — SIGNIFICANT CHANGE UP (ref 0.5–1.3)
DIFF PNL FLD: ABNORMAL
EGFR: 67 ML/MIN/1.73M2 — SIGNIFICANT CHANGE UP
EOSINOPHIL # BLD AUTO: 0.06 K/UL — SIGNIFICANT CHANGE UP (ref 0–0.5)
EOSINOPHIL NFR BLD AUTO: 1.4 % — SIGNIFICANT CHANGE UP (ref 0–6)
EPI CELLS # UR: SIGNIFICANT CHANGE UP /HPF (ref 0–5)
GLUCOSE SERPL-MCNC: 74 MG/DL — SIGNIFICANT CHANGE UP (ref 70–99)
GLUCOSE UR QL: NEGATIVE — SIGNIFICANT CHANGE UP
HCT VFR BLD CALC: 34.7 % — LOW (ref 39–50)
HGB BLD-MCNC: 12.1 G/DL — LOW (ref 13–17)
HYALINE CASTS # UR AUTO: ABNORMAL /LPF (ref 0–2)
IMM GRANULOCYTES NFR BLD AUTO: 0.2 % — SIGNIFICANT CHANGE UP (ref 0–0.9)
KETONES UR-MCNC: NEGATIVE — SIGNIFICANT CHANGE UP
LEUKOCYTE ESTERASE UR-ACNC: NEGATIVE — SIGNIFICANT CHANGE UP
LYMPHOCYTES # BLD AUTO: 1.63 K/UL — SIGNIFICANT CHANGE UP (ref 1–3.3)
LYMPHOCYTES # BLD AUTO: 39.1 % — SIGNIFICANT CHANGE UP (ref 13–44)
MAGNESIUM SERPL-MCNC: 1.9 MG/DL — SIGNIFICANT CHANGE UP (ref 1.6–2.6)
MCHC RBC-ENTMCNC: 34.8 PG — HIGH (ref 27–34)
MCHC RBC-ENTMCNC: 34.9 GM/DL — SIGNIFICANT CHANGE UP (ref 32–36)
MCV RBC AUTO: 99.7 FL — SIGNIFICANT CHANGE UP (ref 80–100)
MONOCYTES # BLD AUTO: 0.52 K/UL — SIGNIFICANT CHANGE UP (ref 0–0.9)
MONOCYTES NFR BLD AUTO: 12.5 % — SIGNIFICANT CHANGE UP (ref 2–14)
NEUTROPHILS # BLD AUTO: 1.93 K/UL — SIGNIFICANT CHANGE UP (ref 1.8–7.4)
NEUTROPHILS NFR BLD AUTO: 46.3 % — SIGNIFICANT CHANGE UP (ref 43–77)
NITRITE UR-MCNC: NEGATIVE — SIGNIFICANT CHANGE UP
NRBC # BLD: 0 /100 WBCS — SIGNIFICANT CHANGE UP (ref 0–0)
PH UR: 5.5 — SIGNIFICANT CHANGE UP (ref 5–8)
PHOSPHATE SERPL-MCNC: 3 MG/DL — SIGNIFICANT CHANGE UP (ref 2.5–4.5)
PLATELET # BLD AUTO: 122 K/UL — LOW (ref 150–400)
POTASSIUM SERPL-MCNC: 4.1 MMOL/L — SIGNIFICANT CHANGE UP (ref 3.5–5.3)
POTASSIUM SERPL-SCNC: 4.1 MMOL/L — SIGNIFICANT CHANGE UP (ref 3.5–5.3)
PROT UR-MCNC: NEGATIVE MG/DL — SIGNIFICANT CHANGE UP
RBC # BLD: 3.48 M/UL — LOW (ref 4.2–5.8)
RBC # FLD: 13.1 % — SIGNIFICANT CHANGE UP (ref 10.3–14.5)
RBC CASTS # UR COMP ASSIST: ABNORMAL /HPF
SODIUM SERPL-SCNC: 137 MMOL/L — SIGNIFICANT CHANGE UP (ref 135–145)
SP GR SPEC: 1.02 — SIGNIFICANT CHANGE UP (ref 1–1.03)
UROBILINOGEN FLD QL: 0.2 E.U./DL — SIGNIFICANT CHANGE UP
WBC # BLD: 4.17 K/UL — SIGNIFICANT CHANGE UP (ref 3.8–10.5)
WBC # FLD AUTO: 4.17 K/UL — SIGNIFICANT CHANGE UP (ref 3.8–10.5)
WBC UR QL: < 5 /HPF — SIGNIFICANT CHANGE UP

## 2023-08-14 PROCEDURE — 93306 TTE W/DOPPLER COMPLETE: CPT | Mod: 26

## 2023-08-14 PROCEDURE — 99233 SBSQ HOSP IP/OBS HIGH 50: CPT

## 2023-08-14 RX ORDER — MIDODRINE HYDROCHLORIDE 2.5 MG/1
5 TABLET ORAL THREE TIMES A DAY
Refills: 0 | Status: DISCONTINUED | OUTPATIENT
Start: 2023-08-14 | End: 2023-08-15

## 2023-08-14 RX ORDER — CLOPIDOGREL BISULFATE 75 MG/1
75 TABLET, FILM COATED ORAL DAILY
Refills: 0 | Status: DISCONTINUED | OUTPATIENT
Start: 2023-08-14 | End: 2023-08-18

## 2023-08-14 RX ORDER — MAGNESIUM OXIDE 400 MG ORAL TABLET 241.3 MG
400 TABLET ORAL ONCE
Refills: 0 | Status: COMPLETED | OUTPATIENT
Start: 2023-08-14 | End: 2023-08-14

## 2023-08-14 RX ORDER — ASPIRIN/CALCIUM CARB/MAGNESIUM 324 MG
81 TABLET ORAL DAILY
Refills: 0 | Status: DISCONTINUED | OUTPATIENT
Start: 2023-08-14 | End: 2023-08-17

## 2023-08-14 RX ADMIN — GABAPENTIN 800 MILLIGRAM(S): 400 CAPSULE ORAL at 05:59

## 2023-08-14 RX ADMIN — MIDODRINE HYDROCHLORIDE 5 MILLIGRAM(S): 2.5 TABLET ORAL at 14:15

## 2023-08-14 RX ADMIN — TAMSULOSIN HYDROCHLORIDE 0.4 MILLIGRAM(S): 0.4 CAPSULE ORAL at 21:26

## 2023-08-14 RX ADMIN — MAGNESIUM OXIDE 400 MG ORAL TABLET 400 MILLIGRAM(S): 241.3 TABLET ORAL at 08:33

## 2023-08-14 RX ADMIN — Medication 1 TABLET(S): at 12:09

## 2023-08-14 RX ADMIN — CLOPIDOGREL BISULFATE 75 MILLIGRAM(S): 75 TABLET, FILM COATED ORAL at 12:44

## 2023-08-14 RX ADMIN — GABAPENTIN 800 MILLIGRAM(S): 400 CAPSULE ORAL at 18:28

## 2023-08-14 RX ADMIN — SENNA PLUS 2 TABLET(S): 8.6 TABLET ORAL at 21:26

## 2023-08-14 RX ADMIN — Medication 1000 UNIT(S): at 12:09

## 2023-08-14 RX ADMIN — BUPROPION HYDROCHLORIDE 300 MILLIGRAM(S): 150 TABLET, EXTENDED RELEASE ORAL at 12:09

## 2023-08-14 RX ADMIN — Medication 81 MILLIGRAM(S): at 12:45

## 2023-08-14 RX ADMIN — FINASTERIDE 5 MILLIGRAM(S): 5 TABLET, FILM COATED ORAL at 12:09

## 2023-08-14 RX ADMIN — ENOXAPARIN SODIUM 40 MILLIGRAM(S): 100 INJECTION SUBCUTANEOUS at 12:09

## 2023-08-14 RX ADMIN — MIDODRINE HYDROCHLORIDE 5 MILLIGRAM(S): 2.5 TABLET ORAL at 18:28

## 2023-08-14 NOTE — PROGRESS NOTE ADULT - ASSESSMENT
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  S/p tenecteplase and monitored in the ICU, then stable for transfer to tele stroke and pending further workup.     #CVA workup  - received TNK, follow up scan is stable  - pending MRI and TTE  - resuming DAPY  - rest of plan per stroke team     #fevers  -no leukocytosis, no tachycardia  -UA neg, CXR clear, RVP/covid neg  -no obvious infectious symptoms - says rhinorrhea and cough are chronic  - negative workup. No further fevrs.     #BPH  - continue flomax and finasteride    #DM  #diabetic neuropathy  - on mounjaro and metformin  - A1C is 5  - resume outpatient regimen on discahrge  - continue gabapentin at home dose for neuropathy     #thrombocytopenia  152 on admission, improved to 122  - no previous exposure to heparin  - resuming DAPT today  - if worsens, can consider a splenic ultrasound, however will hold on this additional test for now.   - recommend repeat labs in one week with primary care doctor     #normocytic anemia  - stable    50 minutes spent on this encounter, including face to face with patient, care coordination and documentation. Plan of care discussed with stroke team.

## 2023-08-14 NOTE — PROGRESS NOTE ADULT - SUBJECTIVE AND OBJECTIVE BOX
Neurology Stroke Progress Note    INTERVAL HPI/OVERNIGHT EVENTS:  Patient seen and examined    MEDICATIONS  (STANDING):  buPROPion XL (24-Hour) . 300 milliGRAM(s) Oral daily  calcium carbonate    500 mG (Tums) Chewable 1 Tablet(s) Chew daily  chlorhexidine 4% Liquid 1 Application(s) Topical <User Schedule>  cholecalciferol 1000 Unit(s) Oral daily  dextrose 5%. 1000 milliLiter(s) (50 mL/Hr) IV Continuous <Continuous>  dextrose 5%. 1000 milliLiter(s) (100 mL/Hr) IV Continuous <Continuous>  dextrose 50% Injectable 12.5 Gram(s) IV Push once  dextrose 50% Injectable 25 Gram(s) IV Push once  dextrose 50% Injectable 25 Gram(s) IV Push once  gabapentin 800 milliGRAM(s) Oral two times a day  glucagon  Injectable 1 milliGRAM(s) IntraMuscular once  tamsulosin 0.4 milliGRAM(s) Oral at bedtime    MEDICATIONS  (PRN):  acetaminophen     Tablet .. 650 milliGRAM(s) Oral every 6 hours PRN Temp greater or equal to 38C (100.4F), Mild Pain (1 - 3)  dextrose Oral Gel 15 Gram(s) Oral once PRN Blood Glucose LESS THAN 70 milliGRAM(s)/deciliter      Allergies    No Known Allergies    Intolerances        Vital Signs Last 24 Hrs  T(C): 37.3 (12 Aug 2023 10:00), Max: 37.9 (11 Aug 2023 22:14)  T(F): 99.1 (12 Aug 2023 10:00), Max: 100.3 (11 Aug 2023 22:14)  HR: 74 (12 Aug 2023 12:00) (62 - 96)  BP: 112/62 (12 Aug 2023 12:00) (104/57 - 155/74)  BP(mean): 82 (12 Aug 2023 12:00) (76 - 108)  RR: 18 (12 Aug 2023 12:00) (16 - 31)  SpO2: 97% (12 Aug 2023 12:00) (92% - 97%)    Parameters below as of 12 Aug 2023 12:00  Patient On (Oxygen Delivery Method): room air        Physical exam:  General: No acute distress, awake and alert  Eyes: Anicteric sclerae, moist conjunctivae, see below for CNs  Neck: trachea midline, FROM, supple, no thyromegaly or lymphadenopathy  Cardiovascular: Regular rate and rhythm  Pulmonary: . No use of accessory muscles  GI: Abdomen soft, non-distended, non-tender  Extremities: Radial and DP pulses +2, no edema    Neurologic:  -Mental status: Awake, alert, oriented to person, place, and time. Speech is fluent with intact naming, repetition, and comprehension, no dysarthria. Recent and remote memory intact. Follows commands. Attention/concentration intact.   -Cranial nerves:   II: Visual fields are full to confrontation.  III, IV, VI: Extraocular movements are intact without nystagmus. Pupils equally round and reactive to light  V:  Facial sensation V1-V3 equal and intact   VII: Face is symmetric with normal eye closure and smile  VIII: Hearing is bilaterally intact to finger rub  IX, X: Uvula is midline and soft palate rises symmetrically  XI: Head turning and shoulder shrug are intact.  XII: Tongue protrudes midline  Motor: Normal bulk and tone. No pronator drift. Strength bilateral upper extremity 5/5, bilateral lower extremities 3/5   Sensation: chronic bilateral ankle numbness. No neglect or extinction on double simultaneous testing.  Coordination: No dysmetria on finger-to-nose bilaterally  Gait: unsteady    LABS:                        12.0   4.39  )-----------( 135      ( 12 Aug 2023 05:30 )             35.8     08-12    134<L>  |  101  |  16  ----------------------------<  81  4.0   |  23  |  1.31<H>    Ca    8.8      12 Aug 2023 05:30  Phos  3.0     08-12  Mg     1.8     08-12    TPro  7.0  /  Alb  4.2  /  TBili  0.7  /  DBili  x   /  AST  19  /  ALT  10  /  AlkPhos  86  08-11    PT/INR - ( 11 Aug 2023 18:05 )   PT: 12.5 sec;   INR: 1.10          PTT - ( 11 Aug 2023 18:05 )  PTT:35.1 sec  Urinalysis Basic - ( 12 Aug 2023 05:30 )    Color: x / Appearance: x / SG: x / pH: x  Gluc: 81 mg/dL / Ketone: x  / Bili: x / Urobili: x   Blood: x / Protein: x / Nitrite: x   Leuk Esterase: x / RBC: x / WBC x   Sq Epi: x / Non Sq Epi: x / Bacteria: x        RADIOLOGY & ADDITIONAL TESTS:       Neurology Stroke Progress Note    INTERVAL HPI/OVERNIGHT EVENTS:  Patient seen and examined @ bedside. Denies any complaints at this point of time. Pt noted to be sitting in the bedside recliner. Pt noted to be orthostatics + while working with PT and was symptomatic. PT lives alone. States his dizziness and orthostatics Hypotension precedes admission and has been having it for almost a year and F/U with Neurologist Dr. Giovanny Rogers. Not on any anti hypertensives. Noted to be on Doxepin, Tamsulosin and Finasteride. On Mounjaro for his DM X 19weeks with well controlled BG (A1C 5.0, POCT ranges @ 80's-100's). Not on ISS while inpt. Has RUE hematoma which per pt feels better from DOA and has reduced in size, Dsg + with bleeding control. Downtrending Plts 135-->118-->122. Will discuss abt resuming DAPT. Stable H/H. Ordered compression stockings. Afebrile and Inf w/u negative thus far.    MEDICATIONS  (STANDING):  buPROPion XL (24-Hour) . 300 milliGRAM(s) Oral daily  calcium carbonate    500 mG (Tums) Chewable 1 Tablet(s) Chew daily  cholecalciferol 1000 Unit(s) Oral daily  dextrose 5%. 1000 milliLiter(s) (100 mL/Hr) IV Continuous <Continuous>  dextrose 5%. 1000 milliLiter(s) (50 mL/Hr) IV Continuous <Continuous>  dextrose 50% Injectable 12.5 Gram(s) IV Push once  dextrose 50% Injectable 25 Gram(s) IV Push once  dextrose 50% Injectable 25 Gram(s) IV Push once  enoxaparin Injectable 40 milliGRAM(s) SubCutaneous every 24 hours  finasteride 5 milliGRAM(s) Oral daily  gabapentin 800 milliGRAM(s) Oral two times a day  glucagon  Injectable 1 milliGRAM(s) IntraMuscular once  polyethylene glycol 3350 17 Gram(s) Oral daily  senna 2 Tablet(s) Oral at bedtime  tamsulosin 0.4 milliGRAM(s) Oral at bedtime    MEDICATIONS  (PRN):  acetaminophen     Tablet .. 650 milliGRAM(s) Oral every 6 hours PRN Temp greater or equal to 38C (100.4F), Mild Pain (1 - 3)  dextrose Oral Gel 15 Gram(s) Oral once PRN Blood Glucose LESS THAN 70 milliGRAM(s)/deciliter        Allergies  No Known Allergies      Vital Signs Last 24 Hrs  T(C): 37.2 (08-14-23 @ 09:10), Max: 37.2 (08-14-23 @ 09:10)  T(F): 98.9 (08-14-23 @ 09:10), Max: 98.9 (08-14-23 @ 09:10)  HR: 57 (08-14-23 @ 08:24) (56 - 62)  BP: 107/56 (08-14-23 @ 08:24) (107/56 - 144/75)  BP(mean): 77 (08-14-23 @ 08:24) (75 - 104)  RR: 18 (08-14-23 @ 08:24) (13 - 20)  SpO2: 95% (08-14-23 @ 08:24) (92% - 96%)    Orthostatic VS  08-13-23 @ 14:29  Lying BP: 130/64 HR: --  Sitting BP: 104/58 HR: --  Standing BP: 90/51 HR: --  Site: upper right arm  Mode: electronic  Orthostatic VS  08-12-23 @ 16:01  Lying BP: 147/63 HR: 68  Sitting BP: --/-- HR: --  Standing BP: 125/60 HR: 101  Site: --  Mode: --    Parameters below as of 12 Aug 2023 12:00  Patient On (Oxygen Delivery Method): room air        Physical exam:  General: No acute distress, awake and alert  Eyes: Anicteric sclerae, moist conjunctivae, see below for CNs  Neck: trachea midline, FROM, supple, no thyromegaly or lymphadenopathy  Cardiovascular: Regular rate and rhythm  Pulmonary: . No use of accessory muscles  GI: Abdomen soft, non-distended, non-tender  Extremities: Radial and DP pulses +2, RUE hematoma from fall PTA +, w/ Dsg C/D/I. No active bleeding @ this point of time. B/L LE swelling and TTP+ with discolouration B/L shin L>R+.    Neurologic:  -Mental status: Awake, alert, oriented to person, place, and time. Speech is fluent with intact naming, repetition, and comprehension, no dysarthria. Recent and remote memory intact. Follows commands. Attention/concentration intact.     -Cranial nerves:   II: Visual fields are full to confrontation.  III, IV, VI: Extraocular movements are intact without nystagmus. Pupils equally round and reactive to light.  V:  Facial sensation V1-V3 equal and intact   VII: Face is symmetric with normal eye closure and smile  VIII: Hearing is bilaterally intact to finger rub  IX, X: Uvula is midline and soft palate rises symmetrically  XI: Head turning and shoulder shrug are intact.  XII: Tongue protrudes midline  Motor: Normal bulk and tone. No pronator drift. Strength bilateral upper extremity 5/5, bilateral lower extremities 3/5 , C/O difficulty and pain while attempting to lift B/L LE and TTP + when palpating B/L shins  Sensation: chronic bilateral ankle numbness. No neglect or extinction on double simultaneous testing.  Coordination: No dysmetria on finger-to-nose bilaterally  Gait: unsteady    LABS:                                               12.1   4.17  )-----------( 122      ( 14 Aug 2023 06:37 )             34.7     08-14    137  |  103  |  19  ----------------------------<  74  4.1   |  23  |  1.11    Ca    8.5      14 Aug 2023 06:37  Phos  3.0     08-14  Mg     1.9     08-14          RADIOLOGY & ADDITIONAL TESTS:    CT Brain Stroke Protocol (08.11.23 @ 18:07)   IMPRESSION:    No acute intracranial hemorrhage or acute territorial infarct.    CT Angio Brain/Neck Stroke Protocol  w/ IV Cont (08.11.23 @ 18:13)   IMPRESSION:    No interval change from 5/20/23. No intracranial or extracranial arterial   steno-occlusive disease.    CT Brain Perfusion Maps Stroke (08.11.23 @ 18:10)     IMPRESSION:  Negative CT perfusion of the brain                     Neurology Stroke Progress Note    INTERVAL HPI/OVERNIGHT EVENTS:  Patient seen and examined @ bedside. Denies any complaints at this point of time. Pt noted to be sitting in the bedside recliner. Pt noted to be orthostatics + while working with PT and was symptomatic. PT lives alone. States his dizziness and orthostatics Hypotension precedes admission and has been having it for almost a year and F/U with Neurologist Dr. Giovanny Rogers. Not on any anti hypertensives. Noted to be on Doxepin, Tamsulosin and Finasteride. On Mounjaro for his DM X 19weeks with well controlled BG (A1C 5.0, POCT ranges @ 80's-100's). Not on ISS while inpt. Has RUE hematoma which per pt feels better from DOA and has reduced in size, Dsg + with bleeding control. Downtrending Plts 135-->118-->122. Will discuss abt resuming DAPT. Stable H/H. Ordered compression stockings. Afebrile and Inf w/u negative thus far.    MEDICATIONS  (STANDING):  aspirin enteric coated 81 milliGRAM(s) Oral daily  buPROPion XL (24-Hour) . 300 milliGRAM(s) Oral daily  calcium carbonate    500 mG (Tums) Chewable 1 Tablet(s) Chew daily  cholecalciferol 1000 Unit(s) Oral daily  clopidogrel Tablet 75 milliGRAM(s) Oral daily  dextrose 5%. 1000 milliLiter(s) (50 mL/Hr) IV Continuous <Continuous>  dextrose 5%. 1000 milliLiter(s) (100 mL/Hr) IV Continuous <Continuous>  dextrose 50% Injectable 12.5 Gram(s) IV Push once  dextrose 50% Injectable 25 Gram(s) IV Push once  dextrose 50% Injectable 25 Gram(s) IV Push once  enoxaparin Injectable 40 milliGRAM(s) SubCutaneous every 24 hours  finasteride 5 milliGRAM(s) Oral daily  gabapentin 800 milliGRAM(s) Oral two times a day  glucagon  Injectable 1 milliGRAM(s) IntraMuscular once  midodrine. 5 milliGRAM(s) Oral three times a day  polyethylene glycol 3350 17 Gram(s) Oral daily  senna 2 Tablet(s) Oral at bedtime  tamsulosin 0.4 milliGRAM(s) Oral at bedtime    MEDICATIONS  (PRN):  acetaminophen     Tablet .. 650 milliGRAM(s) Oral every 6 hours PRN Temp greater or equal to 38C (100.4F), Mild Pain (1 - 3)  dextrose Oral Gel 15 Gram(s) Oral once PRN Blood Glucose LESS THAN 70 milliGRAM(s)/deciliter      Allergies  No Known Allergies      Vital Signs Last 24 Hrs  T(C): 37.2 (08-14-23 @ 09:10), Max: 37.2 (08-14-23 @ 09:10)  T(F): 98.9 (08-14-23 @ 09:10), Max: 98.9 (08-14-23 @ 09:10)  HR: 57 (08-14-23 @ 08:24) (56 - 62)  BP: 107/56 (08-14-23 @ 08:24) (107/56 - 144/75)  BP(mean): 77 (08-14-23 @ 08:24) (75 - 104)  RR: 18 (08-14-23 @ 08:24) (13 - 20)  SpO2: 95% (08-14-23 @ 08:24) (92% - 96%)    Orthostatic VS  08-13-23 @ 14:29  Lying BP: 130/64 HR: --  Sitting BP: 104/58 HR: --  Standing BP: 90/51 HR: --  Site: upper right arm  Mode: electronic  Orthostatic VS  08-12-23 @ 16:01  Lying BP: 147/63 HR: 68  Sitting BP: --/-- HR: --  Standing BP: 125/60 HR: 101  Site: --  Mode: --    Parameters below as of 12 Aug 2023 12:00  Patient On (Oxygen Delivery Method): room air        Physical exam:  General: No acute distress, awake and alert  Eyes: Anicteric sclerae, moist conjunctivae, see below for CNs  Neck: trachea midline, FROM, supple, no thyromegaly or lymphadenopathy  Cardiovascular: Regular rate and rhythm  Pulmonary: . No use of accessory muscles  GI: Abdomen soft, non-distended, non-tender  Extremities: Radial and DP pulses +2, RUE hematoma from fall PTA +, w/ Dsg C/D/I. No active bleeding @ this point of time. B/L LE swelling and TTP+ with discolouration B/L shin L>R+.    Neurologic:  -Mental status: Awake, alert, oriented to person, place, and time. Speech is fluent with intact naming, repetition, and comprehension, no dysarthria. Recent and remote memory intact. Follows commands. Attention/concentration intact.     -Cranial nerves:   II: Visual fields are full to confrontation.  III, IV, VI: Extraocular movements are intact without nystagmus. Pupils equally round and reactive to light.  V:  Facial sensation V1-V3 equal and intact   VII: Face is symmetric with normal eye closure and smile  VIII: Hearing is bilaterally intact to finger rub  IX, X: Uvula is midline and soft palate rises symmetrically  XI: Head turning and shoulder shrug are intact.  XII: Tongue protrudes midline  Motor: Normal bulk and tone. No pronator drift. Strength bilateral upper extremity 5/5, bilateral lower extremities 3/5 , C/O difficulty and pain while attempting to lift B/L LE and TTP + when palpating B/L shins  Sensation: chronic bilateral ankle numbness. No neglect or extinction on double simultaneous testing.  Coordination: No dysmetria on finger-to-nose bilaterally  Gait: unsteady    LABS:                                               12.1   4.17  )-----------( 122      ( 14 Aug 2023 06:37 )             34.7     08-14    137  |  103  |  19  ----------------------------<  74  4.1   |  23  |  1.11    Ca    8.5      14 Aug 2023 06:37  Phos  3.0     08-14  Mg     1.9     08-14          RADIOLOGY & ADDITIONAL TESTS:    CT Brain Stroke Protocol (08.11.23 @ 18:07)   IMPRESSION:    No acute intracranial hemorrhage or acute territorial infarct.    CT Angio Brain/Neck Stroke Protocol  w/ IV Cont (08.11.23 @ 18:13)   IMPRESSION:    No interval change from 5/20/23. No intracranial or extracranial arterial   steno-occlusive disease.    CT Brain Perfusion Maps Stroke (08.11.23 @ 18:10)     IMPRESSION:  Negative CT perfusion of the brain                     Neurology Stroke Progress Note    INTERVAL HPI/OVERNIGHT EVENTS:  Patient seen and examined @ bedside. Denies any complaints at this point of time. Pt noted to be sitting in the bedside recliner. Pt noted to be orthostatics + while working with PT and was symptomatic. PT lives alone. States his dizziness and orthostatics Hypotension precedes admission and has been having it for almost a year and F/U with Neurologist Dr. Giovanny Rogers. Not on any anti hypertensives. Noted to be on Doxepin, Tamsulosin and Finasteride. On Mounjaro for his DM X 19weeks with well controlled BG (A1C 5.0, POCT ranges @ 80's-100's). Not on ISS while inpt. Has RUE hematoma which per pt feels better from DOA and has reduced in size, Dsg + with bleeding control. Downtrending Plts 135-->118-->122.Stable Platelets, resumed DAPT today. Stable H/H. Ordered compression stockings. Afebrile and Inf w/u negative thus far.    Orthostatics VS + on 8/14, started on Midodrine 5mg TID.    MEDICATIONS  (STANDING):  aspirin enteric coated 81 milliGRAM(s) Oral daily  buPROPion XL (24-Hour) . 300 milliGRAM(s) Oral daily  calcium carbonate    500 mG (Tums) Chewable 1 Tablet(s) Chew daily  cholecalciferol 1000 Unit(s) Oral daily  clopidogrel Tablet 75 milliGRAM(s) Oral daily  dextrose 5%. 1000 milliLiter(s) (50 mL/Hr) IV Continuous <Continuous>  dextrose 5%. 1000 milliLiter(s) (100 mL/Hr) IV Continuous <Continuous>  dextrose 50% Injectable 12.5 Gram(s) IV Push once  dextrose 50% Injectable 25 Gram(s) IV Push once  dextrose 50% Injectable 25 Gram(s) IV Push once  enoxaparin Injectable 40 milliGRAM(s) SubCutaneous every 24 hours  finasteride 5 milliGRAM(s) Oral daily  gabapentin 800 milliGRAM(s) Oral two times a day  glucagon  Injectable 1 milliGRAM(s) IntraMuscular once  midodrine. 5 milliGRAM(s) Oral three times a day  polyethylene glycol 3350 17 Gram(s) Oral daily  senna 2 Tablet(s) Oral at bedtime  tamsulosin 0.4 milliGRAM(s) Oral at bedtime    MEDICATIONS  (PRN):  acetaminophen     Tablet .. 650 milliGRAM(s) Oral every 6 hours PRN Temp greater or equal to 38C (100.4F), Mild Pain (1 - 3)  dextrose Oral Gel 15 Gram(s) Oral once PRN Blood Glucose LESS THAN 70 milliGRAM(s)/deciliter      Allergies  No Known Allergies      Vital Signs Last 24 Hrs  T(C): 37.2 (08-14-23 @ 09:10), Max: 37.2 (08-14-23 @ 09:10)  T(F): 98.9 (08-14-23 @ 09:10), Max: 98.9 (08-14-23 @ 09:10)  HR: 57 (08-14-23 @ 08:24) (56 - 62)  BP: 107/56 (08-14-23 @ 08:24) (107/56 - 144/75)  BP(mean): 77 (08-14-23 @ 08:24) (75 - 104)  RR: 18 (08-14-23 @ 08:24) (13 - 20)  SpO2: 95% (08-14-23 @ 08:24) (92% - 96%)    Orthostatic VS  08-13-23 @ 14:29  Lying BP: 130/64 HR: --  Sitting BP: 104/58 HR: --  Standing BP: 90/51 HR: --  Site: upper right arm  Mode: electronic  Orthostatic VS  08-12-23 @ 16:01  Lying BP: 147/63 HR: 68  Sitting BP: --/-- HR: --  Standing BP: 125/60 HR: 101  Site: --  Mode: --    Parameters below as of 12 Aug 2023 12:00  Patient On (Oxygen Delivery Method): room air        Physical exam:  General: No acute distress, awake and alert  Eyes: Anicteric sclerae, moist conjunctivae, see below for CNs  Neck: trachea midline, FROM, supple, no thyromegaly or lymphadenopathy  Cardiovascular: Regular rate and rhythm  Pulmonary: . No use of accessory muscles  GI: Abdomen soft, non-distended, non-tender  Extremities: Radial and DP pulses +2, RUE hematoma from fall PTA +, w/ Dsg C/D/I. No active bleeding @ this point of time. B/L LE swelling and TTP+ with discolouration B/L shin L>R+. Chronic B/L Shin pain.    Neurologic:  -Mental status: Awake, alert, oriented to person, place, and time. Speech is fluent with intact naming, repetition, and comprehension, no dysarthria. Recent and remote memory intact. Follows commands. Attention/concentration intact.     -Cranial nerves:   II: Visual fields are full to confrontation.  III, IV, VI: Extraocular movements are intact without nystagmus. Pupils equally round and reactive to light.  V:  Facial sensation V1-V3 equal and intact   VII: Face is symmetric with normal eye closure and smile  VIII: Hearing is bilaterally intact to finger rub  IX, X: Uvula is midline and soft palate rises symmetrically  XI: Head turning and shoulder shrug are intact.  XII: Tongue protrudes midline  Motor: Normal bulk and tone. No pronator drift. Strength bilateral upper extremity 5/5, bilateral lower extremities 3/5 , C/O difficulty and pain while attempting to lift B/L LE and TTP + when palpating B/L shins.  Sensation: chronic bilateral ankle numbness. No neglect or extinction on double simultaneous testing.  Coordination: No dysmetria on finger-to-nose bilaterally  Gait: unsteady    LABS:                                               12.1   4.17  )-----------( 122      ( 14 Aug 2023 06:37 )             34.7     08-14    137  |  103  |  19  ----------------------------<  74  4.1   |  23  |  1.11    Ca    8.5      14 Aug 2023 06:37  Phos  3.0     08-14  Mg     1.9     08-14          RADIOLOGY & ADDITIONAL TESTS:    CT Brain Stroke Protocol (08.11.23 @ 18:07)   IMPRESSION:    No acute intracranial hemorrhage or acute territorial infarct.    CT Angio Brain/Neck Stroke Protocol  w/ IV Cont (08.11.23 @ 18:13)   IMPRESSION:    No interval change from 5/20/23. No intracranial or extracranial arterial   steno-occlusive disease.    CT Brain Perfusion Maps Stroke (08.11.23 @ 18:10)     IMPRESSION:  Negative CT perfusion of the brain

## 2023-08-14 NOTE — PROGRESS NOTE ADULT - ASSESSMENT
Neurology    82 y o Male with PMHx of TIA (dx 2 years ago, did not feel any symptoms, reports his PCP does not think he had a TIA but his neurologist did so he takes asa/plavix daily), BPH, DM, diabetic neuropathy, HLD presents to ED for gait ataxia starting 1530 today after having lunch. Walks with a cane at baseline and his gait was worse. Fell at 430pm, no LOC. Reports has chronic bilateral LE weakness and chronic dizziness. CT imaging unremarkable. Case discussed with Dr. Agustin prior to urgent intervention. Risk and benefits of tenecteplase were discussed with patient member including risk of symptomatic ICH/death. Decision was made that benefits outweighed risks and tenecteplase was administered at 1830 on 8/11, stability CTH on 8/12 w/ no bleed. Now pending MRI and echo.     Neuro  #CVA workup  - Holding ASA 81mg and Plavix 75mg iso thrombocytopenia  - continue atorvastatin 80mg daily  - q4hr stroke neuro checks and vitals  - f/u MRI Brain without contrast  - Stroke Code HCT Results: negative  - Stroke Code CTA Results: Small amount of calcified plaque at the cavernous segments of the bilateral internal carotid arteries. The anterior and middle cerebral arteries are patent at their 1st and 2nd order segments, and appear symmetric caliber. The posterior circulation shows no high grade stenosis or occlusion. Mildly hypoplastic right P1 segment with a prominent posterior communicating artery. Trace amount of calcified plaque in the right V4 segment. 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  - Stroke education    Cards  #HTN  - Gradual Normotension.  - f/u TTE    #HLD  - high dose statin as above in CVA  - LDL results: 76    Pulm  - call provider if SPO2 < 94%    GI  #Nutrition/Fluids/Electrolytes   - replete K<4 and Mg <2  - Diet: CCD    Renal  - daily BMP    ID  #fevers  Pt has been having fevers for 2 days, complains of rhinorrhea and cough for several days. BCx pending, CXR and U/A negative; RVP negative. Possible source RUE hematoma, as area appears more swollen with warmth on 8/13, appears stable today.  - BCx: NGTD  - B/L LE and RUE  dopplers : Negative.    HEME  #Thrombocytopenia  Plt decreasing since admission, down to 118 8/13 AM, possibly 2/2 large forming hematoma in RUE, now stable with no active bleeding and Plts on 8/14 @122  - Trend CBC, holding aspirin, plavix    Endocrine  #DM  - A1C results: 5%  - ac/hs fingersticks  - Home regimen Metformin BID and Mounjaro inJ once a week X 19 weeks, held while inpt.    - TSH results: 1.170    DVT Prophylaxis  - SQL for DVT PPx.  - SCDs for DVT prophylaxis       Dispo: [facility] vs home PT, pending PM&R ,  Re eval by PT.

## 2023-08-14 NOTE — PROGRESS NOTE ADULT - ASSESSMENT
Patient: Shimon Healy Date: 2023   : 1969    53 year old male      OUTPATIENT WOUND CARE PROGRESS NOTE    Supervising Wound Care / Hyperbar[facility] Medicine Physician: Not Applicable  Consulting Provider: Liban Martinez MD   Date of Consultation/Last Comprehensive Exam:  2022  Referring  Provider:  Baldomero López MD    SUBJECTIVE:    Chief Complaint:  Recurrent bilateral lower extremity swelling with blistering and drainage           Wound/Ulcer Present:    Other, Recurrent bilateral lower extremity swelling with blistering and drainage         Additional Wound Category:  None     Maximum Baseline Ambulatory Status:  Unable to assess    History of Present Illness:  This is a 53 year old male with a past medical history significant for multiple sclerosis 1st seen in wound care on 03/10/2020 by Dr. Martinez.  He was seen for bilateral lower extremity edema in bilateral lower extremity ulcerations.  The ulcerations are thought to be secondary to picking and scratching.  He has had similar episodes of this prior to that visit responding well to Unna boot treated at a outside facility.  Patient has been treated intermittently since then most recently being followed by Yrn Ford.  Has been seen through lymphedema clinic utilizing compression pumps.     Interval History:  Compliant with 2/week VCC  Fatigued from MS    Current Treatment Regimen:  Dressing:  VCC    Frequency:  Twice a week   Changed by:  Wound care clinic nurse    Review of Systems:  Pertinent items are noted in HPI (history of present illness).    Past Medical History:   Diagnosis Date   • Anxiety    • Chronic pain    • Essential (primary) hypertension    • Facial numbness    • Fibromyalgia    • Lymphedema of both lower extremities 2021   • Memory disorder    • Multiple sclerosis (CMS/HCC)    • Nerve pain    • Numbness and tingling in hands    • Otitis media    • Rheumatoid arthritis    • RLS (restless legs syndrome)      Past  Surgical History:   Procedure Laterality Date   • Tonsillectomy and adenoidectomy       Social History     Socioeconomic History   • Marital status:      Spouse name: Not on file   • Number of children: Not on file   • Years of education: Not on file   • Highest education level: Not on file   Occupational History   • Not on file   Tobacco Use   • Smoking status: Former     Current packs/day: 0.25     Average packs/day: 0.3 packs/day for 15.0 years (3.7 ttl pk-yrs)     Types: Cigarettes     Start date: 4/28/2018   • Smokeless tobacco: Never   • Tobacco comments:     only smokes maybe 1-2 cigarettes/day   Substance and Sexual Activity   • Alcohol use: No     Alcohol/week: 0.0 standard drinks of alcohol   • Drug use: No   • Sexual activity: Not on file   Other Topics Concern   • Not on file   Social History Narrative   • Not on file     Social Determinants of Health     Financial Resource Strain: Not on file   Food Insecurity: Not on file   Transportation Needs: Not on file   Physical Activity: Not on file   Stress: Not on file   Social Connections: Not on file   Intimate Partner Violence: Not At Risk (3/29/2021)    Intimate Partner Violence    • Social Determinants: Intimate Partner Violence Past Fear: Not on file    • Social Determinants: Intimate Partner Violence Current Fear: Not on file     Family History   Problem Relation Age of Onset   • Heart Mother    • Hypertension Mother    • Heart disease Mother    • Thyroid Mother    • Cancer Father    • Kidney disease Other         dialysis   • Stroke Other    • Asthma Other    • Arthritis Other    • Diabetes Other    • Thyroid Other    • Other Other         bone & joint   • Bleeding Disorder Other        Current Outpatient Medications   Medication Sig   • traZODONE (DESYREL) 50 MG tablet Take 50 mg by mouth nightly.   • azithromycin (ZITHROMAX) 500 MG tablet Take 1 tablet by mouth daily.   • benzonatate (TESSALON) 200 MG capsule Take 1 capsule by mouth 3 times  daily as needed for Cough.   • NATALizumab (TYSABRI) 300 MG/15ML injection    • PREDNISONE PO Take 60 mg by mouth 1 time. Patient on a tapering dose of prednisone   • divalproex (DEPAKOTE) 250 MG EC tablet Take 500 mg by mouth 2 times daily. 250 mg in am, 500 mg in hs   • oxycodone (ROXICODONE) 30 MG immediate release tablet Take 20 mg by mouth every 4 hours as needed for Pain (max of 4 tablets/day).    • docusate sodium (COLACE) 100 MG capsule Take 1 capsule by mouth 3 times daily as needed for Constipation.   • oxyCODONE SR (OXYCONTIN) 40 MG 12 hr tablet Take 60 mg by mouth daily. Indications: Moderate to Severe Chronic Pain    • amphetamine-dextroamphetamine (ADDERALL) 20 MG tablet Take 20 mg by mouth 2 times daily.   • ondansetron (ZOFRAN) 4 MG disintegrating tablet Take 4 mg by mouth every 8 hours as needed for Nausea.   • DIAZepam (VALIUM) 5 MG tablet Take 1 tablet by mouth 3 times daily as needed for Muscle spasms. (Patient taking differently: Take 2 mg by mouth 3 times daily as needed (Dizzeness). Indications: Dizzeness )   • acetaminophen (TYLENOL) 500 MG tablet Take 1,000 mg by mouth daily as needed for Pain (Head Ache). Dose: 2 tablets = 1,000 mg  Indications: Pain   • meclizine 25 MG tablet Take 1 tablet by mouth 3 times daily as needed for Dizziness.   • lisinopril (ZESTRIL) 20 MG tablet Take 20 mg by mouth daily.     No current facility-administered medications for this encounter.        ALLERGIES:  Gabapentin    OBJECTIVE:  Vital Signs:    Visit Vitals  /59 (BP Location: LUE - Left upper extremity, Patient Position: Sitting)   Pulse 73   Temp 97.5 °F (36.4 °C) (Oral)   Resp 18         Physical Exam:  General appearance: Appears stated age, Alert, well-developed, well-nourished, oriented to person, place, time and situation, in no distress and cooperative  Integumentary   BLE with wounds improving, per mom who removed VCC bandages client with some green drainage most noted to LLE.      7/20/23  Assessment and Plan:     Assessment	  82y Male with PMHx of TIA (dx 2 years ago, did not feel any symptoms, reports his PCP does not think he had a TIA but his neurologist did so he takes asa/plavix daily), BPH, DM, diabetic neuropathy, HLD presents to ED for gait ataxia starting 1530 today after having lunch. Walks with a cane at baseline and his gait was worse. Fell at 430pm, no LOC. Reports has chronic bilateral LE weakness and chronic dizziness. CT imaging unremarkable. Case discussed with Dr. Agustin prior to urgent intervention. Risk and benefits of tenecteplase were discussed with patient member including risk of symptomatic ICH/death. Decision was made that benefits outweighed risks and tenecteplase was administered at 1830 on 8/11, stability CTH on 8/12 w/ no bleed. Now pending MRI and echo.     Neuro  #CVA workup  - Holding ASA 81mg and Plavix 75mg iso thrombocytopenia  - continue atorvastatin 80mg daily  - q4hr stroke neuro checks and vitals  - f/u MRI Brain without contrast  - Stroke Code HCT Results: negative  - Stroke Code CTA Results: Small amount of calcified plaque at the cavernous segments of the bilateral internal carotid arteries. The anterior and middle cerebral arteries are patent at their 1st and 2nd order segments, and appear symmetric caliber. The posterior circulation shows no high grade stenosis or occlusion. Mildly hypoplastic right P1 segment with a prominent posterior communicating artery. Trace amount of calcified plaque in the right V4 segment. 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  - Stroke education    Cards  #HTN  - Gradual Normotension.  - f/u TTE    #HLD  - high dose statin as above in CVA  - LDL results: 76    Pulm  - call provider if SPO2 < 94%    GI  #Nutrition/Fluids/Electrolytes   - replete K<4 and Mg <2  - Diet: CCD    Renal  - daily BMP    ID  #fevers  Pt has been having fevers for 2 days, complains of rhinorrhea and cough for several days. BCx pending, CXR and U/A negative; RVP negative. Possible source RUE hematoma, as area appears more swollen with warmth on 8/13, appears stable today.  - BCx: NGTD  - B/L LE and RUE  dopplers : Negative.    HEME  #Thrombocytopenia  Plt decreasing since admission, down to 118 8/13 AM, possibly 2/2 large forming hematoma in RUE, now stable with no active bleeding and Plts on 8/14 @122  - Trend CBC, holding aspirin, plavix    Endocrine  #DM  - A1C results: 5%  - ac/hs fingersticks  - Home regimen Metformin BID and Mounjaro inJ once a week X 19 weeks, held while inpt.    - TSH results: 1.170    DVT Prophylaxis  - SQL for DVT PPx.  - SCDs for DVT prophylaxis       Dispo: [facility] vs home PT, pending Re eval by PT.      Discussed daily hospital plans and goals with patient.    Discussed with Neurology Attending Dr. Fields and Stroke fellow Dr. Lew during rounds.     LLE anterior                                          7/20/23 RLE anterior        7/20/23 L lateral                                               7/20/23  LLE lateral           7/13/23 RLE      7/13/23 LLE posterior      7/13/23 LLE Lateral              7/6/23 L lateral knee                                        7/6/23 L lateral distal          7/6/23 R medial leg                                             7/6/23 R anterior leg            6/29/23 L lateral knee        5/26/23 L lateral knee chronic wound             5/26/23 RLE healed          RLE 5-12-23                                                     LLE 5/12/23 5-12-23 L lateral wound             Wound Bed Quality:  See above      Ludy-wound Quality:    Edema    Additional Descriptors:  drainage    Wound Measurements Per Flowsheet:       Wound Leg Right Lower;Lateral (Active)   Number of days:        Wound Leg Left Anterior;Inferior/Lower (Active)   Number of days: 876       Wound Leg Left Anterior;Superior/Upper (Active)   Number of days: 876       Wound Leg Right Anterior (Active)   Number of days: 876       Wound Leg Right Posterior (Active)   Number of days: 876       Wound Leg Right Anterior (Active)   Number of days: 818       Wound Leg Right Medial (Active)   Number of days: 818       Wound Leg Left Lateral;Superior/Upper (Active)   Number of days: 813       Wound Knee Left Lateral (Active)   Wound Length (cm) 4.5 cm 07/20/23 1330   Wound Width (cm) 2.1 cm 07/20/23 1330   Wound Depth (cm) 0.05 cm 07/20/23 1330   Wound Surface Area (cm^2) 9.45 cm^2 07/20/23 1330   Wound Volume (cm^3) 0.4725 cm^3 07/20/23 1330   Number of days:        Wound Leg Left Anterior (Active)   Number of days: 373       Wound Knee Right Anterior Abrasion (Active)   Number of days: 366       Wound Buttock Right (Active)   Number of days: 170       Wound Leg Left Anterior (Active)   Wound Length (cm) 5 cm 07/20/23 1330   Wound Width (cm) 2.4 cm 07/20/23 1330   Wound Depth  (cm) 0.1 cm 07/20/23 1330   Wound Surface Area (cm^2) 12 cm^2 07/20/23 1330   Wound Volume (cm^3) 1.2 cm^3 07/20/23 1330   Number of days: 79       Wound Leg Right Anterior (Active)   Wound Length (cm) 2.8 cm 07/20/23 1330   Wound Width (cm) 1.8 cm 07/20/23 1330   Wound Depth (cm) 0.1 cm 07/20/23 1330   Wound Surface Area (cm^2) 5.04 cm^2 07/20/23 1330   Wound Volume (cm^3) 0.504 cm^3 07/20/23 1330   Number of days: 79       Wound Leg Right Medial (Active)   Wound Length (cm) 4.5 cm 07/13/23 1600   Wound Width (cm) 3 cm 07/13/23 1600   Wound Depth (cm) 0.05 cm 07/13/23 1600   Wound Surface Area (cm^2) 13.5 cm^2 07/13/23 1600   Wound Volume (cm^3) 0.675 cm^3 07/13/23 1600   Number of days: 14       Wound Calf Left Posterior (Active)   Wound Length (cm) 4 cm 07/13/23 1600   Wound Width (cm) 3 cm 07/13/23 1600   Wound Depth (cm) 0.05 cm 07/13/23 1600   Wound Surface Area (cm^2) 12 cm^2 07/13/23 1600   Wound Volume (cm^3) 0.6 cm^3 07/13/23 1600   Number of days: 14         PROCEDURE:  None performed  Not indicated    Procedure was Performed by:  Not applicable    Laboratory assessments reviewed:  No results found for: \"PAB\"   Albumin (g/dL)   Date Value   07/21/2015 3.5   02/26/2015 3.2 (L)   02/23/2015 3.5      No results available in last 24 hours    Lab Results   Component Value Date    WBC 7.1 07/21/2015    GLUCOSE 94 07/21/2015    CRP 1.8 (H) 07/21/2015    RESR 27 (H) 07/21/2015    CREATININE 1.14 06/25/2021    GFRA >60 07/21/2015    GFRNA >60 07/21/2015        Culture results:  Specimen Description (no units)   Date Value   06/30/2014 BLOOD, PERIPHERAL RIGHT HAND   06/30/2014 BLOOD, PERIPHERAL LEFT AC    CULTURE (no units)   Date Value   06/30/2014 NO GROWTH 5 DAYS.   06/30/2014 NO GROWTH 5 DAYS.        Diagnostic Assessments Reviewed:  MRI     Nutritional Assessment:  Prealbumin and/or Albumin reviewed    Wound treatment goals are palliative:  No    DIAGNOSES:  See below   No diagnosis found.      Medical  Assessment and Plan:     Assessment	  82y Male with PMHx of TIA (dx 2 years ago, did not feel any symptoms, reports his PCP does not think he had a TIA but his neurologist did so he takes asa/plavix daily), BPH, DM, diabetic neuropathy, HLD presents to ED for gait ataxia starting 1530 today after having lunch. Walks with a cane at baseline and his gait was worse. Fell at 430pm, no LOC. Reports has chronic bilateral LE weakness and chronic dizziness. CT imaging unremarkable. Case discussed with Dr. Agustin prior to urgent intervention. Risk and benefits of tenecteplase were discussed with patient member including risk of symptomatic ICH/death. Decision was made that benefits outweighed risks and tenecteplase was administered at 1830 on 8/11, stability CTH on 8/12 w/ no bleed. Now pending MRI and echo.     Neuro  #CVA workup  - Stable /improved Platelets, Re started DAPT  - continue atorvastatin 80mg daily  - q4hr stroke neuro checks and vitals  - f/u MRI Brain without contrast  - Stroke Code HCT Results: negative  - Stroke Code CTA Results: Small amount of calcified plaque at the cavernous segments of the bilateral internal carotid arteries. The anterior and middle cerebral arteries are patent at their 1st and 2nd order segments, and appear symmetric caliber. The posterior circulation shows no high grade stenosis or occlusion. Mildly hypoplastic right P1 segment with a prominent posterior communicating artery. Trace amount of calcified plaque in the right V4 segment. 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  - Stroke education    Cards  #HTN  - Gradual Normotension.  - Symptomatic Orthostatic Hypotension  - started on Midodrine 5mg TID, Hold for SBP > 180.  - f/u TTE    #HLD  - high dose statin as above in CVA  - LDL results: 76    Pulm  - call provider if SPO2 < 94%    GI  #Nutrition/Fluids/Electrolytes   - replete K<4 and Mg <2  - Diet: CCD    Renal  - daily BMP    ID  #fevers  Pt has been having fevers for 2 days, complains of rhinorrhea and cough for several days. BCx pending, CXR and U/A negative; RVP negative. Possible source RUE hematoma, as area appears more swollen with warmth on 8/13, appears stable today.  - BCx: NGTD  - B/L LE and RUE  dopplers : Negative.    HEME  #Thrombocytopenia  Plt decreasing since admission, down to 118 8/13 AM, possibly 2/2 large forming hematoma in RUE, now stable with no active bleeding and Plts on 8/14 @122  - Trend CBC, stable Hb  - Restarted DAPT.    Endocrine  #DM  - A1C results: 5%  - ac/hs fingersticks  - Home regimen Metformin BID and Mounjaro inJ once a week X 19 weeks, held while inpt.    - TSH results: 1.170    DVT Prophylaxis  - SQL for DVT PPx.  - SCDs for DVT prophylaxis       Dispo: [facility] vs home PT, pending Re eval by PT.      Discussed daily hospital plans and goals with patient.    Discussed with Neurology Attending Dr. Fields and Stroke fellow Dr. Lew during rounds.     Decision Makin-year-old male has been coming into clinic for chronic venous stasis wounds, was healed on 23 and new wounds opened to both legs soon after. Wraps revealed yellow/green drainage continues per mom mostly LLE, will continue acetic acid soak for 10 min.  L lateral knee remains with chronic non-healing wounds which have improved in size.  Continue HFB ready to lateral knee wound.  VCC wraps which have been effective, will plan on 2 times/week change at this time..  Suspect he will need to continue wraps longterm due to numerous failures in circaid and tetra compression    Wound care:  Wash with mild soap and water, pat dry  Acetic acid soak X 10 min  HFB ready to L lateral wound  VCC  Change twice per week may reduce if drainage decreases, will continue to monitor    Wound care follow-up in clinic 2/week, provider visit 1 week    Plan of Care:  Advanced Wound Care Recommendations:  See above  Percent Wound Closure from consult:  See above  Care plan to augment wound closure:  Not applicable.  See above    Patient stable. All questions were answered.       Kimberley Kramer, MSN, FNP-C, APNP  Nurse Practitioner [facility] [address] [place] [address]  T [phone]  F [phone]      Total time spent today on this visit is 23 minutes which includes preparing to see the patient by reviewing prior records, obtaining and reviewing history, performing a physical exam, counseling the patient ,documenting clinical information in the medical record

## 2023-08-14 NOTE — PROGRESS NOTE ADULT - SUBJECTIVE AND OBJECTIVE BOX
Feeling okay this morning.  Has chronic bilateral shin pain, as well as lower extremity swelling.      Remaining ROS negative     PHYSICAL EXAM:    General: no acute distress, sitting up in chair  HEENT: NC/AT; MMM  Cardiovascular: +S1/S2, RRR  Respiratory: CTA B/L; no W/R/R  Gastrointestinal: soft, NT/ND; +BSx4  Extremities: WWP; trace nonpitting edema in bilateral lower extremities  Neurological: speech is fluent, follows commands, no facial asymmetry  Psychiatric: pleasant mood and affect    VITAL SIGNS:  Vital Signs Last 24 Hrs  T(C): 37.2 (14 Aug 2023 09:10), Max: 37.2 (14 Aug 2023 09:10)  T(F): 98.9 (14 Aug 2023 09:10), Max: 98.9 (14 Aug 2023 09:10)  HR: 64 (14 Aug 2023 12:00) (56 - 64)  BP: 122/60 (14 Aug 2023 12:00) (107/56 - 144/75)  BP(mean): 86 (14 Aug 2023 12:00) (75 - 104)  RR: 18 (14 Aug 2023 12:00) (13 - 20)  SpO2: 95% (14 Aug 2023 12:00) (92% - 96%)    Parameters below as of 14 Aug 2023 12:00  Patient On (Oxygen Delivery Method): room air    MEDICATIONS:  MEDICATIONS  (STANDING):  buPROPion XL (24-Hour) . 300 milliGRAM(s) Oral daily  calcium carbonate    500 mG (Tums) Chewable 1 Tablet(s) Chew daily  cholecalciferol 1000 Unit(s) Oral daily  dextrose 5%. 1000 milliLiter(s) (50 mL/Hr) IV Continuous <Continuous>  dextrose 5%. 1000 milliLiter(s) (100 mL/Hr) IV Continuous <Continuous>  dextrose 50% Injectable 25 Gram(s) IV Push once  dextrose 50% Injectable 12.5 Gram(s) IV Push once  dextrose 50% Injectable 25 Gram(s) IV Push once  enoxaparin Injectable 40 milliGRAM(s) SubCutaneous every 24 hours  finasteride 5 milliGRAM(s) Oral daily  gabapentin 800 milliGRAM(s) Oral two times a day  glucagon  Injectable 1 milliGRAM(s) IntraMuscular once  polyethylene glycol 3350 17 Gram(s) Oral daily  senna 2 Tablet(s) Oral at bedtime  tamsulosin 0.4 milliGRAM(s) Oral at bedtime    MEDICATIONS  (PRN):  acetaminophen     Tablet .. 650 milliGRAM(s) Oral every 6 hours PRN Temp greater or equal to 38C (100.4F), Mild Pain (1 - 3)  dextrose Oral Gel 15 Gram(s) Oral once PRN Blood Glucose LESS THAN 70 milliGRAM(s)/deciliter      ALLERGIES:  Allergies    No Known Allergies    Intolerances    LABS:                        12.1   4.17  )-----------( 122      ( 14 Aug 2023 06:37 )             34.7     08-14    137  |  103  |  19  ----------------------------<  74  4.1   |  23  |  1.11    Ca    8.5      14 Aug 2023 06:37  Phos  3.0     08-14  Mg     1.9     08-14      Urinalysis Basic - ( 14 Aug 2023 06:37 )    Color: x / Appearance: x / SG: x / pH: x  Gluc: 74 mg/dL / Ketone: x  / Bili: x / Urobili: x   Blood: x / Protein: x / Nitrite: x   Leuk Esterase: x / RBC: x / WBC x   Sq Epi: x / Non Sq Epi: x / Bacteria: x      CAPILLARY BLOOD GLUCOSE      POCT Blood Glucose.: 85 mg/dL (12 Aug 2023 23:13)      RADIOLOGY & ADDITIONAL TESTS: Reviewed.

## 2023-08-14 NOTE — PROGRESS NOTE ADULT - SUBJECTIVE AND OBJECTIVE BOX
Physical Medicine and Rehabilitation Progress Note :       Patient is a 82y old  Male who presents with a chief complaint of Dizziness s/p TNK (14 Aug 2023 08:00)      HPI:  82y Male with PMHx of TIA (dx 2 years ago, did not feel any symptoms, reports his PCP does not think he had a TIA but his neurologist did so he takes asa/plavix daily), BPH, type 1 DM, diabetic neuropathy, HLD presents to ED for gait ataxia starting 1530 (8/11) after having lunch. Walks with a cane at baseline and his gait was worse. Fell at 430pm, no LOC. Reports has chronic bilateral LE weakness and chronic dizziness. CT imaging unremarkable. Decision was made that benefits outweighed risks and tenecteplase was administered at 1830.      (11 Aug 2023 20:25)                            12.1   4.17  )-----------( 122      ( 14 Aug 2023 06:37 )             34.7       08-14    137  |  103  |  19  ----------------------------<  74  4.1   |  23  |  1.11    Ca    8.5      14 Aug 2023 06:37  Phos  3.0     08-14  Mg     1.9     08-14      Vital Signs Last 24 Hrs  T(C): 37.2 (14 Aug 2023 09:10), Max: 37.2 (14 Aug 2023 09:10)  T(F): 98.9 (14 Aug 2023 09:10), Max: 98.9 (14 Aug 2023 09:10)  HR: 57 (14 Aug 2023 08:24) (56 - 62)  BP: 107/56 (14 Aug 2023 08:24) (107/56 - 144/75)  BP(mean): 77 (14 Aug 2023 08:24) (75 - 104)  RR: 18 (14 Aug 2023 08:24) (13 - 20)  SpO2: 95% (14 Aug 2023 08:24) (92% - 96%)    Parameters below as of 14 Aug 2023 08:24  Patient On (Oxygen Delivery Method): room air        MEDICATIONS  (STANDING):  buPROPion XL (24-Hour) . 300 milliGRAM(s) Oral daily  calcium carbonate    500 mG (Tums) Chewable 1 Tablet(s) Chew daily  cholecalciferol 1000 Unit(s) Oral daily  dextrose 5%. 1000 milliLiter(s) (100 mL/Hr) IV Continuous <Continuous>  dextrose 5%. 1000 milliLiter(s) (50 mL/Hr) IV Continuous <Continuous>  dextrose 50% Injectable 12.5 Gram(s) IV Push once  dextrose 50% Injectable 25 Gram(s) IV Push once  dextrose 50% Injectable 25 Gram(s) IV Push once  enoxaparin Injectable 40 milliGRAM(s) SubCutaneous every 24 hours  finasteride 5 milliGRAM(s) Oral daily  gabapentin 800 milliGRAM(s) Oral two times a day  glucagon  Injectable 1 milliGRAM(s) IntraMuscular once  polyethylene glycol 3350 17 Gram(s) Oral daily  senna 2 Tablet(s) Oral at bedtime  tamsulosin 0.4 milliGRAM(s) Oral at bedtime    MEDICATIONS  (PRN):  acetaminophen     Tablet .. 650 milliGRAM(s) Oral every 6 hours PRN Temp greater or equal to 38C (100.4F), Mild Pain (1 - 3)  dextrose Oral Gel 15 Gram(s) Oral once PRN Blood Glucose LESS THAN 70 milliGRAM(s)/deciliter         Functional Status Assessment :   8/13/2023    Previous Level of Function:     · Bed Mobility/Transfers	independent  · Bathing	independent  · Upper Body Dressing	independent  · Lower Body Dressing	independent  · Grooming	independent  · Toileting	independent  · Eating	independent  · Home Management Skills	independent  · Additional Comments	pt. resides alone in an elevator accessible apartment with one step to enter in the lobby. pt. reports I prior in ADLs and use of SC for functional mob/transfers. BR with standard toilet with grab bars and grab bars in the shower. Pt. reports history of frequent falls. He wears glasses and is R handed    · Ambulatory Devices Needed	yes  SC  · Adaptive Equipment Needed	no    Cognitive Status Examination:   · Level of Consciousness	alert  · Orientation	oriented to person, place, time and situation  · Follow Commands/Answers Questions	100% of the time  · Personal Safety and Judgment	intact  · Short Term Memory	intact  · Long Term Memory	intact    Range of Motion Exam:   · Range of Motion Examination, Upper Extremity	bilateral UE Active ROM was WFL  (within functional limits); bilateral UE Passive ROM was WFL  (within functional limits)  · Range of Motion Examination, Lower Extremity	bilateral LE Active ROM was WFL  (within functional limits); bilateral LE Passive ROM was WFL  (within functional limits)    Manual Muscle Testing:   · Manual Muscle Testing Results	5/5 at all major pivot points    Muscle Tone Assessment:   · Muscle Tone Assessment	normal    Bed Mobility: Rolling/Turning:     · Level of [place]	independent    Bed Mobility: Scooting/Bridging:     · Level of [place]	independent    Bed Mobility: Sit to Supine:     · Level of [place]	independent    Bed Mobility: Supine to Sit:     · Level of [place]	independent    Transfer: Sit to Stand:     · Level of [place]	contact guard  · Physical Assist/Nonphysical Assist	verbal cues  · Assistive Device	rolling walker    Transfer: Stand to Sit:     · Level of [place]	contact guard  · Physical Assist/Nonphysical Assist	verbal cues  · Assistive Device	rolling walker    Sit/Stand Transfer Safety Analysis:     · Transfer Safety Concerns Noted	decreased balance during turns  · Impairments Contributing to Impaired Transfers	dizziness; impaired balance    Balance Skills Assessment:     · Sitting Balance: Static	good minus  · Sitting Balance: Dynamic	good minus  · Sit-to-Stand Balance	fair plus  · Standing Balance: Static	fair plus  · Standing Balance: Dynamic	fair plus  · Systems Impairment Contributing to Balance Disturbance	vestibular  · Identified Impairments Contributing to Balance Disturbance	dizziness    Sensory Examination:   · Balance Skills	Pt. unable to perform functional mob at this time 2/2 +orthostatics and increased dizziness    · Coordination Assessed	finger to nose      Proprioception:   · Coordination Assessed	finger to nose      Visual Assessment:   Visual Assessment:    Visual Assessment:   · Visual Tracking	normal  · Visual Neglect	none  · Eye Muscle Balance	normal  · Visual Scanning	WFL  · Visual Convergence	normal    Fine Motor Coordination:     Fine Motor Coordination:   · Left Hand, Finger to Nose	normal performance  · Right Hand, Finger to Nose	normal performance  · Left Hand Thumb/Finger Opposition Skills	normal performance  · Right Hand Thumb/Finger Opposition Skills	normal performance  · Left Hand, Diadochokinesis Skills	normal performance  · Right Hand, Diadochokinesis Skills	normal performance    Upper Body Dressing Training:     · Level of [place]	supervision    Lower Body Dressing Training:     · Level of [place]	stand-by assist    Treatment Locations:   · Comments	Cranial Nerves II - XII: II: PERRLA; visual fields are full to confrontation III, IV, VI: EOMI, no nystagmus appreciated V: facial sensation intact to light touch V1-V3 b/l VII: no ptosis, no facial droop, symmetric eyebrow raise and closure VIII: hearing intact to finger rub b/l  XI: head turning and shoulder shrug intact b/l XII: tongue protrusion midline, Vision H test- smooth b/l pursuits, Visual Quadrant test- WNL            PM&R Impression : as above    Current Disposition Plan Recommendations :    pending functional progress

## 2023-08-15 LAB
ANION GAP SERPL CALC-SCNC: 7 MMOL/L — SIGNIFICANT CHANGE UP (ref 5–17)
BUN SERPL-MCNC: 18 MG/DL — SIGNIFICANT CHANGE UP (ref 7–23)
CALCIUM SERPL-MCNC: 8.7 MG/DL — SIGNIFICANT CHANGE UP (ref 8.4–10.5)
CHLORIDE SERPL-SCNC: 105 MMOL/L — SIGNIFICANT CHANGE UP (ref 96–108)
CO2 SERPL-SCNC: 26 MMOL/L — SIGNIFICANT CHANGE UP (ref 22–31)
CREAT SERPL-MCNC: 1.09 MG/DL — SIGNIFICANT CHANGE UP (ref 0.5–1.3)
EGFR: 69 ML/MIN/1.73M2 — SIGNIFICANT CHANGE UP
GLUCOSE SERPL-MCNC: 82 MG/DL — SIGNIFICANT CHANGE UP (ref 70–99)
HCT VFR BLD CALC: 33.8 % — LOW (ref 39–50)
HGB BLD-MCNC: 11.7 G/DL — LOW (ref 13–17)
MAGNESIUM SERPL-MCNC: 1.8 MG/DL — SIGNIFICANT CHANGE UP (ref 1.6–2.6)
MCHC RBC-ENTMCNC: 33.9 PG — SIGNIFICANT CHANGE UP (ref 27–34)
MCHC RBC-ENTMCNC: 34.6 GM/DL — SIGNIFICANT CHANGE UP (ref 32–36)
MCV RBC AUTO: 98 FL — SIGNIFICANT CHANGE UP (ref 80–100)
NRBC # BLD: 0 /100 WBCS — SIGNIFICANT CHANGE UP (ref 0–0)
PHOSPHATE SERPL-MCNC: 2.4 MG/DL — LOW (ref 2.5–4.5)
PLATELET # BLD AUTO: 127 K/UL — LOW (ref 150–400)
POTASSIUM SERPL-MCNC: 4 MMOL/L — SIGNIFICANT CHANGE UP (ref 3.5–5.3)
POTASSIUM SERPL-SCNC: 4 MMOL/L — SIGNIFICANT CHANGE UP (ref 3.5–5.3)
RBC # BLD: 3.45 M/UL — LOW (ref 4.2–5.8)
RBC # FLD: 12.9 % — SIGNIFICANT CHANGE UP (ref 10.3–14.5)
SODIUM SERPL-SCNC: 138 MMOL/L — SIGNIFICANT CHANGE UP (ref 135–145)
WBC # BLD: 3.45 K/UL — LOW (ref 3.8–10.5)
WBC # FLD AUTO: 3.45 K/UL — LOW (ref 3.8–10.5)

## 2023-08-15 PROCEDURE — 70551 MRI BRAIN STEM W/O DYE: CPT | Mod: 26

## 2023-08-15 PROCEDURE — 99233 SBSQ HOSP IP/OBS HIGH 50: CPT

## 2023-08-15 RX ORDER — MIDODRINE HYDROCHLORIDE 2.5 MG/1
7.5 TABLET ORAL THREE TIMES A DAY
Refills: 0 | Status: DISCONTINUED | OUTPATIENT
Start: 2023-08-15 | End: 2023-08-18

## 2023-08-15 RX ADMIN — TAMSULOSIN HYDROCHLORIDE 0.4 MILLIGRAM(S): 0.4 CAPSULE ORAL at 21:29

## 2023-08-15 RX ADMIN — Medication 650 MILLIGRAM(S): at 17:50

## 2023-08-15 RX ADMIN — BUPROPION HYDROCHLORIDE 300 MILLIGRAM(S): 150 TABLET, EXTENDED RELEASE ORAL at 12:15

## 2023-08-15 RX ADMIN — Medication 81 MILLIGRAM(S): at 12:16

## 2023-08-15 RX ADMIN — Medication 650 MILLIGRAM(S): at 06:16

## 2023-08-15 RX ADMIN — ENOXAPARIN SODIUM 40 MILLIGRAM(S): 100 INJECTION SUBCUTANEOUS at 12:15

## 2023-08-15 RX ADMIN — Medication 650 MILLIGRAM(S): at 05:25

## 2023-08-15 RX ADMIN — Medication 650 MILLIGRAM(S): at 18:50

## 2023-08-15 RX ADMIN — MIDODRINE HYDROCHLORIDE 7.5 MILLIGRAM(S): 2.5 TABLET ORAL at 12:16

## 2023-08-15 RX ADMIN — MIDODRINE HYDROCHLORIDE 5 MILLIGRAM(S): 2.5 TABLET ORAL at 05:26

## 2023-08-15 RX ADMIN — GABAPENTIN 800 MILLIGRAM(S): 400 CAPSULE ORAL at 05:26

## 2023-08-15 RX ADMIN — FINASTERIDE 5 MILLIGRAM(S): 5 TABLET, FILM COATED ORAL at 12:15

## 2023-08-15 RX ADMIN — Medication 1 TABLET(S): at 12:16

## 2023-08-15 RX ADMIN — CLOPIDOGREL BISULFATE 75 MILLIGRAM(S): 75 TABLET, FILM COATED ORAL at 12:15

## 2023-08-15 RX ADMIN — GABAPENTIN 800 MILLIGRAM(S): 400 CAPSULE ORAL at 17:50

## 2023-08-15 RX ADMIN — MIDODRINE HYDROCHLORIDE 7.5 MILLIGRAM(S): 2.5 TABLET ORAL at 17:50

## 2023-08-15 RX ADMIN — Medication 1000 UNIT(S): at 12:15

## 2023-08-15 NOTE — DIETITIAN INITIAL EVALUATION ADULT - NS FNS DIET ORDER
Diet, Consistent Carbohydrate/No Snacks:   DASH/TLC {Sodium & Cholesterol Restricted} (DASH) (08-12-23 @ 05:14) [Active]

## 2023-08-15 NOTE — DIETITIAN INITIAL EVALUATION ADULT - OTHER CALCULATIONS
Based on Standards of Care pt exceeds % IBW (156%) thus ideal body weight used for all calculations. Needs adjusted for advanced age and malnutrition.

## 2023-08-15 NOTE — PROGRESS NOTE ADULT - ASSESSMENT
Assessment and Plan:     Assessment	  82y Male with PMHx of TIA (dx 2 years ago, did not feel any symptoms, reports his PCP does not think he had a TIA but his neurologist did so he takes asa/plavix daily), BPH, DM, diabetic neuropathy, HLD presents to ED for gait ataxia starting 1530 today after having lunch. Walks with a cane at baseline and his gait was worse. Fell at 430pm, no LOC. Reports has chronic bilateral LE weakness and chronic dizziness. CT imaging unremarkable. Case discussed with Dr. Agustin prior to urgent intervention. Risk and benefits of tenecteplase were discussed with patient member including risk of symptomatic ICH/death. Decision was made that benefits outweighed risks and tenecteplase was administered at 1830 on 8/11, stability CTH on 8/12 w/ no bleed. Now pending MRI and echo.     Neuro  #CVA workup  - platelet count steadily improving, continue with aspirin 81mg and plavix 75mg qd   - continue atorvastatin 80mg daily  - q4hr stroke neuro checks and vitals  - f/u MRI Brain without contrast  - Stroke Code HCT Results: negative  - Stroke Code CTA Results: Small amount of calcified plaque at the cavernous segments of the bilateral internal carotid arteries. The anterior and middle cerebral arteries are patent at their 1st and 2nd order segments, and appear symmetric caliber. The posterior circulation shows no high grade stenosis or occlusion. Mildly hypoplastic right P1 segment with a prominent posterior communicating artery. Trace amount of calcified plaque in the right V4 segment. 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  - Stroke education    Cards  #HTN  - Gradual Normotension.  - Symptomatic Orthostatic Hypotension  - increased midodrine to 7.5mg TID, if patient has SBP >180 while supine, we will need to sit patient up and recheck the BP  - TTE showing mild dilation of the aortic root     #HLD  - high dose statin as above in CVA  - LDL results: 76    Pulm  - call provider if SPO2 < 94%    GI  #Nutrition/Fluids/Electrolytes   - replete K<4 and Mg <2  - Diet: CCD    Renal  - daily BMP    ID  #fevers  Pt has been having fevers for 2 days, complains of rhinorrhea and cough for several days. BCx pending, CXR and U/A negative; RVP negative. Possible source RUE hematoma, as area appears more swollen with warmth on 8/13, appears stable today.  - BCx: NGTD  - B/L LE and RUE  dopplers : Negative.    HEME  #Thrombocytopenia  Plt decreasing since admission, down to 118 8/13 AM, possibly 2/2 large forming hematoma in RUE, now stable with no active bleeding   - Trend CBC, stable Hb  - c/w DAPT    Endocrine  #DM  - A1C results: 5%  - ac/hs fingersticks  - Home regimen Metformin BID and Mounjaro inJ once a week X 19 weeks, held while inpt.    - TSH results: 1.170    DVT Prophylaxis  - SQL for DVT PPx.  - SCDs for DVT prophylaxis       Dispo: AR vs home PT, pending Re eval by PT.      Discussed daily hospital plans and goals with patient.    Discussed with Neurology Attending Dr. Fields and Stroke fellow Dr. Lew during rounds.

## 2023-08-15 NOTE — PROGRESS NOTE ADULT - SUBJECTIVE AND OBJECTIVE BOX
Patient is a 82y old  Male who presents with a chief complaint of "82M with PMH of TIA, DM2 with neuropathy, BPH and HLD presenting with worsening ataxia causing a fall.  S/p tenecteplase and monitored in the ICU, then stable for transfer to [facility] stroke and pending further workup. " (15 Aug 2023 11:16)      SUBJECTIVE / OVERNIGHT EVENTS:    ROS:  All other review of systems negative    Allergies    No Known Allergies    Intolerances    MEDICATIONS  (STANDING):  aspirin enteric coated 81 milliGRAM(s) Oral daily  buPROPion XL (24-Hour) . 300 milliGRAM(s) Oral daily  calcium carbonate    500 mG (Tums) Chewable 1 Tablet(s) Chew daily  cholecalciferol 1000 Unit(s) Oral daily  clopidogrel Tablet 75 milliGRAM(s) Oral daily  dextrose 5%. 1000 milliLiter(s) (100 mL/Hr) IV Continuous <Continuous>  dextrose 5%. 1000 milliLiter(s) (50 mL/Hr) IV Continuous <Continuous>  dextrose 50% Injectable 25 Gram(s) IV Push once  dextrose 50% Injectable 12.5 Gram(s) IV Push once  dextrose 50% Injectable 25 Gram(s) IV Push once  enoxaparin Injectable 40 milliGRAM(s) SubCutaneous every 24 hours  finasteride 5 milliGRAM(s) Oral daily  gabapentin 800 milliGRAM(s) Oral two times a day  glucagon  Injectable 1 milliGRAM(s) IntraMuscular once  midodrine. 7.5 milliGRAM(s) Oral three times a day  polyethylene glycol 3350 17 Gram(s) Oral daily  senna 2 Tablet(s) Oral at bedtime  tamsulosin 0.4 milliGRAM(s) Oral at bedtime    MEDICATIONS  (PRN):  acetaminophen     Tablet .. 650 milliGRAM(s) Oral every 6 hours PRN Temp greater or equal to 38C (100.4F), Mild Pain (1 - 3)  dextrose Oral Gel 15 Gram(s) Oral once PRN Blood Glucose LESS THAN 70 milliGRAM(s)/deciliter      Vital Signs Last 24 Hrs  T(C): 36.9 (15 Aug 2023 14:43), Max: 37.1 (14 Aug 2023 22:03)  T(F): 98.5 (15 Aug 2023 14:43), Max: 98.8 (14 Aug 2023 22:03)  HR: 53 (15 Aug 2023 14:00) (49 - 85)  BP: 144/74 (15 Aug 2023 14:00) (100/59 - 144/74)  BP(mean): 101 (15 Aug 2023 14:00) (72 - 101)  RR: 18 (15 Aug 2023 12:05) (16 - 20)  SpO2: 95% (15 Aug 2023 12:05) (94% - 97%)    Parameters below as of 15 Aug 2023 12:05  Patient On (Oxygen Delivery Method): room air      CAPILLARY BLOOD GLUCOSE    I&O's Summary    14 Aug 2023 07:01  -  15 Aug 2023 07:00  --------------------------------------------------------  IN: 1040 mL / OUT: 1240 mL / NET: -200 mL    15 Aug 2023 07:01  -  15 Aug 2023 15:22  --------------------------------------------------------  IN: 505 mL / OUT: 500 mL / NET: 5 mL        PHYSICAL EXAM:  -Mental status: Awake, alert, oriented to person, place, and time. Speech is fluent with intact naming, repetition, and comprehension, no dysarthria. Recent and remote memory intact. Follows commands. Attention/concentration intact.     -Cranial nerves:   II: Visual fields are full to confrontation.  III, IV, VI: Extraocular movements are intact without nystagmus. Pupils equally round and reactive to light.  V:  Facial sensation V1-V3 equal and intact   VII: Face is symmetric with normal eye closure and smile  VIII: Hearing is bilaterally intact to finger rub  IX, X: Uvula is midline and soft palate rises symmetrically  XI: Head turning and shoulder shrug are intact.  XII: Tongue protrudes midline  Motor: Normal bulk and tone. No pronator drift. Strength bilateral upper extremity 5/5, bilateral lower extremities 3/5 , C/O difficulty and pain while attempting to lift B/L LE and TTP + when palpating B/L shins  Sensation: chronic bilateral ankle numbness. No neglect or extinction on double simultaneous testing.    LABS:                        11.7   3.45  )-----------( 127      ( 15 Aug 2023 05:30 )             33.8     08-15    138  |  105  |  18  ----------------------------<  82  4.0   |  26  |  1.09    Ca    8.7      15 Aug 2023 05:30  Phos  2.4     08-15  Mg     1.8     08-15    Urinalysis Basic - ( 15 Aug 2023 05:30 )    Color: x / Appearance: x / SG: x / pH: x  Gluc: 82 mg/dL / Ketone: x  / Bili: x / Urobili: x   Blood: x / Protein: x / Nitrite: x   Leuk Esterase: x / RBC: x / WBC x   Sq Epi: x / Non Sq Epi: x / Bacteria: x    RADIOLOGY & ADDITIONAL TESTS:    Imaging Personally Reviewed:    Consultant(s) Notes Reviewed:      Care Discussed with Consultants/Other Providers:    Case Discussed with Family:    Goals of Care:

## 2023-08-15 NOTE — DIETITIAN INITIAL EVALUATION ADULT - NSFNSGIASSESSMENTFT_GEN_A_CORE
No issues with nausea, vomiting, diarrhea, constipation reported at time of visit. Last BM 8/14 per pt and confirmed by flow sheets. Pt is currently on a bowel regimen: Senna, polyethylene glycol.

## 2023-08-15 NOTE — DIETITIAN INITIAL EVALUATION ADULT - OTHER INFO
- Pt has T1 DM, states that he does not check his blood sugars at home. 8/12 A1C: 5.0%  - Nutritionally Pertinent labs 8/15: Phos: 2.4 (L)

## 2023-08-15 NOTE — PROGRESS NOTE ADULT - ASSESSMENT
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  S/p tenecteplase and monitored in the ICU, then stable for transfer to tele stroke and pending further workup.     #CVA workup  - received TNK, follow up scan is stable  - pending MRI  - TTE with mild aortic root dilatation - recommend outpatient follow up and repeat echo  - resuming DAPY  - rest of plan per stroke team     #fevers  -no leukocytosis, no tachycardia  -UA neg, CXR clear, RVP/covid neg  -no obvious infectious symptoms - says rhinorrhea and cough are chronic  - negative workup. No further fevrs.     #BPH  - continue flomax and finasteride    #DM  #diabetic neuropathy  - on mounjaro and metformin  - A1C is 5  - resume outpatient regimen on discahrge  - continue gabapentin at home dose for neuropathy     #thrombocytopenia  152 on admission, improved to 127 today  - no previous exposure to heparin  - continue DAPT  - if worsens, can consider a splenic ultrasound, however will hold on this additional test for now.   - recommend repeat labs in one week with primary care doctor     #normocytic anemia  - stable    50 minutes spent on this encounter, including face to face with patient, care coordination and documentation. Plan of care discussed with stroke team.

## 2023-08-15 NOTE — DIETITIAN INITIAL EVALUATION ADULT - NSFNSNUTRHOMESUPPLEMENTFT_GEN_A_CORE
States that at home pt takes Vitamin D and multivitamin. Reports no additional use of oral nutritional supplement.

## 2023-08-15 NOTE — DIETITIAN INITIAL EVALUATION ADULT - ADD RECOMMEND
1. Continue current diet as tolerated: Consistent Carbohydrate w/ No Evening Snack, DASH/TLC. Diet texture/fluid consistencies based on SLP recommendations.   2. Encourage PO intake and honor food preferences as able unless otherwise contraindicated.   3. Monitor PO intake, diet tolerance, weight trends, labs, and skin integrity and bowel movement regularity.   4. Provide ongoing education as needed.   5. RD remains available upon request and will follow-up per protocol.

## 2023-08-15 NOTE — DIETITIAN INITIAL EVALUATION ADULT - ENERGY INTAKE
Adequate (%) Pt is tolerating current diet: Consistent Carbohydrate w/ No Evening Snack, DASH/TLC. States that he consumed a muffin and cereal for breakfast today and has been consuming ~75 of lunch and dinner.  Pt is tolerating current diet: Consistent Carbohydrate w/ No Evening Snack, DASH/TLC. States that he consumed a muffin and cereal for breakfast today and has been consuming ~75% of lunch and dinner.

## 2023-08-15 NOTE — DIETITIAN INITIAL EVALUATION ADULT - PERTINENT LABORATORY DATA
08-15    138  |  105  |  18  ----------------------------<  82  4.0   |  26  |  1.09    Ca    8.7      15 Aug 2023 05:30  Phos  2.4     08-15  Mg     1.8     08-15    A1C with Estimated Average Glucose Result: 5.0 % (08-12-23 @ 05:30)

## 2023-08-15 NOTE — DIETITIAN NUTRITION RISK NOTIFICATION - MALNUTRITION EVALUATION AS DEMONSTRATED BY (ADULTS > 20 YEARS OF AGE)
Weight loss.../Loss of subcutaneous fat.../Loss of muscle.../Fluid accumulation... Loss of subcutaneous fat.../Loss of muscle.../Fluid accumulation...

## 2023-08-15 NOTE — DIETITIAN INITIAL EVALUATION ADULT - EDUCATION DIETARY MODIFICATIONS
Educated/Discussed the importance to prioritize protein at meal times. Reviewed good sources of protein on the menu. Discussed/Educated pt on the importance of consuming consistent carbohydrates throughout the day. Reviewed examples of carbohydrates on the menu. To pair carbohydrates with either a protein or fat during meal times. Pt very receptive and agreeable to education./(2) meets goals/outcomes/verbalization

## 2023-08-15 NOTE — DIETITIAN NUTRITION RISK NOTIFICATION - ADDITIONAL COMMENTS/DIETITIAN RECOMMENDATIONS
Acute Severe malnutrition related to decreased po intake x19 weeks as evidenced by severe muscle and moderate fat depletions    1. Continue current diet as tolerated: Consistent Carbohydrate w/ No Evening Snack, DASH/TLC. Diet texture/fluid consistencies based on SLP recommendations.   2. Encourage PO intake and honor food preferences as able unless otherwise contraindicated.   3. Monitor PO intake, diet tolerance, weight trends, labs, and skin integrity and bowel movement regularity.   4. Provide ongoing education as needed.   5. RD remains available upon request and will follow-up per protocol.

## 2023-08-15 NOTE — DIETITIAN INITIAL EVALUATION ADULT - PERTINENT MEDS FT
MEDICATIONS  (STANDING):  aspirin enteric coated 81 milliGRAM(s) Oral daily  buPROPion XL (24-Hour) . 300 milliGRAM(s) Oral daily  calcium carbonate    500 mG (Tums) Chewable 1 Tablet(s) Chew daily  cholecalciferol 1000 Unit(s) Oral daily  clopidogrel Tablet 75 milliGRAM(s) Oral daily  dextrose 5%. 1000 milliLiter(s) (100 mL/Hr) IV Continuous <Continuous>  dextrose 5%. 1000 milliLiter(s) (50 mL/Hr) IV Continuous <Continuous>  dextrose 50% Injectable 25 Gram(s) IV Push once  dextrose 50% Injectable 12.5 Gram(s) IV Push once  dextrose 50% Injectable 25 Gram(s) IV Push once  enoxaparin Injectable 40 milliGRAM(s) SubCutaneous every 24 hours  finasteride 5 milliGRAM(s) Oral daily  gabapentin 800 milliGRAM(s) Oral two times a day  glucagon  Injectable 1 milliGRAM(s) IntraMuscular once  midodrine. 7.5 milliGRAM(s) Oral three times a day  polyethylene glycol 3350 17 Gram(s) Oral daily  senna 2 Tablet(s) Oral at bedtime  tamsulosin 0.4 milliGRAM(s) Oral at bedtime    MEDICATIONS  (PRN):  acetaminophen     Tablet .. 650 milliGRAM(s) Oral every 6 hours PRN Temp greater or equal to 38C (100.4F), Mild Pain (1 - 3)  dextrose Oral Gel 15 Gram(s) Oral once PRN Blood Glucose LESS THAN 70 milliGRAM(s)/deciliter

## 2023-08-15 NOTE — PROGRESS NOTE ADULT - SUBJECTIVE AND OBJECTIVE BOX
Feeling okay this morning. Got up to use the bathroom a couple of times, but needs a lot of help.  Hopes that the new medication started will help with his lightheadedness.     No issues to report overnight    Remaining ROS negative     PHYSICAL EXAM:    General: no acute distress, sitting up in bed  HEENT: NC/AT; MMM  Cardiovascular: +S1/S2, RRR  Respiratory: CTA B/L; no W/R/R  Gastrointestinal: soft, NT/ND; +BSx4  Extremities: WWP; trace nonpitting edema in bilateral lower extremities  Neurological: speech is fluent, follows commands, no facial asymmetry  Psychiatric: pleasant mood and affect    VITAL SIGNS:  Vital Signs Last 24 Hrs  T(C): 36.4 (15 Aug 2023 09:56), Max: 37.1 (14 Aug 2023 22:03)  T(F): 97.6 (15 Aug 2023 09:56), Max: 98.8 (14 Aug 2023 22:03)  HR: 55 (15 Aug 2023 08:25) (49 - 64)  BP: 100/59 (15 Aug 2023 08:25) (100/59 - 122/60)  BP(mean): 73 (15 Aug 2023 08:25) (72 - 86)  RR: 16 (15 Aug 2023 08:25) (16 - 20)  SpO2: 94% (15 Aug 2023 08:25) (94% - 97%)    Parameters below as of 15 Aug 2023 08:25  Patient On (Oxygen Delivery Method): room air      MEDICATIONS:  MEDICATIONS  (STANDING):  aspirin enteric coated 81 milliGRAM(s) Oral daily  buPROPion XL (24-Hour) . 300 milliGRAM(s) Oral daily  calcium carbonate    500 mG (Tums) Chewable 1 Tablet(s) Chew daily  cholecalciferol 1000 Unit(s) Oral daily  clopidogrel Tablet 75 milliGRAM(s) Oral daily  dextrose 5%. 1000 milliLiter(s) (100 mL/Hr) IV Continuous <Continuous>  dextrose 5%. 1000 milliLiter(s) (50 mL/Hr) IV Continuous <Continuous>  dextrose 50% Injectable 25 Gram(s) IV Push once  dextrose 50% Injectable 12.5 Gram(s) IV Push once  dextrose 50% Injectable 25 Gram(s) IV Push once  enoxaparin Injectable 40 milliGRAM(s) SubCutaneous every 24 hours  finasteride 5 milliGRAM(s) Oral daily  gabapentin 800 milliGRAM(s) Oral two times a day  glucagon  Injectable 1 milliGRAM(s) IntraMuscular once  midodrine. 7.5 milliGRAM(s) Oral three times a day  polyethylene glycol 3350 17 Gram(s) Oral daily  senna 2 Tablet(s) Oral at bedtime  tamsulosin 0.4 milliGRAM(s) Oral at bedtime    MEDICATIONS  (PRN):  acetaminophen     Tablet .. 650 milliGRAM(s) Oral every 6 hours PRN Temp greater or equal to 38C (100.4F), Mild Pain (1 - 3)  dextrose Oral Gel 15 Gram(s) Oral once PRN Blood Glucose LESS THAN 70 milliGRAM(s)/deciliter    ALLERGIES:  Allergies    No Known Allergies    Intolerances    LABS:                        11.7   3.45  )-----------( 127      ( 15 Aug 2023 05:30 )             33.8     08-15    138  |  105  |  18  ----------------------------<  82  4.0   |  26  |  1.09    Ca    8.7      15 Aug 2023 05:30  Phos  2.4     08-15  Mg     1.8     08-15        Urinalysis Basic - ( 15 Aug 2023 05:30 )    Color: x / Appearance: x / SG: x / pH: x  Gluc: 82 mg/dL / Ketone: x  / Bili: x / Urobili: x   Blood: x / Protein: x / Nitrite: x   Leuk Esterase: x / RBC: x / WBC x   Sq Epi: x / Non Sq Epi: x / Bacteria: x      CAPILLARY BLOOD GLUCOSE          RADIOLOGY & ADDITIONAL TESTS: Reviewed.

## 2023-08-15 NOTE — DIETITIAN INITIAL EVALUATION ADULT - REASON FOR ADMISSION
"82M with PMH of TIA, DM2 with neuropathy, BPH and HLD presenting with worsening ataxia causing a fall.  S/p tenecteplase and monitored in the ICU, then stable for transfer to [facility] stroke and pending further workup. "

## 2023-08-15 NOTE — DIETITIAN INITIAL EVALUATION ADULT - ORAL INTAKE PTA/DIET HISTORY
Visited pt at bedside. Reports that 19 weeks ago pt had decreased appetite as a side effect of the his medication he was taking (Mounjaro). States that he does not follow any specific diet at home. No cultural, ethnic, Methodist food preferences noted. Confirms No known food allergies. States that his UBW was ~230 and had lost 18 pounds in the 19 weeks he had decreased appetite. Currently pt weighs 212 pounds (dosing). Indicates 7.8% weight loss x 4.75 months. Visited pt at bedside. Reports that 19 weeks ago pt had decreased appetite as a side effect of the his medication he was taking (Mounjaro). States that he does not follow any specific diet at home. No cultural, ethnic, Yazdanism food preferences noted. Confirms No known food allergies. States that his UBW was ~230 and had lost 18 pounds in the 19 weeks he had decreased appetite. Currently pt weighs 212 pounds (dosing). Indicates 7.8% weight loss x 4.75 months. Noted weight loss not clinically significant.

## 2023-08-15 NOTE — DIETITIAN INITIAL EVALUATION ADULT - ENTER FROM (CAL/KG)
[facility] pharmacy called to LM to request script for Amlodipine 5mg #90 to be sent to pharmacy  Patient was last seen in the office on 01/16/2019  Thank you 
Prescription approved
30

## 2023-08-16 LAB
ANION GAP SERPL CALC-SCNC: 8 MMOL/L — SIGNIFICANT CHANGE UP (ref 5–17)
BASOPHILS # BLD AUTO: 0.01 K/UL — SIGNIFICANT CHANGE UP (ref 0–0.2)
BASOPHILS NFR BLD AUTO: 0.3 % — SIGNIFICANT CHANGE UP (ref 0–2)
BUN SERPL-MCNC: 14 MG/DL — SIGNIFICANT CHANGE UP (ref 7–23)
CALCIUM SERPL-MCNC: 8.8 MG/DL — SIGNIFICANT CHANGE UP (ref 8.4–10.5)
CHLORIDE SERPL-SCNC: 106 MMOL/L — SIGNIFICANT CHANGE UP (ref 96–108)
CO2 SERPL-SCNC: 23 MMOL/L — SIGNIFICANT CHANGE UP (ref 22–31)
CREAT SERPL-MCNC: 0.95 MG/DL — SIGNIFICANT CHANGE UP (ref 0.5–1.3)
EGFR: 81 ML/MIN/1.73M2 — SIGNIFICANT CHANGE UP
EOSINOPHIL # BLD AUTO: 0.05 K/UL — SIGNIFICANT CHANGE UP (ref 0–0.5)
EOSINOPHIL NFR BLD AUTO: 1.7 % — SIGNIFICANT CHANGE UP (ref 0–6)
GLUCOSE SERPL-MCNC: 88 MG/DL — SIGNIFICANT CHANGE UP (ref 70–99)
HCT VFR BLD CALC: 34.4 % — LOW (ref 39–50)
HGB BLD-MCNC: 12 G/DL — LOW (ref 13–17)
IMM GRANULOCYTES NFR BLD AUTO: 0 % — SIGNIFICANT CHANGE UP (ref 0–0.9)
LYMPHOCYTES # BLD AUTO: 1.03 K/UL — SIGNIFICANT CHANGE UP (ref 1–3.3)
LYMPHOCYTES # BLD AUTO: 35.5 % — SIGNIFICANT CHANGE UP (ref 13–44)
MAGNESIUM SERPL-MCNC: 1.8 MG/DL — SIGNIFICANT CHANGE UP (ref 1.6–2.6)
MCHC RBC-ENTMCNC: 33.1 PG — SIGNIFICANT CHANGE UP (ref 27–34)
MCHC RBC-ENTMCNC: 34.9 GM/DL — SIGNIFICANT CHANGE UP (ref 32–36)
MCV RBC AUTO: 94.8 FL — SIGNIFICANT CHANGE UP (ref 80–100)
MONOCYTES # BLD AUTO: 0.29 K/UL — SIGNIFICANT CHANGE UP (ref 0–0.9)
MONOCYTES NFR BLD AUTO: 10 % — SIGNIFICANT CHANGE UP (ref 2–14)
NEUTROPHILS # BLD AUTO: 1.52 K/UL — LOW (ref 1.8–7.4)
NEUTROPHILS NFR BLD AUTO: 52.5 % — SIGNIFICANT CHANGE UP (ref 43–77)
NRBC # BLD: 0 /100 WBCS — SIGNIFICANT CHANGE UP (ref 0–0)
PHOSPHATE SERPL-MCNC: 2.2 MG/DL — LOW (ref 2.5–4.5)
PLATELET # BLD AUTO: 151 K/UL — SIGNIFICANT CHANGE UP (ref 150–400)
POTASSIUM SERPL-MCNC: 3.5 MMOL/L — SIGNIFICANT CHANGE UP (ref 3.5–5.3)
POTASSIUM SERPL-SCNC: 3.5 MMOL/L — SIGNIFICANT CHANGE UP (ref 3.5–5.3)
RBC # BLD: 3.63 M/UL — LOW (ref 4.2–5.8)
RBC # FLD: 12.7 % — SIGNIFICANT CHANGE UP (ref 10.3–14.5)
SODIUM SERPL-SCNC: 137 MMOL/L — SIGNIFICANT CHANGE UP (ref 135–145)
WBC # BLD: 2.9 K/UL — LOW (ref 3.8–10.5)
WBC # FLD AUTO: 2.9 K/UL — LOW (ref 3.8–10.5)

## 2023-08-16 PROCEDURE — 99232 SBSQ HOSP IP/OBS MODERATE 35: CPT

## 2023-08-16 PROCEDURE — 99233 SBSQ HOSP IP/OBS HIGH 50: CPT

## 2023-08-16 RX ORDER — POTASSIUM CHLORIDE 20 MEQ
40 PACKET (EA) ORAL ONCE
Refills: 0 | Status: COMPLETED | OUTPATIENT
Start: 2023-08-16 | End: 2023-08-16

## 2023-08-16 RX ORDER — MAGNESIUM OXIDE 400 MG ORAL TABLET 241.3 MG
400 TABLET ORAL ONCE
Refills: 0 | Status: COMPLETED | OUTPATIENT
Start: 2023-08-16 | End: 2023-08-16

## 2023-08-16 RX ADMIN — GABAPENTIN 800 MILLIGRAM(S): 400 CAPSULE ORAL at 05:29

## 2023-08-16 RX ADMIN — Medication 1000 UNIT(S): at 10:30

## 2023-08-16 RX ADMIN — FINASTERIDE 5 MILLIGRAM(S): 5 TABLET, FILM COATED ORAL at 10:29

## 2023-08-16 RX ADMIN — MIDODRINE HYDROCHLORIDE 7.5 MILLIGRAM(S): 2.5 TABLET ORAL at 16:17

## 2023-08-16 RX ADMIN — GABAPENTIN 800 MILLIGRAM(S): 400 CAPSULE ORAL at 17:46

## 2023-08-16 RX ADMIN — MIDODRINE HYDROCHLORIDE 7.5 MILLIGRAM(S): 2.5 TABLET ORAL at 05:29

## 2023-08-16 RX ADMIN — Medication 1 TABLET(S): at 10:30

## 2023-08-16 RX ADMIN — MIDODRINE HYDROCHLORIDE 7.5 MILLIGRAM(S): 2.5 TABLET ORAL at 12:06

## 2023-08-16 RX ADMIN — ENOXAPARIN SODIUM 40 MILLIGRAM(S): 100 INJECTION SUBCUTANEOUS at 12:06

## 2023-08-16 RX ADMIN — BUPROPION HYDROCHLORIDE 300 MILLIGRAM(S): 150 TABLET, EXTENDED RELEASE ORAL at 12:03

## 2023-08-16 RX ADMIN — Medication 40 MILLIEQUIVALENT(S): at 10:29

## 2023-08-16 RX ADMIN — Medication 81 MILLIGRAM(S): at 10:29

## 2023-08-16 RX ADMIN — CLOPIDOGREL BISULFATE 75 MILLIGRAM(S): 75 TABLET, FILM COATED ORAL at 10:29

## 2023-08-16 RX ADMIN — MAGNESIUM OXIDE 400 MG ORAL TABLET 400 MILLIGRAM(S): 241.3 TABLET ORAL at 10:29

## 2023-08-16 RX ADMIN — TAMSULOSIN HYDROCHLORIDE 0.4 MILLIGRAM(S): 0.4 CAPSULE ORAL at 21:14

## 2023-08-16 NOTE — PROGRESS NOTE ADULT - SUBJECTIVE AND OBJECTIVE BOX
Neurology Stroke Progress Note    INTERVAL HPI/OVERNIGHT EVENTS:  Patient seen and examined @ bedside. States he did not feel well this AM while in Bed, But later was able to work with PT. Orthostatic VS improved with standing BP @ 90s. Will continue midodrine @ 7.5mg PO TID. No other complaints.        MEDICATIONS  (STANDING):  aspirin enteric coated 81 milliGRAM(s) Oral daily  buPROPion XL (24-Hour) . 300 milliGRAM(s) Oral daily  calcium carbonate    500 mG (Tums) Chewable 1 Tablet(s) Chew daily  cholecalciferol 1000 Unit(s) Oral daily  clopidogrel Tablet 75 milliGRAM(s) Oral daily  enoxaparin Injectable 40 milliGRAM(s) SubCutaneous every 24 hours  finasteride 5 milliGRAM(s) Oral daily  gabapentin 800 milliGRAM(s) Oral two times a day  midodrine. 7.5 milliGRAM(s) Oral three times a day  polyethylene glycol 3350 17 Gram(s) Oral daily  senna 2 Tablet(s) Oral at bedtime  tamsulosin 0.4 milliGRAM(s) Oral at bedtime    MEDICATIONS  (PRN):  acetaminophen     Tablet .. 650 milliGRAM(s) Oral every 6 hours PRN Temp greater or equal to 38C (100.4F), Mild Pain (1 - 3)      Allergies  No Known Allergies      Vital Signs Last 24 Hrs  Vital Signs Last 24 Hrs  T(C): 36.2 (08-16-23 @ 19:44), Max: 37.3 (08-16-23 @ 09:10)  T(F): 97.1 (08-16-23 @ 19:44), Max: 99.2 (08-16-23 @ 09:10)  HR: 56 (08-16-23 @ 16:14) (50 - 72)  BP: 131/75 (08-16-23 @ 16:14) (110/63 - 137/71)  BP(mean): 98 (08-16-23 @ 16:14) (81 - 99)  RR: 20 (08-16-23 @ 16:14) (17 - 22)  SpO2: 97% (08-16-23 @ 16:14) (96% - 99%)    Orthostatic VS  08-16-23 @ 12:01  Lying BP: 130/66 HR: --  Sitting BP: 111/63 HR: --  Standing BP: 92/57 HR: --  Site: --  Mode: --  Orthostatic VS  08-15-23 @ 13:40  Lying BP: 139/67 HR: 50  Sitting BP: 106/57 HR: 50  Standing BP: --/-- HR: --  Site: upper left arm  Mode: --    Orthostatic VS  08-13-23 @ 14:29  Lying BP: 130/64 HR: --  Sitting BP: 104/58 HR: --  Standing BP: 90/51 HR: --  Site: upper right arm  Mode: electronic  Orthostatic VS  08-12-23 @ 16:01  Lying BP: 147/63 HR: 68  Sitting BP: --/-- HR: --  Standing BP: 125/60 HR: 101  Site: --  Mode: --    Parameters below as of 12 Aug 2023 12:00  Patient On (Oxygen Delivery Method): room air        Physical exam:  General: No acute distress, awake and alert  Eyes: Anicteric sclerae, moist conjunctivae, see below for CNs  Neck: trachea midline, FROM, supple, no thyromegaly or lymphadenopathy  Cardiovascular: Regular rate and rhythm on the monitor.  Pulmonary: . No use of accessory muscles  GI: Abdomen soft, non-distended, non-tender  Extremities: Radial and DP pulses +2, RUE hematoma from fall PTA +, w/ Dsg C/D/I. No active bleeding @ this point of time. B/L LE swelling and TTP+ with discolouration B/L shin L>R+. Chronic B/L Shin pain.    Neurologic:  -Mental status: Awake, alert, oriented to person, place, and time. Speech is fluent with intact naming, repetition, and comprehension, no dysarthria. Recent and remote memory intact. Follows commands. Attention/concentration intact.     -Cranial nerves:   II: Visual fields are full to confrontation.  III, IV, VI: Extraocular movements are intact without nystagmus. Pupils equally round and reactive to light.  V:  Facial sensation V1-V3 equal and intact   VII: Face is symmetric with normal eye closure and smile  VIII: Hearing is bilaterally intact to finger rub  IX, X: Uvula is midline and soft palate rises symmetrically  XI: Head turning and shoulder shrug are intact.  XII: Tongue protrudes midline  Motor: Normal bulk and tone. No pronator drift. Strength bilateral upper extremity 5/5, bilateral lower extremities 3/5 , C/O difficulty and pain while attempting to lift B/L LE and TTP + when palpating B/L shins.  Sensation: chronic bilateral ankle numbness. No neglect or extinction on double simultaneous testing.  Coordination: No dysmetria on finger-to-nose bilaterally  Gait: unsteady    LABS:                                         12.0   2.90  )-----------( 151      ( 16 Aug 2023 05:30 )             34.4     08-16    137  |  106  |  14  ----------------------------<  88  3.5   |  23  |  0.95    Ca    8.8      16 Aug 2023 05:30  Phos  2.2     08-16  Mg     1.8     08-16        RADIOLOGY & ADDITIONAL TESTS:    CT Brain Stroke Protocol (08.11.23 @ 18:07)   IMPRESSION:    No acute intracranial hemorrhage or acute territorial infarct.    CT Angio Brain/Neck Stroke Protocol  w/ IV Cont (08.11.23 @ 18:13)   IMPRESSION:    No interval change from 5/20/23. No intracranial or extracranial arterial   steno-occlusive disease.    CT Brain Perfusion Maps Stroke (08.11.23 @ 18:10)     IMPRESSION:  Negative CT perfusion of the brain.

## 2023-08-16 NOTE — PROGRESS NOTE ADULT - ASSESSMENT
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  S/p tenecteplase and monitored in the ICU, then stable for transfer to tele stroke and pending further workup.     #CVA workup  - received TNK, follow up scan is stable  - pending MRI  - TTE with mild aortic root dilatation - recommend outpatient follow up and repeat echo  - continue dapt  - continue midodrine for orthostatic hypotension  - rest of plan per stroke team     #fevers  -no leukocytosis, no tachycardia  -UA neg, CXR clear, RVP/covid neg  -no obvious infectious symptoms - says rhinorrhea and cough are chronic  - negative workup. No further fevrs.     #BPH  - continue flomax and finasteride    #DM  #diabetic neuropathy  - on mounjaro and metformin  - A1C is 5  - resume outpatient regimen on discahrge  - continue gabapentin at home dose for neuropathy     #thrombocytopenia  152 on admission, dropped but continues to improve  - does have downtrending WBC count - possibly related to ?viral infection that may have caused the fever  - no previous exposure to heparin  - continue DAPT  - if worsens, can consider a splenic ultrasound, however will hold on this additional test for now.   - recommend repeat labs in one week with primary care doctor     #normocytic anemia  - stable    50 minutes spent on this encounter, including face to face with patient, care coordination and documentation. Plan of care discussed with stroke team.

## 2023-08-16 NOTE — PROGRESS NOTE ADULT - SUBJECTIVE AND OBJECTIVE BOX
Felt a little unwell today.  Blood pressure still drops when he gets up.     ***Note in progress***    OVERNIGHT EVENTS: NAEO    SUBJECTIVE / INTERVAL HPI: Patient seen and examined at bedside. Patient denying chest pain, SOB, palpitations, cough. Patient denies fever, chills, HA, Dizziness, change in vision/hearing, N/V, abdominal pain, diarrhea, constipation, hematochezia/melena, dysuria, hematuria, new onset weakness/numbness, LE pain and/or swelling.    Remaining ROS negative       PHYSICAL EXAM:    General: WDWN  HEENT: NC/AT; PERRL, anicteric sclera; MMM  Neck: supple  Cardiovascular: +S1/S2, RRR  Respiratory: CTA B/L; no W/R/R  Gastrointestinal: soft, NT/ND; +BSx4  Extremities: WWP; no edema, clubbing or cyanosis  Vascular: 2+ radial, DP/PT pulses B/L  Neurological: AAOx3; no focal deficits  Psychiatric: pleasant mood and affect  Dermatologic: no appreciable wounds or damage to the skin    VITAL SIGNS:  Vital Signs Last 24 Hrs  T(C): 36.2 (16 Aug 2023 19:44), Max: 37.3 (16 Aug 2023 09:10)  T(F): 97.1 (16 Aug 2023 19:44), Max: 99.2 (16 Aug 2023 09:10)  HR: 65 (16 Aug 2023 20:15) (50 - 72)  BP: 122/62 (16 Aug 2023 20:15) (110/63 - 137/71)  BP(mean): 89 (16 Aug 2023 20:15) (81 - 99)  RR: 20 (16 Aug 2023 20:15) (17 - 22)  SpO2: 96% (16 Aug 2023 20:15) (96% - 99%)    Parameters below as of 16 Aug 2023 20:15  Patient On (Oxygen Delivery Method): room air          MEDICATIONS:  MEDICATIONS  (STANDING):  aspirin enteric coated 81 milliGRAM(s) Oral daily  buPROPion XL (24-Hour) . 300 milliGRAM(s) Oral daily  calcium carbonate    500 mG (Tums) Chewable 1 Tablet(s) Chew daily  cholecalciferol 1000 Unit(s) Oral daily  clopidogrel Tablet 75 milliGRAM(s) Oral daily  enoxaparin Injectable 40 milliGRAM(s) SubCutaneous every 24 hours  finasteride 5 milliGRAM(s) Oral daily  gabapentin 800 milliGRAM(s) Oral two times a day  midodrine. 7.5 milliGRAM(s) Oral three times a day  polyethylene glycol 3350 17 Gram(s) Oral daily  senna 2 Tablet(s) Oral at bedtime  tamsulosin 0.4 milliGRAM(s) Oral at bedtime    MEDICATIONS  (PRN):  acetaminophen     Tablet .. 650 milliGRAM(s) Oral every 6 hours PRN Temp greater or equal to 38C (100.4F), Mild Pain (1 - 3)      ALLERGIES:  Allergies    No Known Allergies    Intolerances        LABS:                        12.0   2.90  )-----------( 151      ( 16 Aug 2023 05:30 )             34.4     08-16    137  |  106  |  14  ----------------------------<  88  3.5   |  23  |  0.95    Ca    8.8      16 Aug 2023 05:30  Phos  2.2     08-16  Mg     1.8     08-16        Urinalysis Basic - ( 16 Aug 2023 05:30 )    Color: x / Appearance: x / SG: x / pH: x  Gluc: 88 mg/dL / Ketone: x  / Bili: x / Urobili: x   Blood: x / Protein: x / Nitrite: x   Leuk Esterase: x / RBC: x / WBC x   Sq Epi: x / Non Sq Epi: x / Bacteria: x      CAPILLARY BLOOD GLUCOSE          RADIOLOGY & ADDITIONAL TESTS: Reviewed.

## 2023-08-16 NOTE — PROGRESS NOTE ADULT - ASSESSMENT
Neurology    82 y o Male with PMHx of TIA (dx 2 years ago, did not feel any symptoms, reports his PCP does not think he had a TIA but his neurologist did so he takes asa/plavix daily), BPH, DM, diabetic neuropathy, HLD presents to ED for gait ataxia starting 1530 today after having lunch. Walks with a cane at baseline and his gait was worse. Fell at 430pm, no LOC. Reports has chronic bilateral LE weakness and chronic dizziness. CT imaging unremarkable. Case discussed with Dr. Agustin prior to urgent intervention. Risk and benefits of tenecteplase were discussed with patient member including risk of symptomatic ICH/death. Decision was made that benefits outweighed risks and tenecteplase was administered at 1830 on 8/11, stability CTH on 8/12 w/ no bleed. Now pending MRI and echo.     Neuro  #CVA workup  - platelet count steadily improving, continue with aspirin 81mg and plavix 75mg qd   - continue atorvastatin 80mg daily  - q4hr stroke neuro checks and vitals  - f/u MRI Brain without contrast  - Stroke Code HCT Results: negative  - Stroke Code CTA Results: Small amount of calcified plaque at the cavernous segments of the bilateral internal carotid arteries. The anterior and middle cerebral arteries are patent at their 1st and 2nd order segments, and appear symmetric caliber. The posterior circulation shows no high grade stenosis or occlusion. Mildly hypoplastic right P1 segment with a prominent posterior communicating artery. Trace amount of calcified plaque in the right V4 segment. 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  - Stroke education    Cards  #HTN  - Gradual Normotension.  - Symptomatic Orthostatic Hypotension  - increased midodrine to 7.5mg TID, if patient has SBP >180 while supine, we will need to sit patient up and recheck the BP  - TTE showing mild dilation of the aortic root     #HLD  - high dose statin as above in CVA  - LDL results: 76    Pulm  - call provider if SPO2 < 94%    GI  #Nutrition/Fluids/Electrolytes   - replete K<4 and Mg <2  - Diet: CCD    Renal  - daily BMP    ID  #fevers  Pt has been having fevers for 2 days, complains of rhinorrhea and cough for several days. BCx pending, CXR and U/A negative; RVP negative. Possible source RUE hematoma, as area appears more swollen with warmth on 8/13, appears stable today.  - BCx: NGTD  - B/L LE and RUE  dopplers : Negative.    HEME  #Thrombocytopenia  Plt decreasing since admission, down to 118 8/13 AM, possibly 2/2 large forming hematoma in RUE, now stable with no active bleeding   - Trend CBC, stable Hb  - c/w DAPT    Endocrine  #DM  - A1C results: 5%  - ac/hs fingersticks  - Home regimen Metformin BID and Mounjaro inJ once a week X 19 weeks, held while inpt.    - TSH results: 1.170    DVT Prophylaxis  - SQL for DVT PPx.  - SCDs for DVT prophylaxis

## 2023-08-16 NOTE — PROGRESS NOTE ADULT - ASSESSMENT
Assessment and Plan:     Assessment	  82y Male with PMHx of TIA (dx 2 years ago, did not feel any symptoms, reports his PCP does not think he had a TIA but his neurologist did so he takes asa/plavix daily), BPH, DM, diabetic neuropathy, HLD presents to ED for gait ataxia starting 1530 today after having lunch. Walks with a cane at baseline and his gait was worse. Fell at 430pm, no LOC. Reports has chronic bilateral LE weakness and chronic dizziness. CT imaging unremarkable. Case discussed with Dr. Agustin prior to urgent intervention. Risk and benefits of tenecteplase were discussed with patient member including risk of symptomatic ICH/death. Decision was made that benefits outweighed risks and tenecteplase was administered at 1830 on 8/11, stability CTH on 8/12 w/ no bleed. MRI negative for acute stroke. Pt still symptomatic w/ dizziness with position changes and symptoms possibly 2/2 orthostatic hypotension.    Neuro  #CVA workup  - C/W DAPT, stable platelets.  - continue atorvastatin 80mg daily  - q4hr stroke neuro checks and vitals  - MRI Brain without contrast : Negative  - Stroke Code HCT Results: negative  - Stroke Code CTA Results: Small amount of calcified plaque at the cavernous segments of the bilateral internal carotid arteries. The anterior and middle cerebral arteries are patent at their 1st and 2nd order segments, and appear symmetric caliber. The posterior circulation shows no high grade stenosis or occlusion. Mildly hypoplastic right P1 segment with a prominent posterior communicating artery. Trace amount of calcified plaque in the right V4 segment. 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  - Stroke education    Cards  #HTN  - Gradual Normotension.  - Symptomatic Orthostatic Hypotension  - started on Midodrine 5mg TID, Hold for SBP > 180, increased to 7.5mg TID.  - TTE : Mildly dilated aortic root(needs outpt PCP F/U).    #HLD  - high dose statin as above in CVA  - LDL results: 76    Pulm  - call provider if SPO2 < 94%    GI  #Nutrition/Fluids/Electrolytes   - replete K<4 and Mg <2  - Diet: CCD    Renal  - daily BMP    ID  #fevers  Pt has been having fevers for 2 days, complains of rhinorrhea and cough for several days. BCx pending, CXR and U/A negative; RVP negative. Possible source RUE hematoma, as area appears more swollen with warmth on 8/13, appears stable today.  - BCx: NGTD  - B/L LE and RUE  dopplers : Negative.    HEME  #Thrombocytopenia  Plt decreasing since admission, down to 118 8/13 AM, possibly 2/2 large forming hematoma in RUE, now stable with no active bleeding and Plts on 8/14 @122  - Trend CBC, stable Hb  - Restarted DAPT.    Endocrine  #DM  - A1C results: 5%  - ac/hs fingersticks  - Home regimen Metformin BID and Mounjaro inJ once a week X 19 weeks, held while inpt.    - TSH results: 1.170    DVT Prophylaxis  - SQL for DVT PPx.  - SCDs for DVT prophylaxis       Dispo: AR vs home PT, pending Re eval by PT.      Discussed daily hospital plans and goals with patient.    Discussed with Neurology Attending Dr. Fields and Stroke fellow Dr. Lew during rounds.

## 2023-08-16 NOTE — PROGRESS NOTE ADULT - SUBJECTIVE AND OBJECTIVE BOX
Physical Medicine and Rehabilitation Progress Note :       Patient is a 82y old  Male who presents with a chief complaint of "82M with PMH of TIA, DM2 with neuropathy, BPH and HLD presenting with worsening ataxia causing a fall.  S/p tenecteplase and monitored in the ICU, then stable for transfer to [facility] stroke and pending further workup. " (15 Aug 2023 11:16)      HPI:  82y Male with PMHx of TIA (dx 2 years ago, did not feel any symptoms, reports his PCP does not think he had a TIA but his neurologist did so he takes asa/plavix daily), BPH, type 1 DM, diabetic neuropathy, HLD presents to ED for gait ataxia starting 1530 (8/11) after having lunch. Walks with a cane at baseline and his gait was worse. Fell at 430pm, no LOC. Reports has chronic bilateral LE weakness and chronic dizziness. CT imaging unremarkable. Decision was made that benefits outweighed risks and tenecteplase was administered at 1830.      (11 Aug 2023 20:25)                            12.0   2.90  )-----------( 151      ( 16 Aug 2023 05:30 )             34.4       08-16    137  |  106  |  14  ----------------------------<  88  3.5   |  23  |  0.95    Ca    8.8      16 Aug 2023 05:30  Phos  2.2     08-16  Mg     1.8     08-16      Vital Signs Last 24 Hrs  T(C): 37.3 (16 Aug 2023 09:10), Max: 37.3 (16 Aug 2023 09:10)  T(F): 99.2 (16 Aug 2023 09:10), Max: 99.2 (16 Aug 2023 09:10)  HR: 72 (16 Aug 2023 08:50) (50 - 85)  BP: 110/63 (16 Aug 2023 08:50) (110/63 - 144/74)  BP(mean): 81 (16 Aug 2023 08:50) (81 - 101)  RR: 20 (16 Aug 2023 08:50) (17 - 20)  SpO2: 98% (16 Aug 2023 08:50) (95% - 99%)    Parameters below as of 16 Aug 2023 08:50  Patient On (Oxygen Delivery Method): room air        MEDICATIONS  (STANDING):  aspirin enteric coated 81 milliGRAM(s) Oral daily  buPROPion XL (24-Hour) . 300 milliGRAM(s) Oral daily  calcium carbonate    500 mG (Tums) Chewable 1 Tablet(s) Chew daily  cholecalciferol 1000 Unit(s) Oral daily  clopidogrel Tablet 75 milliGRAM(s) Oral daily  enoxaparin Injectable 40 milliGRAM(s) SubCutaneous every 24 hours  finasteride 5 milliGRAM(s) Oral daily  gabapentin 800 milliGRAM(s) Oral two times a day  midodrine. 7.5 milliGRAM(s) Oral three times a day  polyethylene glycol 3350 17 Gram(s) Oral daily  senna 2 Tablet(s) Oral at bedtime  tamsulosin 0.4 milliGRAM(s) Oral at bedtime    MEDICATIONS  (PRN):  acetaminophen     Tablet .. 650 milliGRAM(s) Oral every 6 hours PRN Temp greater or equal to 38C (100.4F), Mild Pain (1 - 3)        Functional Status Assessment :   8/15/2023    Pain Assessment/Number Scale (0-10) Adult  Presence of Pain: denies pain/discomfort (Rating = 0)  Pain Rating (0-10): Rest: 0 (no pain/absence of nonverbal indicators of pain)  Pain Rating (0-10): Activity: 0 (no pain/absence of nonverbal indicators of pain)    Cognitive/Neuro      Cognitive/Neuro/Behavioral  Cognitive/Neuro/Behavioral [WDL Definition: Alert; opens eyes spontaneously; arouses to voice or touch; oriented x 4; follows commands; speech spontaneous, logical; purposeful motor response; behavior appropriate to situation]: WDL    Language Assistance  Preferred Language to Address Healthcare Preferred Language to Address Healthcare: English    Therapeutic Interventions      Bed Mobility  Bed Mobility Training Rolling/Turning: contact guard  Bed Mobility Training Scooting: contact guard  Bed Mobility Training Sit-to-Supine: contact guard  Bed Mobility Training Supine-to-Sit: contact guard  Bed Mobility Training Limitations: dangle with supervision, c/o feeling lightheaded, orthostatic hypotension, see vital signs     Sit-Stand Transfer Training  Transfer Training Sit-to-Stand Transfer: contact guard;  verbal cues;  nonverbal cues (demo/gestures)  Transfer Training Stand-to-Sit Transfer: contact guard;  verbal cues;  nonverbal cues (demo/gestures)  Sit-to-Stand Transfer Training Transfer Safety Analysis: slightly unsteady, no loss of balance, c/o increased lightheadedness, unable to take standing BP     Therapeutic Exercise  Therapeutic Exercise Detail: bilateral UE and LE 4+/5         AM-PAC Functional Assessment: Daily Activity  Type of Assessment: Daily assessment  Putting on and taking off regular lower body clothing?: 3 = A little assistance  Bathing (including washing, rinsing, drying)?: 3 = A little assistance  Toileting, which includes using toilet, bedpan or urinal?: 3 = A little assistance  Putting on and taking off regular upper body clothing?: 3 = A little assistance  Take care of personal grooming such as brushing teeth?: 4 = No assist / stand by assistance  Eating meals?: 4 = No assist / stand by assistance  Score: 20   Row Comment: Ask the patient "How much help from another person do you currently need? (If the patient hasn't done an activity recently, how much help from another person do you think he/she needs if he/she tried?)    Cognitive/Neuro      Cognitive/Neuro/Behavioral  Cognitive/Neuro/Behavioral [WDL Definition: Alert; opens eyes spontaneously; arouses to voice or touch; oriented x 4; follows commands; speech spontaneous, logical; purposeful motor response; behavior appropriate to situation]: WDL    Language Assistance  Preferred Language to Address Healthcare Preferred Language to Address Healthcare: English    Therapeutic Interventions      Bed Mobility  Bed Mobility Training Symptoms Noted During/After Treatment: fatigue  Bed Mobility Training Rolling/Turning: contact guard;  1 person assist;  verbal cues  Bed Mobility Training Scooting: contact guard;  1 person assist;  verbal cues  Bed Mobility Training Sit-to-Supine: contact guard;  1 person assist;  verbal cues  Bed Mobility Training Supine-to-Sit: supervsion;  supervision  Bed Mobility Training Limitations: decreased strength;  impaired coordination    Sit-Stand Transfer Training  Transfer Training Sit-to-Stand Transfer: 1 person assist;  verbal cues;  minimum assist (75% patient effort);  HHA  Transfer Training Stand-to-Sit Transfer: minimum assist (75% patient effort);  1 person assist;  verbal cues;  HHA  Sit-to-Stand Transfer Training Transfer Safety Analysis: decreased balance;  decreased sequencing ability;  decreased strength;  impaired balance;  impaired coordination;  HHA    Therapeutic Exercise  Therapeutic Exercise Charges: Pt took ~3-4 steps EOB w/ MIn A, HHA/support - +c/o severedizziness and w/ unssteady balance   Therapeutic Exercise Detail: EOB sitting/dangling of LEs ~10 mins (CS/S)     Lower Body Dressing Training  Lower Body Dressing Training Assistance: minimum assist (75% patient effort);  1 person assist;  verbal cues;  decreased strength    Upper Body Dressing Training  Upper Body Dressing Training Assistance: minimum assist (75% patient effort);  1 person assist;  verbal cues;  impaired coordination;  impaired motor control;  impaired balance;  decreased strength;  decreased flexibility              PM&R Impression : as above    Current Disposition Plan Recommendations :    pending functional progress

## 2023-08-17 LAB
ANION GAP SERPL CALC-SCNC: 10 MMOL/L — SIGNIFICANT CHANGE UP (ref 5–17)
BUN SERPL-MCNC: 14 MG/DL — SIGNIFICANT CHANGE UP (ref 7–23)
CALCIUM SERPL-MCNC: 9 MG/DL — SIGNIFICANT CHANGE UP (ref 8.4–10.5)
CHLORIDE SERPL-SCNC: 107 MMOL/L — SIGNIFICANT CHANGE UP (ref 96–108)
CO2 SERPL-SCNC: 22 MMOL/L — SIGNIFICANT CHANGE UP (ref 22–31)
CREAT SERPL-MCNC: 1.04 MG/DL — SIGNIFICANT CHANGE UP (ref 0.5–1.3)
EGFR: 73 ML/MIN/1.73M2 — SIGNIFICANT CHANGE UP
GLUCOSE SERPL-MCNC: 80 MG/DL — SIGNIFICANT CHANGE UP (ref 70–99)
HCT VFR BLD CALC: 33.5 % — LOW (ref 39–50)
HGB BLD-MCNC: 11.7 G/DL — LOW (ref 13–17)
MAGNESIUM SERPL-MCNC: 2 MG/DL — SIGNIFICANT CHANGE UP (ref 1.6–2.6)
MCHC RBC-ENTMCNC: 33.5 PG — SIGNIFICANT CHANGE UP (ref 27–34)
MCHC RBC-ENTMCNC: 34.9 GM/DL — SIGNIFICANT CHANGE UP (ref 32–36)
MCV RBC AUTO: 96 FL — SIGNIFICANT CHANGE UP (ref 80–100)
NRBC # BLD: 0 /100 WBCS — SIGNIFICANT CHANGE UP (ref 0–0)
PHOSPHATE SERPL-MCNC: 2.2 MG/DL — LOW (ref 2.5–4.5)
PLATELET # BLD AUTO: 161 K/UL — SIGNIFICANT CHANGE UP (ref 150–400)
POTASSIUM SERPL-MCNC: 4.5 MMOL/L — SIGNIFICANT CHANGE UP (ref 3.5–5.3)
POTASSIUM SERPL-SCNC: 4.5 MMOL/L — SIGNIFICANT CHANGE UP (ref 3.5–5.3)
RBC # BLD: 3.49 M/UL — LOW (ref 4.2–5.8)
RBC # FLD: 13.1 % — SIGNIFICANT CHANGE UP (ref 10.3–14.5)
SODIUM SERPL-SCNC: 139 MMOL/L — SIGNIFICANT CHANGE UP (ref 135–145)
WBC # BLD: 3.09 K/UL — LOW (ref 3.8–10.5)
WBC # FLD AUTO: 3.09 K/UL — LOW (ref 3.8–10.5)

## 2023-08-17 PROCEDURE — 99233 SBSQ HOSP IP/OBS HIGH 50: CPT

## 2023-08-17 RX ADMIN — MIDODRINE HYDROCHLORIDE 7.5 MILLIGRAM(S): 2.5 TABLET ORAL at 06:38

## 2023-08-17 RX ADMIN — FINASTERIDE 5 MILLIGRAM(S): 5 TABLET, FILM COATED ORAL at 10:11

## 2023-08-17 RX ADMIN — BUPROPION HYDROCHLORIDE 300 MILLIGRAM(S): 150 TABLET, EXTENDED RELEASE ORAL at 10:10

## 2023-08-17 RX ADMIN — MIDODRINE HYDROCHLORIDE 7.5 MILLIGRAM(S): 2.5 TABLET ORAL at 18:32

## 2023-08-17 RX ADMIN — TAMSULOSIN HYDROCHLORIDE 0.4 MILLIGRAM(S): 0.4 CAPSULE ORAL at 22:26

## 2023-08-17 RX ADMIN — GABAPENTIN 800 MILLIGRAM(S): 400 CAPSULE ORAL at 18:32

## 2023-08-17 RX ADMIN — MIDODRINE HYDROCHLORIDE 7.5 MILLIGRAM(S): 2.5 TABLET ORAL at 12:00

## 2023-08-17 RX ADMIN — ENOXAPARIN SODIUM 40 MILLIGRAM(S): 100 INJECTION SUBCUTANEOUS at 12:00

## 2023-08-17 RX ADMIN — GABAPENTIN 800 MILLIGRAM(S): 400 CAPSULE ORAL at 06:37

## 2023-08-17 RX ADMIN — CLOPIDOGREL BISULFATE 75 MILLIGRAM(S): 75 TABLET, FILM COATED ORAL at 10:11

## 2023-08-17 RX ADMIN — Medication 1 TABLET(S): at 10:10

## 2023-08-17 RX ADMIN — Medication 81 MILLIGRAM(S): at 10:11

## 2023-08-17 RX ADMIN — Medication 1000 UNIT(S): at 10:11

## 2023-08-17 NOTE — PROGRESS NOTE ADULT - NUTRITIONAL ASSESSMENT
This patient has been assessed with a concern for Malnutrition and has been determined to have a diagnosis/diagnoses of Severe protein-calorie malnutrition.    This patient is being managed with:   Diet Consistent Carbohydrate/No Snacks-  DASH/TLC {Sodium & Cholesterol Restricted} (DASH)  Entered: Aug 12 2023  5:14AM

## 2023-08-17 NOTE — PROGRESS NOTE ADULT - SUBJECTIVE AND OBJECTIVE BOX
----------------TRANSFER FROM STROKE TELE TO [facility]----------------    HOSPITAL COURSE: 82y Male with PMHx of TIA (dx 2 years ago, did not feel any symptoms, reports his PCP does not think he had a TIA but his neurologist did so he takes plavix daily), BPH, DM, diabetic neuropathy, HLD presents to ED for gait ataxia started suddenly after having lunch. Walks with a cane at baseline and his gait was worse. Fell +HS, no LOC. Reports has chronic bilateral LE weakness and chronic dizziness. CT imaging unremarkable. Tenecteplase was administered on 8/11, stability CTH on 8/12 w/ no bleed. MRI negative for acute stroke. Pt still symptomatic w/ dizziness with position changes and symptoms possibly 2/2 orthostatic hypotension. Started on midodrine 7.5mg TID with good effect. Patient still having some dizziness while working with PT/OT. Will require 30 day cardiac monitoring when outpatient. Will continue with plavix monotherapy for now as presenting symptoms likely related to orthostasis rather than stroke and patient has not tolerated aspirin in the past d/t GI bleeding.       OVERNIGHT EVENTS:     SUBJECTIVE:    VITAL SIGNS:  Vital Signs Last 24 Hrs  T(C): 37.1 (17 Aug 2023 19:25), Max: 37.1 (17 Aug 2023 19:25)  T(F): 98.7 (17 Aug 2023 19:25), Max: 98.7 (17 Aug 2023 19:25)  HR: 58 (17 Aug 2023 19:25) (52 - 78)  BP: 147/75 (17 Aug 2023 19:25) (103/59 - 147/75)  BP(mean): 94 (17 Aug 2023 18:34) (75 - 98)  RR: 18 (17 Aug 2023 19:25) (17 - 21)  SpO2: 97% (17 Aug 2023 19:25) (97% - 98%)    Parameters below as of 17 Aug 2023 19:25  Patient On (Oxygen Delivery Method): room air      Physical exam:  General: No acute distress, awake and alert  Eyes: Anicteric sclerae, moist conjunctivae, see below for CNs  Neck: trachea midline, FROM, supple, no thyromegaly or lymphadenopathy  Cardiovascular: Regular rate and rhythm on the monitor.  Pulmonary: . No use of accessory muscles  GI: Abdomen soft, non-distended, non-tender  Extremities: Radial and DP pulses +2, RUE hematoma from fall PTA +, w/ Dsg C/D/I. No active bleeding @ this point of time. B/L LE swelling and TTP+ with discolouration B/L shin L>R+. Chronic B/L Shin pain.    MEDICATIONS:  MEDICATIONS  (STANDING):  buPROPion XL (24-Hour) . 300 milliGRAM(s) Oral daily  calcium carbonate    500 mG (Tums) Chewable 1 Tablet(s) Chew daily  cholecalciferol 1000 Unit(s) Oral daily  clopidogrel Tablet 75 milliGRAM(s) Oral daily  enoxaparin Injectable 40 milliGRAM(s) SubCutaneous every 24 hours  finasteride 5 milliGRAM(s) Oral daily  gabapentin 800 milliGRAM(s) Oral two times a day  midodrine. 7.5 milliGRAM(s) Oral three times a day  polyethylene glycol 3350 17 Gram(s) Oral daily  senna 2 Tablet(s) Oral at bedtime  tamsulosin 0.4 milliGRAM(s) Oral at bedtime    MEDICATIONS  (PRN):  acetaminophen     Tablet .. 650 milliGRAM(s) Oral every 6 hours PRN Temp greater or equal to 38C (100.4F), Mild Pain (1 - 3)      ALLERGIES:  No Known Allergies      LABS:                        11.7   3.09  )-----------( 161      ( 17 Aug 2023 05:30 )             33.5     08-17    139  |  107  |  14  ----------------------------<  80  4.5   |  22  |  1.04    Ca    9.0      17 Aug 2023 05:30  Phos  2.2     08-17  Mg     2.0     08-17          RADIOLOGY & ADDITIONAL TESTS: Reviewed. ----------------TRANSFER FROM STROKE TELE TO [facility]----------------    HOSPITAL COURSE: 82y Male with PMHx of TIA (dx 2 years ago, did not feel any symptoms, reports his PCP does not think he had a TIA but his neurologist did so he takes plavix daily), BPH, DM, diabetic neuropathy, HLD presents to ED for gait ataxia started suddenly after having lunch. Walks with a cane at baseline and his gait was worse. Fell +HS, no LOC. Reports has chronic bilateral LE weakness and chronic dizziness. CT imaging unremarkable. Tenecteplase was administered on 8/11, stability CTH on 8/12 w/ no bleed. MRI negative for acute stroke. Pt still symptomatic w/ dizziness with position changes and symptoms possibly 2/2 orthostatic hypotension. Started on midodrine 7.5mg TID with good effect. Patient still having some dizziness while working with PT/OT. Will require 30 day cardiac monitoring when outpatient. Will continue with plavix monotherapy for now as presenting symptoms likely related to orthostasis rather than stroke and patient has not tolerated aspirin in the past d/t GI bleeding.     INTERVAL HX/SUBJECTIVE:  At bedside pt is amiable. Only complaints are of: leg weakness, difficulty swallowing (especially when he eats dry things like bread, he may have to go to the bathroom to choke/vomit it up), dizziness which he says is minimal-to-none when lying supine in bed but worsens when sitting up or moving around. Pt complains of worsening memory for the past several years, and word-finding difficulty which he presumes is due to aging. Pt complains of chronic intermittent diarrhea, that he says comes and goes in bouts. He reports last time he had a bout of diarrhea was 4-5 wks ago. Pt denies headache, denies abdominal pain, denies abnormal urinary symptoms. Pt electively reports that he is amenable for MIKHAIL and even has his preferred MIKHAIL picked out.       VITAL SIGNS:  Vital Signs Last 24 Hrs  T(C): 37.1 (17 Aug 2023 19:25), Max: 37.1 (17 Aug 2023 19:25)  T(F): 98.7 (17 Aug 2023 19:25), Max: 98.7 (17 Aug 2023 19:25)  HR: 58 (17 Aug 2023 19:25) (52 - 78)  BP: 147/75 (17 Aug 2023 19:25) (103/59 - 147/75)  BP(mean): 94 (17 Aug 2023 18:34) (75 - 98)  RR: 18 (17 Aug 2023 19:25) (17 - 21)  SpO2: 97% (17 Aug 2023 19:25) (97% - 98%)    Parameters below as of 17 Aug 2023 19:25  Patient On (Oxygen Delivery Method): room air      Physical exam:  General: No acute distress, awake and alert  Eyes: Anicteric sclerae, moist conjunctivae, see below for CNs  Neck: trachea midline, FROM, supple, no thyromegaly or lymphadenopathy  Cardiovascular: Regular rate and rhythm on the monitor.  Pulmonary: . No use of accessory muscles  GI: Abdomen soft, non-distended, non-tender  Extremities: Radial and DP pulses +2, RUE hematoma from fall PTA +, w/ Dsg C/D/I. No active bleeding @ this point of time. B/L LE swelling and TTP+ with discolouration B/L shin L>R+. Chronic B/L Shin pain.    MEDICATIONS:  MEDICATIONS  (STANDING):  buPROPion XL (24-Hour) . 300 milliGRAM(s) Oral daily  calcium carbonate    500 mG (Tums) Chewable 1 Tablet(s) Chew daily  cholecalciferol 1000 Unit(s) Oral daily  clopidogrel Tablet 75 milliGRAM(s) Oral daily  enoxaparin Injectable 40 milliGRAM(s) SubCutaneous every 24 hours  finasteride 5 milliGRAM(s) Oral daily  gabapentin 800 milliGRAM(s) Oral two times a day  midodrine. 7.5 milliGRAM(s) Oral three times a day  polyethylene glycol 3350 17 Gram(s) Oral daily  senna 2 Tablet(s) Oral at bedtime  tamsulosin 0.4 milliGRAM(s) Oral at bedtime    MEDICATIONS  (PRN):  acetaminophen     Tablet .. 650 milliGRAM(s) Oral every 6 hours PRN Temp greater or equal to 38C (100.4F), Mild Pain (1 - 3)      ALLERGIES:  No Known Allergies      LABS:                        11.7   3.09  )-----------( 161      ( 17 Aug 2023 05:30 )             33.5     08-17    139  |  107  |  14  ----------------------------<  80  4.5   |  22  |  1.04    Ca    9.0      17 Aug 2023 05:30  Phos  2.2     08-17  Mg     2.0     08-17          RADIOLOGY & ADDITIONAL TESTS: Reviewed.

## 2023-08-17 NOTE — PROGRESS NOTE ADULT - PROBLEM SELECTOR PLAN 2
Pt reports trouble swallowing, including choking/vomiting up foods like bread repeatedly. Reports some relation between dysphagia and hx of lap band surgery.    Plan  - consider SLP evaluation

## 2023-08-17 NOTE — PROGRESS NOTE ADULT - ASSESSMENT
82y Male with PMHx of TIA (dx 2 years ago, did not feel any symptoms, reports his PCP does not think he had a TIA but his neurologist did so he takes asa/plavix daily), BPH, DM, diabetic neuropathy, HLD presents to ED for gait ataxia starting 1530 today after having lunch. Walks with a cane at baseline and his gait was worse. Fell at 430pm, no LOC. Reports has chronic bilateral LE weakness and chronic dizziness. CT imaging unremarkable. Case discussed with Dr. Agustin prior to urgent intervention. Risk and benefits of tenecteplase were discussed with patient member including risk of symptomatic ICH/death. Decision was made that benefits outweighed risks and tenecteplase was administered at 1830 on 8/11, stability CTH on 8/12 w/ no bleed. MRI negative for acute stroke. Pt still symptomatic w/ dizziness with position changes and symptoms possibly 2/2 orthostatic hypotension.    Neuro  #CVA workup  - ASA d/c'd patient has history of bleeding ulcers on ASA  - c/w plavix 75mg daily  - continue atorvastatin 80mg daily  - q4hr stroke neuro checks and vitals  - MRI Brain without contrast : Negative  - Stroke Code HCT Results: negative  - Stroke Code CTA Results: Small amount of calcified plaque at the cavernous segments of the bilateral internal carotid arteries. The anterior and middle cerebral arteries are patent at their 1st and 2nd order segments, and appear symmetric caliber. The posterior circulation shows no high grade stenosis or occlusion. Mildly hypoplastic right P1 segment with a prominent posterior communicating artery. Trace amount of calcified plaque in the right V4 segment. 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  - Stroke education    Cards  #HTN  - Gradual Normotension.    #Symptomatic Orthostatic Hypotension  - started on Midodrine 5mg TID, Hold for SBP > 180, increased to 7.5mg TID.  - TTE : Mildly dilated aortic root(needs outpt PCP F/U)  - Will need outpatient 30 day cardiac monitoring and follow up with PCP  - c/w compression stockings    #HLD  - high dose statin as above in CVA  - LDL results: 76    Pulm  - call provider if SPO2 < 94%    GI  #Nutrition/Fluids/Electrolytes   - replete K<4 and Mg <2  - Diet: CCD    Renal  - daily BMP    ID  #fevers  - Patient has been afebrile x24 hours  - BCx: NGTD  - B/L LE and RUE dopplers : Negative    HEME  #Thrombocytopenia  - Platelets now stable  - Trend CBC, stable Hb  - c/w plavix    Endocrine  #DM  - A1C results: 5%  - ac/hs fingersticks  - Home regimen Metformin BID and Mounjaro inJ once a week X 19 weeks, held while inpt.    - TSH results: 1.170    DVT Prophylaxis  - SQL for DVT PPx.  - SCDs for DVT prophylaxis       Dispo: AR vs home PT, pending Re eval by PT.      Discussed daily hospital plans and goals with patient.    Discussed with Neurology Attending Dr. Deanna Nava during rounds.

## 2023-08-17 NOTE — PROGRESS NOTE ADULT - PROBLEM SELECTOR PLAN 1
Pt had Pt had extensive CVA workup after exhibiting gait ataxia i/s/o chronic dizziness. His ASA d/c'd patient has history of bleeding ulcers on ASA. Underwent q4hr stroke neuro checks and vitals. Several imaging studies performed, including - MRI Brain without contrast : Negative, Stroke Code HCT Results: negative, Stroke Code CTA Results: Small amount of calcified plaque at the cavernous segments of the bilateral internal carotid arteries. The anterior and middle cerebral arteries are patent at their 1st and 2nd order segments, and appear symmetric caliber. The posterior circulation shows no high grade stenosis or occlusion. Mildly hypoplastic right P1 segment with a prominent posterior communicating artery. Trace amount of calcified plaque in the right V4 segment. 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    Plan  - continue atorvastatin 80mg daily  - hold ASA  - Stroke education

## 2023-08-17 NOTE — PROGRESS NOTE ADULT - SUBJECTIVE AND OBJECTIVE BOX
Feeling better today.  Less lightheadedness.     VITAL SIGNS:  Vital Signs Last 24 Hrs  T(C): 37.1 (17 Aug 2023 19:25), Max: 37.1 (17 Aug 2023 19:25)  T(F): 98.7 (17 Aug 2023 19:25), Max: 98.7 (17 Aug 2023 19:25)  HR: 58 (17 Aug 2023 19:25) (52 - 78)  BP: 147/75 (17 Aug 2023 19:25) (103/59 - 147/75)  BP(mean): 94 (17 Aug 2023 18:34) (75 - 98)  RR: 18 (17 Aug 2023 19:25) (17 - 21)  SpO2: 97% (17 Aug 2023 19:25) (97% - 98%)    Parameters below as of 17 Aug 2023 19:25  Patient On (Oxygen Delivery Method): room air      Physical exam:  General: no acute distress, sitting up in bed  HEENT: NC/AT; MMM  Cardiovascular: +S1/S2, RRR  Respiratory: CTA B/L; no W/R/R  Gastrointestinal: soft, NT/ND; +BSx4  Extremities: WWP; trace nonpitting edema in bilateral lower extremities, bruising of R elbow, with preserved range of motion and nontender to palpation, sensation intact, no fluctuance or increased skin tautness   Neurological: speech is fluent, follows commands, no facial asymmetry  Psychiatric: pleasant mood and affect    MEDICATIONS:  MEDICATIONS  (STANDING):  buPROPion XL (24-Hour) . 300 milliGRAM(s) Oral daily  calcium carbonate    500 mG (Tums) Chewable 1 Tablet(s) Chew daily  cholecalciferol 1000 Unit(s) Oral daily  clopidogrel Tablet 75 milliGRAM(s) Oral daily  enoxaparin Injectable 40 milliGRAM(s) SubCutaneous every 24 hours  finasteride 5 milliGRAM(s) Oral daily  gabapentin 800 milliGRAM(s) Oral two times a day  midodrine. 7.5 milliGRAM(s) Oral three times a day  polyethylene glycol 3350 17 Gram(s) Oral daily  senna 2 Tablet(s) Oral at bedtime  tamsulosin 0.4 milliGRAM(s) Oral at bedtime    MEDICATIONS  (PRN):  acetaminophen     Tablet .. 650 milliGRAM(s) Oral every 6 hours PRN Temp greater or equal to 38C (100.4F), Mild Pain (1 - 3)      ALLERGIES:  No Known Allergies      LABS:                        11.7   3.09  )-----------( 161      ( 17 Aug 2023 05:30 )             33.5     08-17    139  |  107  |  14  ----------------------------<  80  4.5   |  22  |  1.04    Ca    9.0      17 Aug 2023 05:30  Phos  2.2     08-17  Mg     2.0     08-17          RADIOLOGY & ADDITIONAL TESTS: Reviewed.

## 2023-08-17 NOTE — PROGRESS NOTE ADULT - ASSESSMENT
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  S/p tenecteplase and monitored in the ICU, then stable for transfer to tele stroke and pending further workup.     #CVA workup  - received TNK, follow up scan is stable  - pending MRI  - TTE with mild aortic root dilatation - recommend outpatient follow up and repeat echo  - continue dapt  - continue midodrine for orthostatic hypotension  - rest of plan per stroke team     #fevers  -no leukocytosis, no tachycardia  -UA neg, CXR clear, RVP/covid neg  -no obvious infectious symptoms - says rhinorrhea and cough are chronic  - negative workup. No further fevrs.     #BPH  - continue flomax and finasteride    #DM  #diabetic neuropathy  - on mounjaro and metformin  - A1C is 5  - resume outpatient regimen on discahrge  - continue gabapentin at home dose for neuropathy     #thrombocytopenia  152 on admission, dropped but continues to improve  - no previous exposure to heparin  - continue DAPT  - if worsens, can consider a splenic ultrasound, however will hold on this additional test for now.   - recommend repeat labs in one week with primary care doctor     #normocytic anemia  - stable    #R elbow hematoma  - range of motion preserved, nontender  - ultrasound without evidence of clot  - no decreased sensation, worsening pain, disproportionate pain sensation of other concerning findings for compartment syndrome    50 minutes spent on this encounter, including face to face with patient, care coordination and documentation. Plan of care discussed with stroke team.

## 2023-08-17 NOTE — PROGRESS NOTE ADULT - ASSESSMENT
82y Male with PMHx of TIA (dx 2 years ago, did not feel any symptoms, reports his PCP does not think he had a TIA but his neurologist did so he takes asa/plavix daily), BPH, DM, diabetic neuropathy, HLD presents to ED for gait ataxia starting 1530 today after having lunch. Walks with a cane at baseline and his gait was worse. Fell at 430pm, no LOC. Reports has chronic bilateral LE weakness and chronic dizziness. CT imaging unremarkable. Case discussed with Dr. Agustin prior to urgent intervention. Risk and benefits of tenecteplase were discussed with patient member including risk of symptomatic ICH/death. Decision was made that benefits outweighed risks and tenecteplase was administered at 1830 on 8/11, stability CTH on 8/12 w/ no bleed. MRI negative for acute stroke. Pt still symptomatic w/ dizziness with position changes and symptoms possibly 2/2 orthostatic hypotension.    Neuro      Cards  #HTN  - Gradual Normotension.    #Symptomatic Orthostatic Hypotension  - started on Midodrine 5mg TID, Hold for SBP > 180, increased to 7.5mg TID.  - TTE : Mildly dilated aortic root(needs outpt PCP F/U)  - Will need outpatient 30 day cardiac monitoring and follow up with PCP  - c/w compression stockings    #HLD  - high dose statin as above in CVA  - LDL results: 76    Pulm  - call provider if SPO2 < 94%    GI  #Nutrition/Fluids/Electrolytes   - replete K<4 and Mg <2  - Diet: CCD    Renal  - daily BMP    ID  #fevers  - Patient has been afebrile x24 hours  - BCx: NGTD  - B/L LE and RUE dopplers : Negative    HEME  #Thrombocytopenia  - Platelets now stable  - Trend CBC, stable Hb  - c/w plavix    Endocrine  #DM  - A1C results: 5%  - ac/hs fingersticks  - Home regimen Metformin BID and Mounjaro inJ once a week X 19 weeks, held while inpt.    - TSH results: 1.170    DVT Prophylaxis  - SQL for DVT PPx.  - SCDs for DVT prophylaxis       Dispo: AR vs home PT, pending Re eval by PT.      Discussed daily hospital plans and goals with patient.    Discussed with Neurology Attending Dr. Deanna Nava during rounds.     82y Male with PMHx of TIA (dx 2 years ago, did not feel any symptoms, reports his PCP does not think he had a TIA but his neurologist did so he takes asa/plavix daily), BPH, DM, diabetic neuropathy, HLD presents to ED for gait ataxia starting 1530 today after having lunch. Walks with a cane at baseline and his gait was worse. Fell at 430pm, no LOC. Reports has chronic bilateral LE weakness and chronic dizziness. CT imaging unremarkable. Case discussed with Dr. Agustin prior to urgent intervention. Risk and benefits of tenecteplase were discussed with patient member including risk of symptomatic ICH/death. Decision was made that benefits outweighed risks and tenecteplase was administered at 1830 on 8/11, stability CTH on 8/12 w/ no bleed. MRI negative for acute stroke. Pt still symptomatic w/ dizziness with position changes and symptoms possibly 2/2 orthostatic hypotension.

## 2023-08-17 NOTE — PROGRESS NOTE ADULT - SUBJECTIVE AND OBJECTIVE BOX
----------------TRANSFER FROM STROKE TELE TO REGIONAL----------------    82y Male with PMHx of TIA (dx 2 years ago, did not feel any symptoms, reports his PCP does not think he had a TIA but his neurologist did so he takes plavix daily), BPH, DM, diabetic neuropathy, HLD presents to ED for gait ataxia started suddenly after having lunch. Walks with a cane at baseline and his gait was worse. Fell +HS, no LOC. Reports has chronic bilateral LE weakness and chronic dizziness. CT imaging unremarkable. Tenecteplase was administered on 8/11, stability CTH on 8/12 w/ no bleed. MRI negative for acute stroke. Pt still symptomatic w/ dizziness with position changes and symptoms possibly 2/2 orthostatic hypotension. Started on midodrine 7.5mg TID with good effect. Patient still having some dizziness while working with PT/OT. Will require 30 day cardiac monitoring when outpatient. Will continue with plavix monotherapy for now as presenting symptoms likely related to orthostasis rather than stroke and patient has not tolerated aspirin in the past d/t GI bleeding.       Neurology Stroke Progress Note    INTERVAL HPI/OVERNIGHT EVENTS:  Patient seen and examined. No acute overnight events reported.     MEDICATIONS  (STANDING):  buPROPion XL (24-Hour) . 300 milliGRAM(s) Oral daily  calcium carbonate    500 mG (Tums) Chewable 1 Tablet(s) Chew daily  cholecalciferol 1000 Unit(s) Oral daily  clopidogrel Tablet 75 milliGRAM(s) Oral daily  enoxaparin Injectable 40 milliGRAM(s) SubCutaneous every 24 hours  finasteride 5 milliGRAM(s) Oral daily  gabapentin 800 milliGRAM(s) Oral two times a day  midodrine. 7.5 milliGRAM(s) Oral three times a day  polyethylene glycol 3350 17 Gram(s) Oral daily  senna 2 Tablet(s) Oral at bedtime  tamsulosin 0.4 milliGRAM(s) Oral at bedtime    MEDICATIONS  (PRN):  acetaminophen     Tablet .. 650 milliGRAM(s) Oral every 6 hours PRN Temp greater or equal to 38C (100.4F), Mild Pain (1 - 3)      Allergies    No Known Allergies    Intolerances        Vital Signs Last 24 Hrs  T(C): 36.7 (17 Aug 2023 10:53), Max: 36.9 (17 Aug 2023 04:54)  T(F): 98.1 (17 Aug 2023 10:53), Max: 98.4 (17 Aug 2023 04:54)  HR: 58 (17 Aug 2023 12:00) (52 - 78)  BP: 133/67 (17 Aug 2023 12:00) (103/59 - 147/66)  BP(mean): 93 (17 Aug 2023 12:00) (75 - 98)  RR: 18 (17 Aug 2023 12:00) (18 - 21)  SpO2: 97% (17 Aug 2023 12:00) (96% - 98%)    Parameters below as of 17 Aug 2023 12:00  Patient On (Oxygen Delivery Method): room air        Physical exam:  General: No acute distress, awake and alert  Eyes: Anicteric sclerae, moist conjunctivae, see below for CNs  Neck: trachea midline, FROM, supple, no thyromegaly or lymphadenopathy  Cardiovascular: Regular rate and rhythm on the monitor.  Pulmonary: . No use of accessory muscles  GI: Abdomen soft, non-distended, non-tender  Extremities: Radial and DP pulses +2, RUE hematoma from fall PTA +, w/ Dsg C/D/I. No active bleeding @ this point of time. B/L LE swelling and TTP+ with discolouration B/L shin L>R+. Chronic B/L Shin pain.    Neurologic:  -Mental status: Awake, alert, oriented to person, place, and time. Speech is fluent with intact naming, repetition, and comprehension, no dysarthria. Recent and remote memory intact. Follows commands. Attention/concentration intact.     -Cranial nerves:   II: Visual fields are full to confrontation.  III, IV, VI: Extraocular movements are intact without nystagmus. Pupils equally round and reactive to light.  V:  Facial sensation V1-V3 equal and intact   VII: Face is symmetric with normal eye closure and smile  VIII: Hearing is bilaterally intact to finger rub  Motor: Normal bulk and tone. No pronator drift. Strength bilateral upper extremity 5/5, bilateral lower extremities 4/5   Sensation: chronic bilateral ankle numbness. No neglect or extinction on double simultaneous testing.  Coordination: No dysmetria on finger-to-nose bilaterally      LABS:                        11.7   3.09  )-----------( 161      ( 17 Aug 2023 05:30 )             33.5     08-17    139  |  107  |  14  ----------------------------<  80  4.5   |  22  |  1.04    Ca    9.0      17 Aug 2023 05:30  Phos  2.2     08-17  Mg     2.0     08-17        Urinalysis Basic - ( 17 Aug 2023 05:30 )    Color: x / Appearance: x / SG: x / pH: x  Gluc: 80 mg/dL / Ketone: x  / Bili: x / Urobili: x   Blood: x / Protein: x / Nitrite: x   Leuk Esterase: x / RBC: x / WBC x   Sq Epi: x / Non Sq Epi: x / Bacteria: x        RADIOLOGY & ADDITIONAL TESTS:    MR Head No Cont (08.15.23 @ 23:04) >  IMPRESSION:  No acute infarct or other focal pathology.

## 2023-08-17 NOTE — PROGRESS NOTE ADULT - NS ATTEND AMEND GEN_ALL_CORE FT
The patient is an 82-year-old male with prior ?TIA (previously on Plavix), diabetes, and hyperlipidemia admitted with gait difficulties with falls and dizziness s/p TNK ultimately thought due to symptomatic orthostatic hypotension.  CT/CTA markable.  MRI negative for ischemia. TTE with mild AR dilation, otherwise unremarkable. Continue home Plavix. Continue midodrine for symptomatic orthostatic hypotension which has helped his symptoms. Rec compression stockings, increased salt diet. Monitor fevers, RUE redness. Will downgrade to regional floor.

## 2023-08-17 NOTE — PROGRESS NOTE ADULT - NUTRITIONAL ASSESSMENT
This patient has been assessed with a concern for Malnutrition and has been determined to have a diagnosis/diagnoses of Severe protein-calorie malnutrition.    This patient is being managed with:   Diet Consistent Carbohydrate/No Snacks-  DASH/TLC {Sodium & Cholesterol Restricted} (DASH)  Entered: Aug 12 2023  5:14AM    Neuro  #CVA workup  - ASA d/c'd patient has history of bleeding ulcers on ASA  - c/w plavix 75mg daily  - continue atorvastatin 80mg daily  - q4hr stroke neuro checks and vitals  - MRI Brain without contrast : Negative  - Stroke Code HCT Results: negative  - Stroke Code CTA Results: Small amount of calcified plaque at the cavernous segments of the bilateral internal carotid arteries. The anterior and middle cerebral arteries are patent at their 1st and 2nd order segments, and appear symmetric caliber. The posterior circulation shows no high grade stenosis or occlusion. Mildly hypoplastic right P1 segment with a prominent posterior communicating artery. Trace amount of calcified plaque in the right V4 segment. 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  - Stroke education    Cards  #HTN  - Gradual Normotension.    #Symptomatic Orthostatic Hypotension  - started on Midodrine 5mg TID, Hold for SBP > 180, increased to 7.5mg TID.  - TTE : Mildly dilated aortic root(needs outpt PCP F/U)  - Will need outpatient 30 day cardiac monitoring and follow up with PCP  - c/w compression stockings    #HLD  - high dose statin as above in CVA  - LDL results: 76    Pulm  - call provider if SPO2 < 94%    GI  #Nutrition/Fluids/Electrolytes   - replete K<4 and Mg <2  - Diet: CCD    Renal  - daily BMP    ID  #fevers  - Patient has been afebrile x24 hours  - BCx: NGTD  - B/L LE and RUE dopplers : Negative    HEME  #Thrombocytopenia  - Platelets now stable  - Trend CBC, stable Hb  - c/w plavix    Endocrine  #DM  - A1C results: 5%  - ac/hs fingersticks  - Home regimen Metformin BID and Mounjaro inJ once a week X 19 weeks, held while inpt.    - TSH results: 1.170    DVT Prophylaxis  - SQL for DVT PPx.  - SCDs for DVT prophylaxis This patient has been assessed with a concern for Malnutrition and has been determined to have a diagnosis/diagnoses of Severe protein-calorie malnutrition.    This patient is being managed with:   Diet Consistent Carbohydrate/No Snacks-  DASH/TLC {Sodium & Cholesterol Restricted} (DASH)  Entered: Aug 12 2023  5:14AM

## 2023-08-17 NOTE — PROGRESS NOTE ADULT - PROBLEM SELECTOR PLAN 3
Pt w/ Symptomatic Orthostatic Hypotension. TTE shows mildly dilated aortic root (likely needs outpt PCP f/u).    Plan  - c/w Midodrine 5mg TID, Hold for SBP > 180, increased to 7.5mg TID.  - Will need outpatient 30 day cardiac monitoring and follow up with PCP  - c/w compression stockings

## 2023-08-18 ENCOUNTER — TRANSCRIPTION ENCOUNTER (OUTPATIENT)
Age: 83
End: 2023-08-18

## 2023-08-18 VITALS
SYSTOLIC BLOOD PRESSURE: 120 MMHG | DIASTOLIC BLOOD PRESSURE: 78 MMHG | HEART RATE: 80 BPM | OXYGEN SATURATION: 99 % | TEMPERATURE: 98 F

## 2023-08-18 DIAGNOSIS — R13.10 DYSPHAGIA, UNSPECIFIED: ICD-10-CM

## 2023-08-18 DIAGNOSIS — E11.9 TYPE 2 DIABETES MELLITUS WITHOUT COMPLICATIONS: ICD-10-CM

## 2023-08-18 DIAGNOSIS — D69.6 THROMBOCYTOPENIA, UNSPECIFIED: ICD-10-CM

## 2023-08-18 DIAGNOSIS — I10 ESSENTIAL (PRIMARY) HYPERTENSION: ICD-10-CM

## 2023-08-18 DIAGNOSIS — E78.5 HYPERLIPIDEMIA, UNSPECIFIED: ICD-10-CM

## 2023-08-18 DIAGNOSIS — I63.9 CEREBRAL INFARCTION, UNSPECIFIED: ICD-10-CM

## 2023-08-18 DIAGNOSIS — R27.8 OTHER LACK OF COORDINATION: ICD-10-CM

## 2023-08-18 DIAGNOSIS — Z29.9 ENCOUNTER FOR PROPHYLACTIC MEASURES, UNSPECIFIED: ICD-10-CM

## 2023-08-18 LAB
ANION GAP SERPL CALC-SCNC: 9 MMOL/L — SIGNIFICANT CHANGE UP (ref 5–17)
BUN SERPL-MCNC: 16 MG/DL — SIGNIFICANT CHANGE UP (ref 7–23)
CALCIUM SERPL-MCNC: 9 MG/DL — SIGNIFICANT CHANGE UP (ref 8.4–10.5)
CHLORIDE SERPL-SCNC: 107 MMOL/L — SIGNIFICANT CHANGE UP (ref 96–108)
CO2 SERPL-SCNC: 24 MMOL/L — SIGNIFICANT CHANGE UP (ref 22–31)
CREAT SERPL-MCNC: 0.99 MG/DL — SIGNIFICANT CHANGE UP (ref 0.5–1.3)
CULTURE RESULTS: SIGNIFICANT CHANGE UP
CULTURE RESULTS: SIGNIFICANT CHANGE UP
EGFR: 77 ML/MIN/1.73M2 — SIGNIFICANT CHANGE UP
GLUCOSE SERPL-MCNC: 86 MG/DL — SIGNIFICANT CHANGE UP (ref 70–99)
HCT VFR BLD CALC: 33.1 % — LOW (ref 39–50)
HGB BLD-MCNC: 11.8 G/DL — LOW (ref 13–17)
MAGNESIUM SERPL-MCNC: 1.9 MG/DL — SIGNIFICANT CHANGE UP (ref 1.6–2.6)
MCHC RBC-ENTMCNC: 33.1 PG — SIGNIFICANT CHANGE UP (ref 27–34)
MCHC RBC-ENTMCNC: 35.6 GM/DL — SIGNIFICANT CHANGE UP (ref 32–36)
MCV RBC AUTO: 93 FL — SIGNIFICANT CHANGE UP (ref 80–100)
NRBC # BLD: 0 /100 WBCS — SIGNIFICANT CHANGE UP (ref 0–0)
PHOSPHATE SERPL-MCNC: 2.7 MG/DL — SIGNIFICANT CHANGE UP (ref 2.5–4.5)
PLATELET # BLD AUTO: 181 K/UL — SIGNIFICANT CHANGE UP (ref 150–400)
POTASSIUM SERPL-MCNC: 4.1 MMOL/L — SIGNIFICANT CHANGE UP (ref 3.5–5.3)
POTASSIUM SERPL-SCNC: 4.1 MMOL/L — SIGNIFICANT CHANGE UP (ref 3.5–5.3)
RBC # BLD: 3.56 M/UL — LOW (ref 4.2–5.8)
RBC # FLD: 13 % — SIGNIFICANT CHANGE UP (ref 10.3–14.5)
SODIUM SERPL-SCNC: 140 MMOL/L — SIGNIFICANT CHANGE UP (ref 135–145)
SPECIMEN SOURCE: SIGNIFICANT CHANGE UP
SPECIMEN SOURCE: SIGNIFICANT CHANGE UP
WBC # BLD: 3.43 K/UL — LOW (ref 3.8–10.5)
WBC # FLD AUTO: 3.43 K/UL — LOW (ref 3.8–10.5)

## 2023-08-18 PROCEDURE — 82962 GLUCOSE BLOOD TEST: CPT

## 2023-08-18 PROCEDURE — 97116 GAIT TRAINING THERAPY: CPT

## 2023-08-18 PROCEDURE — 85730 THROMBOPLASTIN TIME PARTIAL: CPT

## 2023-08-18 PROCEDURE — 84443 ASSAY THYROID STIM HORMONE: CPT

## 2023-08-18 PROCEDURE — A9585: CPT

## 2023-08-18 PROCEDURE — 97530 THERAPEUTIC ACTIVITIES: CPT

## 2023-08-18 PROCEDURE — 93971 EXTREMITY STUDY: CPT

## 2023-08-18 PROCEDURE — 84484 ASSAY OF TROPONIN QUANT: CPT

## 2023-08-18 PROCEDURE — 85610 PROTHROMBIN TIME: CPT

## 2023-08-18 PROCEDURE — 97165 OT EVAL LOW COMPLEX 30 MIN: CPT

## 2023-08-18 PROCEDURE — 70496 CT ANGIOGRAPHY HEAD: CPT | Mod: MA

## 2023-08-18 PROCEDURE — 93970 EXTREMITY STUDY: CPT

## 2023-08-18 PROCEDURE — 71045 X-RAY EXAM CHEST 1 VIEW: CPT

## 2023-08-18 PROCEDURE — 70450 CT HEAD/BRAIN W/O DYE: CPT

## 2023-08-18 PROCEDURE — 36415 COLL VENOUS BLD VENIPUNCTURE: CPT

## 2023-08-18 PROCEDURE — 83735 ASSAY OF MAGNESIUM: CPT

## 2023-08-18 PROCEDURE — 99239 HOSP IP/OBS DSCHRG MGMT >30: CPT

## 2023-08-18 PROCEDURE — 87040 BLOOD CULTURE FOR BACTERIA: CPT

## 2023-08-18 PROCEDURE — 80061 LIPID PANEL: CPT

## 2023-08-18 PROCEDURE — 70498 CT ANGIOGRAPHY NECK: CPT | Mod: MA

## 2023-08-18 PROCEDURE — 80053 COMPREHEN METABOLIC PANEL: CPT

## 2023-08-18 PROCEDURE — 83036 HEMOGLOBIN GLYCOSYLATED A1C: CPT

## 2023-08-18 PROCEDURE — 99233 SBSQ HOSP IP/OBS HIGH 50: CPT

## 2023-08-18 PROCEDURE — 93306 TTE W/DOPPLER COMPLETE: CPT

## 2023-08-18 PROCEDURE — 85025 COMPLETE CBC W/AUTO DIFF WBC: CPT

## 2023-08-18 PROCEDURE — 0042T: CPT | Mod: MA

## 2023-08-18 PROCEDURE — 80048 BASIC METABOLIC PNL TOTAL CA: CPT

## 2023-08-18 PROCEDURE — 97110 THERAPEUTIC EXERCISES: CPT

## 2023-08-18 PROCEDURE — 99291 CRITICAL CARE FIRST HOUR: CPT

## 2023-08-18 PROCEDURE — 97162 PT EVAL MOD COMPLEX 30 MIN: CPT

## 2023-08-18 PROCEDURE — 84100 ASSAY OF PHOSPHORUS: CPT

## 2023-08-18 PROCEDURE — 81001 URINALYSIS AUTO W/SCOPE: CPT

## 2023-08-18 PROCEDURE — 85027 COMPLETE CBC AUTOMATED: CPT

## 2023-08-18 PROCEDURE — 70551 MRI BRAIN STEM W/O DYE: CPT

## 2023-08-18 PROCEDURE — 0225U NFCT DS DNA&RNA 21 SARSCOV2: CPT

## 2023-08-18 PROCEDURE — 93005 ELECTROCARDIOGRAM TRACING: CPT

## 2023-08-18 RX ORDER — FINASTERIDE 5 MG/1
1 TABLET, FILM COATED ORAL
Qty: 0 | Refills: 0 | DISCHARGE

## 2023-08-18 RX ORDER — TIRZEPATIDE 15 MG/.5ML
2.5 INJECTION, SOLUTION SUBCUTANEOUS
Qty: 0 | Refills: 0 | DISCHARGE

## 2023-08-18 RX ORDER — CHOLECALCIFEROL (VITAMIN D3) 125 MCG
1 CAPSULE ORAL
Refills: 0 | DISCHARGE

## 2023-08-18 RX ORDER — LOPERAMIDE HCL 2 MG
1 TABLET ORAL
Refills: 0 | DISCHARGE

## 2023-08-18 RX ORDER — MIDODRINE HYDROCHLORIDE 2.5 MG/1
3 TABLET ORAL
Qty: 0 | Refills: 0 | DISCHARGE
Start: 2023-08-18

## 2023-08-18 RX ORDER — PREGABALIN 225 MG/1
0 CAPSULE ORAL
Refills: 0 | DISCHARGE

## 2023-08-18 RX ORDER — CLOPIDOGREL BISULFATE 75 MG/1
1 TABLET, FILM COATED ORAL
Qty: 0 | Refills: 0 | DISCHARGE

## 2023-08-18 RX ORDER — METFORMIN HYDROCHLORIDE 850 MG/1
1 TABLET ORAL
Qty: 0 | Refills: 0 | DISCHARGE

## 2023-08-18 RX ORDER — POLYETHYLENE GLYCOL 3350 17 G/17G
17 POWDER, FOR SOLUTION ORAL
Qty: 0 | Refills: 0 | DISCHARGE
Start: 2023-08-18

## 2023-08-18 RX ORDER — TAMSULOSIN HYDROCHLORIDE 0.4 MG/1
1 CAPSULE ORAL
Qty: 0 | Refills: 0 | DISCHARGE

## 2023-08-18 RX ORDER — SENNA PLUS 8.6 MG/1
2 TABLET ORAL
Qty: 0 | Refills: 0 | DISCHARGE
Start: 2023-08-18

## 2023-08-18 RX ORDER — DOXEPIN HCL 100 MG
1 CAPSULE ORAL
Refills: 0 | DISCHARGE

## 2023-08-18 RX ADMIN — POLYETHYLENE GLYCOL 3350 17 GRAM(S): 17 POWDER, FOR SOLUTION ORAL at 12:11

## 2023-08-18 RX ADMIN — FINASTERIDE 5 MILLIGRAM(S): 5 TABLET, FILM COATED ORAL at 12:11

## 2023-08-18 RX ADMIN — MIDODRINE HYDROCHLORIDE 7.5 MILLIGRAM(S): 2.5 TABLET ORAL at 12:06

## 2023-08-18 RX ADMIN — BUPROPION HYDROCHLORIDE 300 MILLIGRAM(S): 150 TABLET, EXTENDED RELEASE ORAL at 12:06

## 2023-08-18 RX ADMIN — GABAPENTIN 800 MILLIGRAM(S): 400 CAPSULE ORAL at 07:29

## 2023-08-18 RX ADMIN — CLOPIDOGREL BISULFATE 75 MILLIGRAM(S): 75 TABLET, FILM COATED ORAL at 12:11

## 2023-08-18 RX ADMIN — Medication 1000 UNIT(S): at 12:06

## 2023-08-18 RX ADMIN — Medication 1 TABLET(S): at 12:06

## 2023-08-18 RX ADMIN — ENOXAPARIN SODIUM 40 MILLIGRAM(S): 100 INJECTION SUBCUTANEOUS at 12:11

## 2023-08-18 RX ADMIN — MIDODRINE HYDROCHLORIDE 7.5 MILLIGRAM(S): 2.5 TABLET ORAL at 07:29

## 2023-08-18 NOTE — DISCHARGE NOTE PROVIDER - CARE PROVIDER_API CALL
MAXX Baltazar  [address] [place] [address]   [id number]  Phone: [phone]  Fax: (   )    -  Follow Up Time:     Brody Truong  Internal Medicine  [address] [place] [id number]  Phone: [phone]  Fax: [phone]  Follow Up Time:

## 2023-08-18 NOTE — DISCHARGE NOTE PROVIDER - NSDCCPCAREPLAN_GEN_ALL_CORE_FT
PRINCIPAL DISCHARGE DIAGNOSIS  Diagnosis: Orthostatic hypotension  Assessment and Plan of Treatment: Postural hypotension or orthostatic hypotension is when your blood pressure drops when you go from lying down to sitting up, or from  sitting to standing.When your blood pressure drops, less blood can go to your organs and muscles. This can make you more likely to fall.  What are the symptoms? Although many people with  postural hypotension have no symptoms, others do. These symptoms can differ from person to person, and may include:  • Dizziness or lightheadedness  • Feeling about to faint, passing out,  or falling  • Headaches, blurry or tunnel vision  • Feeling vague or muddled  • Feeling pressure across the back of your shoulders or neck  • Feeling nauseous, or hot and clammy  • Weakness or fatigue  When might symptoms happen?  • When standing or sitting up suddenly  • In the morning when blood pressure is naturally lower  • After a large meal or alcohol  • During exercise  • When straining on the toilet  • When you are ill  • If you become anxious or panicky  What can I do to manage my postural hypotension?  • Ask if any of your medicines should be reduced or stopped.  • Get out of bed slowly. First sit up, sit on the side of the bed, then   • Tell your healthcare provider about any symptoms.stand up.  • Take your time when changingposition, such as when getting up  from a chair.  • Try to sit down when washing, showering, dressing, or working  in the kitchen.  • Exercise gently before getting up (move your feet up and down and  clench and unclench your hands) or after standing (march in place).  • Make sure you have something to hold on to when you stand up.  • Do not walk if you feel dizzy.  • Drink 6-8 glasses of water or low-calorie drinks each day—unless  you have been told to limit your fluid intake.  • Avoid taking very hot baths or showers.  • Try sleeping with extra pillows to raise your head.

## 2023-08-18 NOTE — PROGRESS NOTE ADULT - ASSESSMENT
Neurology    82 y o Male with PMHx of TIA (dx 2 years ago, did not feel any symptoms, reports his PCP does not think he had a TIA but his neurologist did so he takes asa/plavix daily), BPH, DM, diabetic neuropathy, HLD presents to ED for gait ataxia starting 1530 today after having lunch. Walks with a cane at baseline and his gait was worse. Fell at 430pm, no LOC. Reports has chronic bilateral LE weakness and chronic dizziness. CT imaging unremarkable. Case discussed with Dr. Agustin prior to urgent intervention. Risk and benefits of tenecteplase were discussed with patient member including risk of symptomatic ICH/death. Decision was made that benefits outweighed risks and tenecteplase was administered at 1830 on 8/11, stability CTH on 8/12 w/ no bleed. MRI negative for acute stroke. Pt still symptomatic w/ dizziness with position changes and symptoms possibly 2/2 orthostatic hypotension.    Neuro  #CVA workup  - ASA d/c'd patient has history of bleeding ulcers on ASA  - c/w plavix 75mg daily  - continue atorvastatin 80mg daily  - q4hr stroke neuro checks and vitals  - MRI Brain without contrast : Negative  - Stroke Code HCT Results: negative  - Stroke Code CTA Results: Small amount of calcified plaque at the cavernous segments of the bilateral internal carotid arteries. The anterior and middle cerebral arteries are patent at their 1st and 2nd order segments, and appear symmetric caliber. The posterior circulation shows no high grade stenosis or occlusion. Mildly hypoplastic right P1 segment with a prominent posterior communicating artery. Trace amount of calcified plaque in the right V4 segment. 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  - Stroke education    Cards  #HTN  - Gradual Normotension.    #Symptomatic Orthostatic Hypotension  - started on Midodrine 5mg TID, Hold for SBP > 180, increased to 7.5mg TID.  - TTE : Mildly dilated aortic root(needs outpt PCP F/U)  - Will need outpatient 30 day cardiac monitoring and follow up with PCP  - c/w compression stockings    #HLD  - high dose statin as above in CVA  - LDL results: 76    Pulm  - call provider if SPO2 < 94%    GI  #Nutrition/Fluids/Electrolytes   - replete K<4 and Mg <2  - Diet: CCD    Renal  - daily BMP    ID  #fevers  - Patient has been afebrile x24 hours  - BCx: NGTD  - B/L LE and RUE dopplers : Negative    HEME  #Thrombocytopenia  - Platelets now stable  - Trend CBC, stable Hb  - c/w plavix    Endocrine  #DM  - A1C results: 5%  - ac/hs fingersticks  - Home regimen Metformin BID and Mounjaro inJ once a week X 19 weeks, held while inpt.    - TSH results: 1.170    DVT Prophylaxis  - SQL for DVT PPx.  - SCDs for DVT prophylaxis

## 2023-08-18 NOTE — DISCHARGE NOTE NURSING/CASE MANAGEMENT/SOCIAL WORK - PATIENT PORTAL LINK FT
You can access the FollowMyHealth Patient Portal offered by [facility] by registering at the following website: [email or web address]. By joining [facility]’s FollowMyHealth portal, you will also be able to view your health information using other applications (apps) compatible with our system. Adjacent Tissue Transfer Text: The defect edges were debeveled with a #15 scalpel blade.  Given the location of the defect and the proximity to free margins an adjacent tissue transfer was deemed most appropriate.  Using a sterile surgical marker, an appropriate flap was drawn incorporating the defect and placing the expected incisions within the relaxed skin tension lines where possible.    The area thus outlined was incised deep to adipose tissue with a #15 scalpel blade.  The skin margins were undermined to an appropriate distance in all directions utilizing iris scissors.

## 2023-08-18 NOTE — PROGRESS NOTE ADULT - SUBJECTIVE AND OBJECTIVE BOX
Feeling well, and looking forward to getting to rehab facility.      Remaining ROS negative     PHYSICAL EXAM:    General: no acute distress, sitting up in bed  HEENT: NC/AT; MMM  Cardiovascular: +S1/S2, RRR  Respiratory: CTA B/L; no W/R/R  Gastrointestinal: soft, NT/ND; +BSx4  Extremities: WWP; trace nonpitting edema in bilateral lower extremities, bruising of R elbow, with preserved range of motion and nontender to palpation, sensation intact, no fluctuance or increased skin tautness   Neurological: speech is fluent, follows commands, no facial asymmetry  Psychiatric: pleasant mood and affect    VITAL SIGNS:  Vital Signs Last 24 Hrs  T(C): 36.7 (18 Aug 2023 12:56), Max: 37.2 (17 Aug 2023 20:48)  T(F): 98 (18 Aug 2023 12:56), Max: 98.9 (17 Aug 2023 20:48)  HR: 80 (18 Aug 2023 12:56) (50 - 80)  BP: 120/78 (18 Aug 2023 12:56) (120/64 - 147/75)  BP(mean): 83 (18 Aug 2023 05:37) (83 - 94)  RR: 18 (18 Aug 2023 05:37) (16 - 18)  SpO2: 99% (18 Aug 2023 12:56) (97% - 99%)    Parameters below as of 18 Aug 2023 05:37  Patient On (Oxygen Delivery Method): room air    MEDICATIONS:  MEDICATIONS  (STANDING):  buPROPion XL (24-Hour) . 300 milliGRAM(s) Oral daily  calcium carbonate    500 mG (Tums) Chewable 1 Tablet(s) Chew daily  cholecalciferol 1000 Unit(s) Oral daily  clopidogrel Tablet 75 milliGRAM(s) Oral daily  enoxaparin Injectable 40 milliGRAM(s) SubCutaneous every 24 hours  finasteride 5 milliGRAM(s) Oral daily  gabapentin 800 milliGRAM(s) Oral two times a day  midodrine. 7.5 milliGRAM(s) Oral three times a day  polyethylene glycol 3350 17 Gram(s) Oral daily  senna 2 Tablet(s) Oral at bedtime  tamsulosin 0.4 milliGRAM(s) Oral at bedtime    MEDICATIONS  (PRN):  acetaminophen     Tablet .. 650 milliGRAM(s) Oral every 6 hours PRN Temp greater or equal to 38C (100.4F), Mild Pain (1 - 3)    ALLERGIES:  Allergies    No Known Allergies    Intolerances        LABS:                        11.8   3.43  )-----------( 181      ( 18 Aug 2023 05:30 )             33.1     08-18    140  |  107  |  16  ----------------------------<  86  4.1   |  24  |  0.99    Ca    9.0      18 Aug 2023 05:30  Phos  2.7     08-18  Mg     1.9     08-18        Urinalysis Basic - ( 18 Aug 2023 05:30 )    Color: x / Appearance: x / SG: x / pH: x  Gluc: 86 mg/dL / Ketone: x  / Bili: x / Urobili: x   Blood: x / Protein: x / Nitrite: x   Leuk Esterase: x / RBC: x / WBC x   Sq Epi: x / Non Sq Epi: x / Bacteria: x      CAPILLARY BLOOD GLUCOSE    RADIOLOGY & ADDITIONAL TESTS: Reviewed.

## 2023-08-18 NOTE — PROGRESS NOTE ADULT - PROBLEM SELECTOR PLAN 4
Known hx of HLD. High dose statin as above in CVA. LDL results: 76    Plan  - f/u outpt
Known hx of HLD. High dose statin as above in CVA. LDL results: 76    Plan  - f/u outpt

## 2023-08-18 NOTE — DISCHARGE NOTE PROVIDER - PROVIDER TOKENS
FREE:[LAST:[Giovanny],FIRST:[MAXX Rogers],PHONE:[phone]],FAX:[(   )    -],ADDRESS:[[address] [place] [address]]],PROVIDER:[[id number]]]

## 2023-08-18 NOTE — PROGRESS NOTE ADULT - PROBLEM SELECTOR PLAN 6
Known hx of DM. A1C results (8/12): 5%. Home regimen = Metformin BID and Mounjaro inJ once a week X 19wks    Plan  - hold home meds while inpatient  - finger sticks have been WNL.   - consider ISS as needed
Known hx of DM. A1C results (8/12): 5%. Home regimen = Metformin BID and Mounjaro inJ once a week X 19wks    Plan  - hold home meds while inpatient  - finger sticks have been WNL.   - consider ISS as needed

## 2023-08-18 NOTE — PROGRESS NOTE ADULT - PROBLEM SELECTOR PLAN 2
Pt reports trouble swallowing, including choking/vomiting up foods like bread repeatedly. Reports some relation between dysphagia and hx of lap band surgery.    Plan  - consider SLP evaluation Pt reports that he has trouble swallowing at times but it has not happened recently.    Plan  - consider SLP evaluation

## 2023-08-18 NOTE — DISCHARGE NOTE PROVIDER - HOSPITAL COURSE
Hospital course:  82y Male with PMHx of TIA (dx 2 years ago, did not feel any symptoms, reports his PCP does not think he had a TIA but his neurologist did so he takes plavix daily), BPH, DM, diabetic neuropathy, HLD presents to ED for gait ataxia started suddenly after having lunch. Walks with a cane at baseline and his gait was worse. Fell +HS, no LOC. Reports has chronic bilateral LE weakness and chronic dizziness. CT imaging unremarkable. Tenecteplase was administered on 8/11, stability CTH on 8/12 w/ no bleed. MRI negative for acute stroke. Pt still symptomatic w/ dizziness with position changes and symptoms possibly 2/2 orthostatic hypotension. Started on midodrine 7.5mg TID with good effect. Patient still having some dizziness while working with PT/OT. Will require 30 day cardiac monitoring when outpatient. Will continue with plavix monotherapy for now as presenting symptoms likely related to orthostasis rather than stroke and patient has not tolerated aspirin in the past d/t GI bleeding. Course complicated by fever of unknown origin, blood cultures with no growth to date. Now afebrile > 24 hrs. Also noted to have edema and ecchymosis of the RUE 2/2 to his fall, duplex negative for DVT. Plan to discharge to [facility].    During this hospital course, patient did not have a stroke.    Patient had the following workup done in house:  CT Brain Stroke Protocol (08.11.23 @ 18:07)   IMPRESSION: No acute intracranial hemorrhage or acute territorial infarct.    CT Angio Brain Stroke Protocol  w/ IV Cont (08.11.23 @ 18:13)   IMPRESSION: No interval change from 5/20/23. No intracranial or extracranial arterial   steno-occlusive disease.    CT Brain Perfusion Maps Stroke (08.11.23 @ 18:10)   IMPRESSION: Negative CT perfusion of the brain    MR Head No Cont (08.15.23 @ 23:04)   IMPRESSION: No acute infarct or other focal pathology.    TTE Echo Complete w/o Contrast w/ Doppler (08.14.23 @ 15:27)   CONCLUSIONS:   1. Normal left and right ventricular size and systolic function.   2. No significant valvular disease.   3. No evidence of pulmonary hypertension.   4. No pericardial effusion.   5. The aortic root is mildly dilated. The aortic root measures 3.88 cm   at level of the sinuses of Valsalva (normal 3.1-3.7 cm for men, 2.7-3.3   cm for women). The proximal ascending aorta is mildly dilated. The   proximal ascending aorta measures 3.60 cm (normal 2.6-3.4 cm for men,   2.3-3.1 cm for women).  6. No prior echo is available for comparison.    US Duplex Venous Lower Ext Complete, Bilateral (08.13.23 @ 16:23)  IMPRESSION: No evidence of deep venous thrombosis in either lower extremity.    US Duplex Venous Upper Ext Ltd, Right (08.13.23 @ 16:21)   IMPRESSION: No evidence of right upper extremity deep venous thrombosis.    [x]labs  A1C 5.0   LDL 76   TSH 1.17    Physical exam at discharge:  -Mental status: Awake, alert, oriented to person, place, and time. Speech is fluent with intact naming, repetition, and comprehension, no dysarthria. Recent and remote memory intact. Follows commands. Attention/concentration intact.   -Cranial nerves:   II: Visual fields are full to confrontation.  III, IV, VI: Extraocular movements are intact without nystagmus. Pupils equally round and reactive to light.  V:  Facial sensation V1-V3 equal and intact   VII: Face is symmetric with normal eye closure and smile  VIII: Hearing is bilaterally intact to finger rub  IX, X: Uvula is midline and soft palate rises symmetrically  XI: Head turning and shoulder shrug are intact.  XII: Tongue protrudes midline  Motor: Normal bulk and tone. No pronator drift. Strength bilateral upper extremity 5/5. Strength bilateral lower extremities 5/5.  Coordination: No dysmetria on finger-to-nose bilaterally  Gait: Deferred    Discharge NIHSS: 0    New medications on discharge: Midodrine 7.5 mg PO TID, Atorvastatin 20 mg PO QD  Labs to be followed up: N/A  Imaging to be done as outpatient: N/A  Further outpatient workup: outpatient cardiac monitor, follow up with your neurologist    Hospital course:  82y Male with PMHx of TIA (dx 2 years ago, did not feel any symptoms, reports his PCP does not think he had a TIA but his neurologist did so he takes plavix daily), BPH, DM, diabetic neuropathy, HLD presents to ED for gait ataxia started suddenly after having lunch. Walks with a cane at baseline and his gait was worse. Fell +HS, no LOC. Reports has chronic bilateral LE weakness and chronic dizziness. CT imaging unremarkable. Tenecteplase was administered on 8/11, stability CTH on 8/12 w/ no bleed. MRI negative for acute stroke. Pt still symptomatic w/ dizziness with position changes and symptoms possibly 2/2 orthostatic hypotension. Started on midodrine 7.5mg TID with good effect. Patient still having some dizziness while working with PT/OT. Will require 30 day cardiac monitoring when outpatient. Will continue with plavix monotherapy for now as presenting symptoms likely related to orthostasis rather than stroke and patient has not tolerated aspirin in the past d/t GI bleeding. Course complicated by fever of unknown origin, blood cultures with no growth to date. Now afebrile > 24 hrs. Also noted to have edema and ecchymosis of the RUE 2/2 to his fall, duplex negative for DVT. Recommend follow up with PCP to determine if any additional imaging is needed. Plan to discharge to [facility].    During this hospital course, patient did not have a stroke.    Patient had the following workup done in house:  CT Brain Stroke Protocol (08.11.23 @ 18:07)   IMPRESSION: No acute intracranial hemorrhage or acute territorial infarct.    CT Angio Brain Stroke Protocol  w/ IV Cont (08.11.23 @ 18:13)   IMPRESSION: No interval change from 5/20/23. No intracranial or extracranial arterial   steno-occlusive disease.    CT Brain Perfusion Maps Stroke (08.11.23 @ 18:10)   IMPRESSION: Negative CT perfusion of the brain    MR Head No Cont (08.15.23 @ 23:04)   IMPRESSION: No acute infarct or other focal pathology.    TTE Echo Complete w/o Contrast w/ Doppler (08.14.23 @ 15:27)   CONCLUSIONS:   1. Normal left and right ventricular size and systolic function.   2. No significant valvular disease.   3. No evidence of pulmonary hypertension.   4. No pericardial effusion.   5. The aortic root is mildly dilated. The aortic root measures 3.88 cm   at level of the sinuses of Valsalva (normal 3.1-3.7 cm for men, 2.7-3.3   cm for women). The proximal ascending aorta is mildly dilated. The   proximal ascending aorta measures 3.60 cm (normal 2.6-3.4 cm for men,   2.3-3.1 cm for women).  6. No prior echo is available for comparison.    US Duplex Venous Lower Ext Complete, Bilateral (08.13.23 @ 16:23)  IMPRESSION: No evidence of deep venous thrombosis in either lower extremity.    US Duplex Venous Upper Ext Ltd, Right (08.13.23 @ 16:21)   IMPRESSION: No evidence of right upper extremity deep venous thrombosis.    [x]labs  A1C 5.0   LDL 76   TSH 1.17    Physical exam at discharge:  -Mental status: Awake, alert, oriented to person, place, and time. Speech is fluent with intact naming, repetition, and comprehension, no dysarthria. Recent and remote memory intact. Follows commands. Attention/concentration intact.   -Cranial nerves:   II: Visual fields are full to confrontation.  III, IV, VI: Extraocular movements are intact without nystagmus. Pupils equally round and reactive to light.  V:  Facial sensation V1-V3 equal and intact   VII: Face is symmetric with normal eye closure and smile  VIII: Hearing is bilaterally intact to finger rub  IX, X: Uvula is midline and soft palate rises symmetrically  XI: Head turning and shoulder shrug are intact.  XII: Tongue protrudes midline  Motor: Normal bulk and tone. No pronator drift. Strength bilateral upper extremity 5/5. Strength bilateral lower extremities 5/5.  Coordination: No dysmetria on finger-to-nose bilaterally  Gait: Deferred    Discharge NIHSS: 0    New medications on discharge: Midodrine 7.5 mg PO TID, Atorvastatin 20 mg PO QD  Labs to be followed up: N/A  Imaging to be done as outpatient: N/A  Further outpatient workup: outpatient cardiac monitor, follow up with your neurologist

## 2023-08-18 NOTE — PROGRESS NOTE ADULT - PROBLEM SELECTOR PLAN 5
RESOLVED: Pt presented w/ thrombocytopenia to 118. Platelets now stable and WNL.     Plan  - Trend CBC, stable Hb  - c/w plavix
RESOLVED: Pt presented w/ thrombocytopenia to 118. Platelets now stable and WNL.     Plan  - Trend CBC, stable Hb  - c/w plavix

## 2023-08-18 NOTE — PROGRESS NOTE ADULT - SUBJECTIVE AND OBJECTIVE BOX
----------------ACCEPTING TRANSFER FROM STROKE TELE TO [facility]----------------    HOSPITAL COURSE: 82y Male with PMHx of TIA (dx 2 years ago, did not feel any symptoms, reports his PCP does not think he had a TIA but his neurologist did so he takes plavix daily), BPH, DM, diabetic neuropathy, HLD presents to ED for gait ataxia started suddenly after having lunch. Walks with a cane at baseline and his gait was worse. Fell +HS, no LOC. Reports has chronic bilateral LE weakness and chronic dizziness. CT imaging unremarkable. Tenecteplase was administered on 8/11, stability CTH on 8/12 w/ no bleed. MRI negative for acute stroke. Pt still symptomatic w/ dizziness with position changes and symptoms possibly 2/2 orthostatic hypotension. Started on midodrine 7.5mg TID with good effect. Patient still having some dizziness while working with PT/OT. Will require 30 day cardiac monitoring when outpatient. Will continue with plavix monotherapy for now as presenting symptoms likely related to orthostasis rather than stroke and patient has not tolerated aspirin in the past d/t GI bleeding.     INTERVAL HX/SUBJECTIVE:  At bedside pt is amiable. Only complaints are of: leg weakness, difficulty swallowing (especially when he eats dry things like bread, he may have to go to the bathroom to choke/vomit it up), dizziness which he says is minimal-to-none when lying supine in bed but worsens when sitting up or moving around. Pt complains of worsening memory for the past several years, and word-finding difficulty which he presumes is due to aging. Pt complains of chronic intermittent diarrhea, that he says comes and goes in bouts. He reports last time he had a bout of diarrhea was 4-5 wks ago. Pt denies headache, denies abdominal pain, denies abnormal urinary symptoms. Pt electively reports that he is amenable for MIKHAIL and even has his preferred MIKHAIL picked out.       Vital Signs Last 24 Hrs  T(C): 36.6 (18 Aug 2023 05:37), Max: 37.2 (17 Aug 2023 20:48)  T(F): 97.9 (18 Aug 2023 05:37), Max: 98.9 (17 Aug 2023 20:48)  HR: 74 (18 Aug 2023 07:28) (50 - 74)  BP: 132/75 (18 Aug 2023 07:28) (120/64 - 147/75)  BP(mean): 83 (18 Aug 2023 05:37) (83 - 95)  RR: 18 (18 Aug 2023 05:37) (16 - 20)  SpO2: 97% (18 Aug 2023 05:37) (97% - 98%)    Parameters below as of 18 Aug 2023 05:37  Patient On (Oxygen Delivery Method): room air      Physical exam:  General: No acute distress, awake and alert  Eyes: Anicteric sclerae, moist conjunctivae, see below for CNs  Neck: trachea midline, FROM, supple, no thyromegaly or lymphadenopathy  Cardiovascular: Regular rate and rhythm on the monitor.  Pulmonary: . No use of accessory muscles  GI: Abdomen soft, non-distended, non-tender  Extremities: Radial and DP pulses +2, RUE hematoma from fall PTA +, w/ Dsg C/D/I. No active bleeding @ this point of time. B/L LE swelling and TTP+ with discolouration B/L shin L>R+. Chronic B/L Shin pain. LLE hematoma on posterior surface of thigh  Motor: RUE soreness due to fall but BLUE strength 5/5. Bilateral lower extremity strength 3/5 but c/o pain/weakness with movement     MEDICATIONS:  MEDICATIONS  (STANDING):  buPROPion XL (24-Hour) . 300 milliGRAM(s) Oral daily  calcium carbonate    500 mG (Tums) Chewable 1 Tablet(s) Chew daily  cholecalciferol 1000 Unit(s) Oral daily  clopidogrel Tablet 75 milliGRAM(s) Oral daily  enoxaparin Injectable 40 milliGRAM(s) SubCutaneous every 24 hours  finasteride 5 milliGRAM(s) Oral daily  gabapentin 800 milliGRAM(s) Oral two times a day  midodrine. 7.5 milliGRAM(s) Oral three times a day  polyethylene glycol 3350 17 Gram(s) Oral daily  senna 2 Tablet(s) Oral at bedtime  tamsulosin 0.4 milliGRAM(s) Oral at bedtime    MEDICATIONS  (PRN):  acetaminophen     Tablet .. 650 milliGRAM(s) Oral every 6 hours PRN Temp greater or equal to 38C (100.4F), Mild Pain (1 - 3)      ALLERGIES:  No Known Allergies      LABS:                         11.7   3.09  )-----------( 161      ( 17 Aug 2023 05:30 )             33.5     08-17    139  |  107  |  14  ----------------------------<  80  4.5   |  22  |  1.04    Ca    9.0      17 Aug 2023 05:30  Phos  2.2     08-17  Mg     2.0     08-17        Urinalysis Basic - ( 17 Aug 2023 05:30 )    Color: x / Appearance: x / SG: x / pH: x  Gluc: 80 mg/dL / Ketone: x  / Bili: x / Urobili: x   Blood: x / Protein: x / Nitrite: x   Leuk Esterase: x / RBC: x / WBC x   Sq Epi: x / Non Sq Epi: x / Bacteria: x                RADIOLOGY, EKG & ADDITIONAL TESTS: Reviewed.

## 2023-08-18 NOTE — DISCHARGE NOTE PROVIDER - NSDCFUADDAPPT_GEN_ALL_CORE_FT
Please follow up with your outpatient neurologist within 2 weeks of discharge.     Follow up with your PCP within 2 weeks of discharge.

## 2023-08-18 NOTE — PROGRESS NOTE ADULT - PROBLEM SELECTOR PLAN 3
Pt w/ Symptomatic Orthostatic Hypotension. TTE shows mildly dilated aortic root (likely needs outpt PCP f/u).    Plan  - c/w Midodrine 5mg TID, Hold for SBP > 180, increased to 7.5mg TID.  - Will need outpatient 30 day cardiac monitoring and follow up with PCP  - c/w compression stockings Pt w/ Symptomatic Orthostatic Hypotension. TTE shows mildly dilated aortic root (likely needs outpt PCP f/u).    Plan  - c/w Midodrine 7.5mg TID. Hold for SBP > 180,   - Will need outpatient 30 day cardiac monitoring and follow up with PCP  - c/w compression stockings

## 2023-08-18 NOTE — PROGRESS NOTE ADULT - ASSESSMENT
82y Male with PMHx of TIA (dx 2 years ago, did not feel any symptoms, reports his PCP does not think he had a TIA but his neurologist did so he takes asa/plavix daily), BPH, DM, diabetic neuropathy, HLD presented to ED for gait ataxia starting the same day and s/p fall. Walks with a cane at baseline and his gait was worse. Fell at 430pm, w/ probable LOC. Reports has chronic bilateral LE weakness and chronic dizziness from diabetic neuropathy. CT imaging unremarkable. Case discussed with Dr. Agustin prior to urgent intervention. Risk and benefits of tenecteplase were discussed with patient member including risk of symptomatic ICH/death. Decision was made that benefits outweighed risks and tenecteplase was administered at 1830 on 8/11, stability CTH on 8/12 w/ no bleed. MRI negative for acute stroke. Pt still symptomatic w/ dizziness with position changes and symptoms possibly 2/2 orthostatic hypotension.

## 2023-08-18 NOTE — PROGRESS NOTE ADULT - PROBLEM SELECTOR PLAN 1
Pt had extensive CVA workup after exhibiting gait ataxia i/s/o chronic dizziness. His ASA d/c'd patient has history of bleeding ulcers on ASA. Underwent q4hr stroke neuro checks and vitals. Several imaging studies performed, including - MRI Brain without contrast : Negative, Stroke Code HCT Results: negative, Stroke Code CTA Results: Small amount of calcified plaque at the cavernous segments of the bilateral internal carotid arteries. The anterior and middle cerebral arteries are patent at their 1st and 2nd order segments, and appear symmetric caliber. The posterior circulation shows no high grade stenosis or occlusion. Mildly hypoplastic right P1 segment with a prominent posterior communicating artery. Trace amount of calcified plaque in the right V4 segment. 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    Plan  - continue atorvastatin 80mg daily  - hold ASA  - Stroke education

## 2023-08-18 NOTE — DISCHARGE NOTE PROVIDER - NSDCMRMEDTOKEN_GEN_ALL_CORE_FT
atorvastatin 40 mg oral tablet: 1 orally once a day (in the morning)  buPROPion 300 mg/24 hours (XL) oral tablet, extended release: 1 orally once a day (in the morning)  calcium carbonate 1000 mg oral tablet, chewable: 2 chewed once a day (in the morning)  clopidogrel 75 mg oral tablet: 1 orally  cyanocobalamin 100 mcg/mL injectable solution: injectable  D3 25 mcg (1000 intl units) oral tablet: 1 orally  finasteride 5 mg oral tablet: 1 orally  gabapentin 800 mg oral tablet: 1 orally 2 times a day  Imodium A-D EZ Chews 2 mg oral tablet, chewable: 1 chewed  LORazepam 1 mg oral tablet: 1 orally  lutein 20 mg oral capsule: 1 orally  metFORMIN 500 mg oral tablet: 1 orally  Mounjaro 2.5 mg/0.5 mL subcutaneous solution: 2.5 subcutaneously  Silenor 6 mg oral tablet: 1 orally  tamsulosin 0.4 mg oral capsule: 1 orally   atorvastatin 40 mg oral tablet: 1 orally once a day (in the morning)  buPROPion 300 mg/24 hours (XL) oral tablet, extended release: 1 orally once a day (in the morning)  calcium carbonate 1000 mg oral tablet, chewable: 2 chewed once a day (in the morning)  clopidogrel 75 mg oral tablet: 1 tablet orally once a day  finasteride 5 mg oral tablet: 1 tablet orally once a day  gabapentin 800 mg oral tablet: 1 orally 2 times a day  lutein 20 mg oral capsule: 1 orally  metFORMIN 500 mg oral tablet: 1 tablet orally 2 times a day  midodrine 2.5 mg oral tablet: 3 tab(s) orally 3 times a day  Mounjaro 2.5 mg/0.5 mL subcutaneous solution: 2.5 solution subcutaneously once a week  polyethylene glycol 3350 oral powder for reconstitution: 17 gram(s) orally once a day  senna leaf extract oral tablet: 2 tab(s) orally once a day (at bedtime)  tamsulosin 0.4 mg oral capsule: 1 capsule orally once a day

## 2023-08-18 NOTE — DISCHARGE NOTE PROVIDER - DETAILS OF MALNUTRITION DIAGNOSIS/DIAGNOSES
This patient has been assessed with a concern for Malnutrition and was treated during this hospitalization for the following Nutrition diagnosis/diagnoses:     -  08/15/2023: Severe protein-calorie malnutrition

## 2023-08-18 NOTE — PROGRESS NOTE ADULT - REASON FOR ADMISSION
Dizziness s/p TNK

## 2023-08-18 NOTE — PROGRESS NOTE ADULT - PROVIDER SPECIALTY LIST ADULT
Hospitalist
Hospitalist
Neurology
Rehab Medicine
Neurology
Hospitalist
Hospitalist
Internal Medicine
Internal Medicine
Neurology
Rehab Medicine
Rehab Medicine
Hospitalist

## 2023-08-18 NOTE — PROGRESS NOTE ADULT - SUBJECTIVE AND OBJECTIVE BOX
Physical Medicine and Rehabilitation Progress Note :       Patient is a 82y old  Male who presents with a chief complaint of Dizziness s/p TNK (18 Aug 2023 09:03)      HPI:  82y Male with PMHx of TIA (dx 2 years ago, did not feel any symptoms, reports his PCP does not think he had a TIA but his neurologist did so he takes asa/plavix daily), BPH, type 1 DM, diabetic neuropathy, HLD presents to ED for gait ataxia starting 1530 (8/11) after having lunch. Walks with a cane at baseline and his gait was worse. Fell at 430pm, no LOC. Reports has chronic bilateral LE weakness and chronic dizziness. CT imaging unremarkable. Decision was made that benefits outweighed risks and tenecteplase was administered at 1830.      (11 Aug 2023 20:25)                            11.8   3.43  )-----------( 181      ( 18 Aug 2023 05:30 )             33.1       08-18    140  |  107  |  16  ----------------------------<  86  4.1   |  24  |  0.99    Ca    9.0      18 Aug 2023 05:30  Phos  2.7     08-18  Mg     1.9     08-18      Vital Signs Last 24 Hrs  T(C): 36.6 (18 Aug 2023 05:37), Max: 37.2 (17 Aug 2023 20:48)  T(F): 97.9 (18 Aug 2023 05:37), Max: 98.9 (17 Aug 2023 20:48)  HR: 74 (18 Aug 2023 07:28) (50 - 74)  BP: 132/75 (18 Aug 2023 07:28) (120/64 - 147/75)  BP(mean): 83 (18 Aug 2023 05:37) (83 - 94)  RR: 18 (18 Aug 2023 05:37) (16 - 18)  SpO2: 97% (18 Aug 2023 05:37) (97% - 98%)    Parameters below as of 18 Aug 2023 05:37  Patient On (Oxygen Delivery Method): room air        MEDICATIONS  (STANDING):  buPROPion XL (24-Hour) . 300 milliGRAM(s) Oral daily  calcium carbonate    500 mG (Tums) Chewable 1 Tablet(s) Chew daily  cholecalciferol 1000 Unit(s) Oral daily  clopidogrel Tablet 75 milliGRAM(s) Oral daily  enoxaparin Injectable 40 milliGRAM(s) SubCutaneous every 24 hours  finasteride 5 milliGRAM(s) Oral daily  gabapentin 800 milliGRAM(s) Oral two times a day  midodrine. 7.5 milliGRAM(s) Oral three times a day  polyethylene glycol 3350 17 Gram(s) Oral daily  senna 2 Tablet(s) Oral at bedtime  tamsulosin 0.4 milliGRAM(s) Oral at bedtime    MEDICATIONS  (PRN):  acetaminophen     Tablet .. 650 milliGRAM(s) Oral every 6 hours PRN Temp greater or equal to 38C (100.4F), Mild Pain (1 - 3)         Functional Status Assessment :   8/17/2023    Pain Assessment/Number Scale (0-10) Adult  Presence of Pain: denies pain/discomfort (Rating = 0)  Pain Rating (0-10): Rest: 0 (no pain/absence of nonverbal indicators of pain)  Pain Rating (0-10): Activity: 0 (no pain/absence of nonverbal indicators of pain)  Comfort/Acceptable Pain Level (0-10): 0     Safety      AM-PAC Functional Assessment: Daily Activity  Type of Assessment: Daily assessment  Putting on and taking off regular lower body clothing?: 3 = A little assistance  Bathing (including washing, rinsing, drying)?: 2 = A lot of assistance  Toileting, which includes using toilet, bedpan or urinal?: 3 = A little assistance  Putting on and taking off regular upper body clothing?: 3 = A little assistance  Take care of personal grooming such as brushing teeth?: 4 = No assist / stand by assistance  Eating meals?: 4 = No assist / stand by assistance  Score: 19   Row Comment: Ask the patient "How much help from another person do you currently need? (If the patient hasn't done an activity recently, how much help from another person do you think he/she needs if he/she tried?)    Cognitive/Neuro      Cognitive/Neuro/Behavioral  Cognitive/Neuro/Behavioral [WDL Definition: Alert; opens eyes spontaneously; arouses to voice or touch; oriented x 4; follows commands; speech spontaneous, logical; purposeful motor response; behavior appropriate to situation]: WDL    Language Assistance  Preferred Language to Address Healthcare Preferred Language to Address Healthcare: English    Therapeutic Interventions      Bed Mobility  Bed Mobility Training Symptoms Noted During/After Treatment: fatigue  Bed Mobility Training Rolling/Turning: supervsion;  supervision  Bed Mobility Training Scooting: supervsion;  supervision  Bed Mobility Training Sit-to-Supine: supervsion;  supervision  Bed Mobility Training Supine-to-Sit: supervsion;  supervision  Bed Mobility Training Limitations: decreased strength;  impaired balance    Sit-Stand Transfer Training  Transfer Training Sit-to-Stand Transfer: contact guard;  1 person assist;  verbal cues;  rolling walker  Transfer Training Stand-to-Sit Transfer: contact guard;  1 person assist;  verbal cues;  rolling walker  Sit-to-Stand Transfer Training Transfer Safety Analysis: decreased balance;  decreased sequencing ability;  decreased strength;  impaired balance;  impaired coordination;  rolling walker    Therapeutic Exercise  Therapeutic Exercise Charges: EOB sitting/dangling of LEs ~10 mins (Fair+/fair grade S/D)   Therapeutic Exercise Detail: Pt ambulated ~30 ft  (unsteady on feet) w/ seated rest periods x2 2/2 c/o dizziness     Lower Body Dressing Training  Lower Body Dressing Training Assistance: supervsion;  supervision;  verbal cues;  to don, doff b/l socks w/ comp;  decreased flexibility;  decreased strength;  impaired balance;  impaired coordination    Upper Body Dressing Training  Upper Body Dressing Training Assistance: supervsion;  supervision;  verbal cues              PM&R Impression : as above    Current Disposition Plan Recommendations :    subacute rehab placement

## 2023-08-18 NOTE — PROGRESS NOTE ADULT - PROBLEM SELECTOR PLAN 7
F: N/A  E: replete as needed  N: Consistent Carb  DVT Prophylaxis: SCDs  Code Status: Full Code    Dispo: [facility]
F: N/A  E: replete as needed  N: Consistent Carb  DVT Prophylaxis: SCDs  Code Status: Full Code    Dispo: [facility]

## 2023-08-27 DIAGNOSIS — Z79.82 LONG TERM (CURRENT) USE OF ASPIRIN: ICD-10-CM

## 2023-08-27 DIAGNOSIS — I10 ESSENTIAL (PRIMARY) HYPERTENSION: ICD-10-CM

## 2023-08-27 DIAGNOSIS — Z86.73 PERSONAL HISTORY OF TRANSIENT ISCHEMIC ATTACK (TIA), AND CEREBRAL INFARCTION WITHOUT RESIDUAL DEFICITS: ICD-10-CM

## 2023-08-27 DIAGNOSIS — S40.021A CONTUSION OF RIGHT UPPER ARM, INITIAL ENCOUNTER: ICD-10-CM

## 2023-08-27 DIAGNOSIS — I65.23 OCCLUSION AND STENOSIS OF BILATERAL CAROTID ARTERIES: ICD-10-CM

## 2023-08-27 DIAGNOSIS — I95.1 ORTHOSTATIC HYPOTENSION: ICD-10-CM

## 2023-08-27 DIAGNOSIS — Z79.02 LONG TERM (CURRENT) USE OF ANTITHROMBOTICS/ANTIPLATELETS: ICD-10-CM

## 2023-08-27 DIAGNOSIS — E10.40 TYPE 1 DIABETES MELLITUS WITH DIABETIC NEUROPATHY, UNSPECIFIED: ICD-10-CM

## 2023-08-27 DIAGNOSIS — D69.6 THROMBOCYTOPENIA, UNSPECIFIED: ICD-10-CM

## 2023-08-27 DIAGNOSIS — I67.2 CEREBRAL ATHEROSCLEROSIS: ICD-10-CM

## 2023-08-27 DIAGNOSIS — Z79.84 LONG TERM (CURRENT) USE OF ORAL HYPOGLYCEMIC DRUGS: ICD-10-CM

## 2023-08-27 DIAGNOSIS — E43 UNSPECIFIED SEVERE PROTEIN-CALORIE MALNUTRITION: ICD-10-CM
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
## 2023-08-27 DIAGNOSIS — Z91.81 HISTORY OF FALLING: ICD-10-CM

## 2023-08-27 DIAGNOSIS — N40.0 BENIGN PROSTATIC HYPERPLASIA WITHOUT LOWER URINARY TRACT SYMPTOMS: ICD-10-CM

## 2023-08-27 DIAGNOSIS — Q27.8 OTHER SPECIFIED CONGENITAL MALFORMATIONS OF PERIPHERAL VASCULAR SYSTEM: ICD-10-CM

## 2023-08-27 DIAGNOSIS — R42 DIZZINESS AND GIDDINESS: ICD-10-CM

## 2023-08-27 DIAGNOSIS — E78.5 HYPERLIPIDEMIA, UNSPECIFIED: ICD-10-CM

## 2023-08-27 DIAGNOSIS — R29.898 OTHER SYMPTOMS AND SIGNS INVOLVING THE MUSCULOSKELETAL SYSTEM: ICD-10-CM

## 2023-08-27 DIAGNOSIS — D64.9 ANEMIA, UNSPECIFIED: ICD-10-CM
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
## 2024-01-26 ENCOUNTER — APPOINTMENT (OUTPATIENT)
Dept: OPHTHALMOLOGY | Facility: CLINIC | Age: 84
End: 2024-01-26

## 2024-05-09 ENCOUNTER — RESULT REVIEW (OUTPATIENT)
Age: 84
End: 2024-05-09

## 2024-05-09 ENCOUNTER — INPATIENT (INPATIENT)
Facility: HOSPITAL | Age: 84
LOS: 0 days | Discharge: HOME HEALTH | End: 2024-05-10
Attending: HOSPITALIST | Admitting: STUDENT IN AN ORGANIZED HEALTH CARE EDUCATION/TRAINING PROGRAM
Payer: MEDICARE

## 2024-05-09 VITALS
TEMPERATURE: 98 F | HEIGHT: 69 IN | OXYGEN SATURATION: 98 % | HEART RATE: 65 BPM | DIASTOLIC BLOOD PRESSURE: 75 MMHG | WEIGHT: 162.92 LBS | SYSTOLIC BLOOD PRESSURE: 159 MMHG | RESPIRATION RATE: 18 BRPM

## 2024-05-09 DIAGNOSIS — Z29.9 ENCOUNTER FOR PROPHYLACTIC MEASURES, UNSPECIFIED: ICD-10-CM
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
## 2024-05-09 DIAGNOSIS — E83.39 OTHER DISORDERS OF PHOSPHORUS METABOLISM: ICD-10-CM

## 2024-05-09 DIAGNOSIS — F41.9 ANXIETY DISORDER, UNSPECIFIED: ICD-10-CM

## 2024-05-09 DIAGNOSIS — G45.9 TRANSIENT CEREBRAL ISCHEMIC ATTACK, UNSPECIFIED: ICD-10-CM

## 2024-05-09 DIAGNOSIS — Z86.73 PERSONAL HISTORY OF TRANSIENT ISCHEMIC ATTACK (TIA), AND CEREBRAL INFARCTION WITHOUT RESIDUAL DEFICITS: ICD-10-CM

## 2024-05-09 DIAGNOSIS — N17.9 ACUTE KIDNEY FAILURE, UNSPECIFIED: ICD-10-CM

## 2024-05-09 DIAGNOSIS — R60.0 LOCALIZED EDEMA: ICD-10-CM

## 2024-05-09 DIAGNOSIS — Z87.438 PERSONAL HISTORY OF OTHER DISEASES OF MALE GENITAL ORGANS: ICD-10-CM

## 2024-05-09 DIAGNOSIS — E11.9 TYPE 2 DIABETES MELLITUS WITHOUT COMPLICATIONS: ICD-10-CM

## 2024-05-09 DIAGNOSIS — D64.9 ANEMIA, UNSPECIFIED: ICD-10-CM

## 2024-05-09 PROBLEM — I10 ESSENTIAL (PRIMARY) HYPERTENSION: Chronic | Status: ACTIVE | Noted: 2023-08-11

## 2024-05-09 PROBLEM — Z98.84 BARIATRIC SURGERY STATUS: Chronic | Status: ACTIVE | Noted: 2023-08-18

## 2024-05-09 PROBLEM — E78.5 HYPERLIPIDEMIA, UNSPECIFIED: Chronic | Status: ACTIVE | Noted: 2023-08-11

## 2024-05-09 LAB
ADD ON TEST-SPECIMEN IN LAB: SIGNIFICANT CHANGE UP
ALBUMIN SERPL ELPH-MCNC: 3.6 G/DL — SIGNIFICANT CHANGE UP (ref 3.3–5)
ALBUMIN SERPL ELPH-MCNC: 3.6 G/DL — SIGNIFICANT CHANGE UP (ref 3.3–5)
ALP SERPL-CCNC: 84 U/L — SIGNIFICANT CHANGE UP (ref 40–120)
ALP SERPL-CCNC: 93 U/L — SIGNIFICANT CHANGE UP (ref 40–120)
ALT FLD-CCNC: 8 U/L — LOW (ref 10–45)
ALT FLD-CCNC: 9 U/L — LOW (ref 10–45)
AMPHET UR-MCNC: NEGATIVE — SIGNIFICANT CHANGE UP
ANION GAP SERPL CALC-SCNC: 13 MMOL/L — SIGNIFICANT CHANGE UP (ref 5–17)
ANION GAP SERPL CALC-SCNC: 14 MMOL/L — SIGNIFICANT CHANGE UP (ref 5–17)
APPEARANCE UR: CLEAR — SIGNIFICANT CHANGE UP
APPEARANCE UR: CLEAR — SIGNIFICANT CHANGE UP
AST SERPL-CCNC: 21 U/L — SIGNIFICANT CHANGE UP (ref 10–40)
AST SERPL-CCNC: 23 U/L — SIGNIFICANT CHANGE UP (ref 10–40)
BACTERIA # UR AUTO: NEGATIVE /HPF — SIGNIFICANT CHANGE UP
BACTERIA # UR AUTO: NEGATIVE /HPF — SIGNIFICANT CHANGE UP
BARBITURATES UR SCN-MCNC: NEGATIVE — SIGNIFICANT CHANGE UP
BASOPHILS # BLD AUTO: 0.02 K/UL — SIGNIFICANT CHANGE UP (ref 0–0.2)
BASOPHILS NFR BLD AUTO: 0.2 % — SIGNIFICANT CHANGE UP (ref 0–2)
BENZODIAZ UR-MCNC: NEGATIVE — SIGNIFICANT CHANGE UP
BILIRUB DIRECT SERPL-MCNC: <0.2 MG/DL — SIGNIFICANT CHANGE UP (ref 0–0.3)
BILIRUB INDIRECT FLD-MCNC: SIGNIFICANT CHANGE UP MG/DL (ref 0.2–1)
BILIRUB SERPL-MCNC: 0.8 MG/DL — SIGNIFICANT CHANGE UP (ref 0.2–1.2)
BILIRUB SERPL-MCNC: 0.8 MG/DL — SIGNIFICANT CHANGE UP (ref 0.2–1.2)
BILIRUB UR-MCNC: NEGATIVE — SIGNIFICANT CHANGE UP
BILIRUB UR-MCNC: NEGATIVE — SIGNIFICANT CHANGE UP
BUN SERPL-MCNC: 16 MG/DL — SIGNIFICANT CHANGE UP (ref 7–23)
BUN SERPL-MCNC: 19 MG/DL — SIGNIFICANT CHANGE UP (ref 7–23)
CALCIUM SERPL-MCNC: 9.4 MG/DL — SIGNIFICANT CHANGE UP (ref 8.4–10.5)
CALCIUM SERPL-MCNC: 9.5 MG/DL — SIGNIFICANT CHANGE UP (ref 8.4–10.5)
CAST: 0 /LPF — SIGNIFICANT CHANGE UP (ref 0–4)
CAST: 1 /LPF — SIGNIFICANT CHANGE UP (ref 0–4)
CHLORIDE SERPL-SCNC: 101 MMOL/L — SIGNIFICANT CHANGE UP (ref 96–108)
CHLORIDE SERPL-SCNC: 102 MMOL/L — SIGNIFICANT CHANGE UP (ref 96–108)
CK MB CFR SERPL CALC: 2.8 NG/ML — SIGNIFICANT CHANGE UP (ref 0–6.7)
CK MB CFR SERPL CALC: 2.8 NG/ML — SIGNIFICANT CHANGE UP (ref 0–6.7)
CK SERPL-CCNC: 499 U/L — HIGH (ref 30–200)
CK SERPL-CCNC: 518 U/L — HIGH (ref 30–200)
CK SERPL-CCNC: 532 U/L — HIGH (ref 30–200)
CO2 SERPL-SCNC: 23 MMOL/L — SIGNIFICANT CHANGE UP (ref 22–31)
CO2 SERPL-SCNC: 24 MMOL/L — SIGNIFICANT CHANGE UP (ref 22–31)
COCAINE METAB.OTHER UR-MCNC: NEGATIVE — SIGNIFICANT CHANGE UP
COLOR SPEC: YELLOW — SIGNIFICANT CHANGE UP
COLOR SPEC: YELLOW — SIGNIFICANT CHANGE UP
CREAT ?TM UR-MCNC: 96 MG/DL — SIGNIFICANT CHANGE UP
CREAT SERPL-MCNC: 1.45 MG/DL — HIGH (ref 0.5–1.3)
CREAT SERPL-MCNC: 1.5 MG/DL — HIGH (ref 0.5–1.3)
DIFF PNL FLD: ABNORMAL
DIFF PNL FLD: NEGATIVE — SIGNIFICANT CHANGE UP
EGFR: 46 ML/MIN/1.73M2 — LOW
EGFR: 48 ML/MIN/1.73M2 — LOW
EOSINOPHIL # BLD AUTO: 0.04 K/UL — SIGNIFICANT CHANGE UP (ref 0–0.5)
EOSINOPHIL NFR BLD AUTO: 0.5 % — SIGNIFICANT CHANGE UP (ref 0–6)
GLUCOSE BLDC GLUCOMTR-MCNC: 103 MG/DL — HIGH (ref 70–99)
GLUCOSE BLDC GLUCOMTR-MCNC: 89 MG/DL — SIGNIFICANT CHANGE UP (ref 70–99)
GLUCOSE BLDC GLUCOMTR-MCNC: 99 MG/DL — SIGNIFICANT CHANGE UP (ref 70–99)
GLUCOSE SERPL-MCNC: 101 MG/DL — HIGH (ref 70–99)
GLUCOSE SERPL-MCNC: 121 MG/DL — HIGH (ref 70–99)
GLUCOSE UR QL: NEGATIVE MG/DL — SIGNIFICANT CHANGE UP
GLUCOSE UR QL: NEGATIVE MG/DL — SIGNIFICANT CHANGE UP
HCT VFR BLD CALC: 36 % — LOW (ref 39–50)
HGB BLD-MCNC: 12.6 G/DL — LOW (ref 13–17)
IMM GRANULOCYTES NFR BLD AUTO: 0.3 % — SIGNIFICANT CHANGE UP (ref 0–0.9)
IRON SATN MFR SERPL: 19 % — SIGNIFICANT CHANGE UP (ref 16–55)
IRON SATN MFR SERPL: 51 UG/DL — SIGNIFICANT CHANGE UP (ref 45–165)
KETONES UR-MCNC: NEGATIVE MG/DL — SIGNIFICANT CHANGE UP
KETONES UR-MCNC: NEGATIVE MG/DL — SIGNIFICANT CHANGE UP
LEUKOCYTE ESTERASE UR-ACNC: ABNORMAL
LEUKOCYTE ESTERASE UR-ACNC: NEGATIVE — SIGNIFICANT CHANGE UP
LYMPHOCYTES # BLD AUTO: 1.1 K/UL — SIGNIFICANT CHANGE UP (ref 1–3.3)
LYMPHOCYTES # BLD AUTO: 12.7 % — LOW (ref 13–44)
MAGNESIUM SERPL-MCNC: 1.8 MG/DL — SIGNIFICANT CHANGE UP (ref 1.6–2.6)
MAGNESIUM SERPL-MCNC: 1.9 MG/DL — SIGNIFICANT CHANGE UP (ref 1.6–2.6)
MCHC RBC-ENTMCNC: 31.3 PG — SIGNIFICANT CHANGE UP (ref 27–34)
MCHC RBC-ENTMCNC: 35 GM/DL — SIGNIFICANT CHANGE UP (ref 32–36)
MCV RBC AUTO: 89.6 FL — SIGNIFICANT CHANGE UP (ref 80–100)
METHADONE UR-MCNC: NEGATIVE — SIGNIFICANT CHANGE UP
MONOCYTES # BLD AUTO: 0.74 K/UL — SIGNIFICANT CHANGE UP (ref 0–0.9)
MONOCYTES NFR BLD AUTO: 8.6 % — SIGNIFICANT CHANGE UP (ref 2–14)
NEUTROPHILS # BLD AUTO: 6.72 K/UL — SIGNIFICANT CHANGE UP (ref 1.8–7.4)
NEUTROPHILS NFR BLD AUTO: 77.7 % — HIGH (ref 43–77)
NITRITE UR-MCNC: NEGATIVE — SIGNIFICANT CHANGE UP
NITRITE UR-MCNC: NEGATIVE — SIGNIFICANT CHANGE UP
NRBC # BLD: 0 /100 WBCS — SIGNIFICANT CHANGE UP (ref 0–0)
OPIATES UR-MCNC: NEGATIVE — SIGNIFICANT CHANGE UP
OSMOLALITY UR: 473 MOSM/KG — SIGNIFICANT CHANGE UP (ref 300–900)
PCP SPEC-MCNC: SIGNIFICANT CHANGE UP
PCP UR-MCNC: NEGATIVE — SIGNIFICANT CHANGE UP
PH UR: 7.5 — SIGNIFICANT CHANGE UP (ref 5–8)
PH UR: 7.5 — SIGNIFICANT CHANGE UP (ref 5–8)
PHOSPHATE SERPL-MCNC: 1 MG/DL — CRITICAL LOW (ref 2.5–4.5)
PHOSPHATE SERPL-MCNC: 3.5 MG/DL — SIGNIFICANT CHANGE UP (ref 2.5–4.5)
PLATELET # BLD AUTO: 143 K/UL — LOW (ref 150–400)
POTASSIUM SERPL-MCNC: 3.5 MMOL/L — SIGNIFICANT CHANGE UP (ref 3.5–5.3)
POTASSIUM SERPL-MCNC: 3.7 MMOL/L — SIGNIFICANT CHANGE UP (ref 3.5–5.3)
POTASSIUM SERPL-SCNC: 3.5 MMOL/L — SIGNIFICANT CHANGE UP (ref 3.5–5.3)
POTASSIUM SERPL-SCNC: 3.7 MMOL/L — SIGNIFICANT CHANGE UP (ref 3.5–5.3)
PROT ?TM UR-MCNC: 11 MG/DL — SIGNIFICANT CHANGE UP (ref 0–12)
PROT SERPL-MCNC: 6.3 G/DL — SIGNIFICANT CHANGE UP (ref 6–8.3)
PROT SERPL-MCNC: 6.4 G/DL — SIGNIFICANT CHANGE UP (ref 6–8.3)
PROT UR-MCNC: NEGATIVE MG/DL — SIGNIFICANT CHANGE UP
PROT UR-MCNC: NEGATIVE MG/DL — SIGNIFICANT CHANGE UP
PROT/CREAT UR-RTO: 0.1 RATIO — SIGNIFICANT CHANGE UP (ref 0–0.2)
RBC # BLD: 4.02 M/UL — LOW (ref 4.2–5.8)
RBC # FLD: 12.4 % — SIGNIFICANT CHANGE UP (ref 10.3–14.5)
RBC CASTS # UR COMP ASSIST: 15 /HPF — HIGH (ref 0–4)
RBC CASTS # UR COMP ASSIST: 3 /HPF — SIGNIFICANT CHANGE UP (ref 0–4)
RETICS #: 54.1 K/UL — SIGNIFICANT CHANGE UP (ref 25–125)
RETICS/RBC NFR: 1.4 % — SIGNIFICANT CHANGE UP (ref 0.5–2.5)
SALICYLATES SERPL-MCNC: <0.3 MG/DL — LOW (ref 2.8–20)
SODIUM SERPL-SCNC: 138 MMOL/L — SIGNIFICANT CHANGE UP (ref 135–145)
SODIUM SERPL-SCNC: 139 MMOL/L — SIGNIFICANT CHANGE UP (ref 135–145)
SODIUM UR-SCNC: 144 MMOL/L — SIGNIFICANT CHANGE UP
SP GR SPEC: 1.01 — SIGNIFICANT CHANGE UP (ref 1–1.03)
SP GR SPEC: 1.01 — SIGNIFICANT CHANGE UP (ref 1–1.03)
SQUAMOUS # UR AUTO: 0 /HPF — SIGNIFICANT CHANGE UP (ref 0–5)
SQUAMOUS # UR AUTO: 0 /HPF — SIGNIFICANT CHANGE UP (ref 0–5)
THC UR QL: NEGATIVE — SIGNIFICANT CHANGE UP
TIBC SERPL-MCNC: 274 UG/DL — SIGNIFICANT CHANGE UP (ref 220–430)
TROPONIN T, HIGH SENSITIVITY RESULT: 16 NG/L — SIGNIFICANT CHANGE UP (ref 0–51)
TROPONIN T, HIGH SENSITIVITY RESULT: 25 NG/L — SIGNIFICANT CHANGE UP (ref 0–51)
UIBC SERPL-MCNC: 223 UG/DL — SIGNIFICANT CHANGE UP (ref 110–370)
UROBILINOGEN FLD QL: 1 MG/DL — SIGNIFICANT CHANGE UP (ref 0.2–1)
UROBILINOGEN FLD QL: 1 MG/DL — SIGNIFICANT CHANGE UP (ref 0.2–1)
UUN UR-MCNC: 374 MG/DL — SIGNIFICANT CHANGE UP
WBC # BLD: 8.65 K/UL — SIGNIFICANT CHANGE UP (ref 3.8–10.5)
WBC # FLD AUTO: 8.65 K/UL — SIGNIFICANT CHANGE UP (ref 3.8–10.5)
WBC UR QL: 0 /HPF — SIGNIFICANT CHANGE UP (ref 0–5)
WBC UR QL: 4 /HPF — SIGNIFICANT CHANGE UP (ref 0–5)

## 2024-05-09 PROCEDURE — 93010 ELECTROCARDIOGRAM REPORT: CPT | Mod: 77

## 2024-05-09 PROCEDURE — 72125 CT NECK SPINE W/O DYE: CPT | Mod: 26,MC

## 2024-05-09 PROCEDURE — 93010 ELECTROCARDIOGRAM REPORT: CPT

## 2024-05-09 PROCEDURE — 70450 CT HEAD/BRAIN W/O DYE: CPT | Mod: 26,MC

## 2024-05-09 PROCEDURE — 93306 TTE W/DOPPLER COMPLETE: CPT | Mod: 26

## 2024-05-09 PROCEDURE — 99223 1ST HOSP IP/OBS HIGH 75: CPT | Mod: GC

## 2024-05-09 PROCEDURE — 99285 EMERGENCY DEPT VISIT HI MDM: CPT | Mod: FS

## 2024-05-09 RX ORDER — SODIUM,POTASSIUM PHOSPHATES 278-250MG
2 POWDER IN PACKET (EA) ORAL ONCE
Refills: 0 | Status: COMPLETED | OUTPATIENT
Start: 2024-05-09 | End: 2024-05-09

## 2024-05-09 RX ORDER — CHOLECALCIFEROL (VITAMIN D3) 125 MCG
800 CAPSULE ORAL DAILY
Refills: 0 | Status: DISCONTINUED | OUTPATIENT
Start: 2024-05-09 | End: 2024-05-10

## 2024-05-09 RX ORDER — SODIUM CHLORIDE 9 MG/ML
1000 INJECTION, SOLUTION INTRAVENOUS
Refills: 0 | Status: DISCONTINUED | OUTPATIENT
Start: 2024-05-09 | End: 2024-05-10

## 2024-05-09 RX ORDER — ENOXAPARIN SODIUM 100 MG/ML
40 INJECTION SUBCUTANEOUS EVERY 24 HOURS
Refills: 0 | Status: DISCONTINUED | OUTPATIENT
Start: 2024-05-09 | End: 2024-05-10

## 2024-05-09 RX ORDER — GLUCAGON INJECTION, SOLUTION 0.5 MG/.1ML
1 INJECTION, SOLUTION SUBCUTANEOUS ONCE
Refills: 0 | Status: DISCONTINUED | OUTPATIENT
Start: 2024-05-09 | End: 2024-05-10

## 2024-05-09 RX ORDER — CLOPIDOGREL BISULFATE 75 MG/1
75 TABLET, FILM COATED ORAL EVERY 24 HOURS
Refills: 0 | Status: DISCONTINUED | OUTPATIENT
Start: 2024-05-09 | End: 2024-05-10

## 2024-05-09 RX ORDER — MAGNESIUM SULFATE 500 MG/ML
2 VIAL (ML) INJECTION ONCE
Refills: 0 | Status: COMPLETED | OUTPATIENT
Start: 2024-05-09 | End: 2024-05-09

## 2024-05-09 RX ORDER — POTASSIUM PHOSPHATE, MONOBASIC POTASSIUM PHOSPHATE, DIBASIC 236; 224 MG/ML; MG/ML
30 INJECTION, SOLUTION INTRAVENOUS ONCE
Refills: 0 | Status: COMPLETED | OUTPATIENT
Start: 2024-05-09 | End: 2024-05-09

## 2024-05-09 RX ORDER — ATORVASTATIN CALCIUM 80 MG/1
40 TABLET, FILM COATED ORAL AT BEDTIME
Refills: 0 | Status: DISCONTINUED | OUTPATIENT
Start: 2024-05-09 | End: 2024-05-10

## 2024-05-09 RX ORDER — INSULIN LISPRO 100/ML
VIAL (ML) SUBCUTANEOUS
Refills: 0 | Status: DISCONTINUED | OUTPATIENT
Start: 2024-05-09 | End: 2024-05-10

## 2024-05-09 RX ORDER — DEXTROSE 50 % IN WATER 50 %
12.5 SYRINGE (ML) INTRAVENOUS ONCE
Refills: 0 | Status: DISCONTINUED | OUTPATIENT
Start: 2024-05-09 | End: 2024-05-10

## 2024-05-09 RX ORDER — LOPERAMIDE HCL 2 MG
10 TABLET ORAL
Refills: 0 | DISCHARGE

## 2024-05-09 RX ORDER — POTASSIUM CHLORIDE 20 MEQ
40 PACKET (EA) ORAL ONCE
Refills: 0 | Status: COMPLETED | OUTPATIENT
Start: 2024-05-09 | End: 2024-05-09

## 2024-05-09 RX ORDER — DEXTROSE 50 % IN WATER 50 %
15 SYRINGE (ML) INTRAVENOUS ONCE
Refills: 0 | Status: DISCONTINUED | OUTPATIENT
Start: 2024-05-09 | End: 2024-05-10

## 2024-05-09 RX ORDER — PREGABALIN 225 MG/1
1000 CAPSULE ORAL
Refills: 0 | DISCHARGE

## 2024-05-09 RX ORDER — SODIUM CHLORIDE 9 MG/ML
1000 INJECTION INTRAMUSCULAR; INTRAVENOUS; SUBCUTANEOUS ONCE
Refills: 0 | Status: COMPLETED | OUTPATIENT
Start: 2024-05-09 | End: 2024-05-09

## 2024-05-09 RX ORDER — MIDODRINE HYDROCHLORIDE 2.5 MG/1
2.5 TABLET ORAL EVERY 8 HOURS
Refills: 0 | Status: DISCONTINUED | OUTPATIENT
Start: 2024-05-09 | End: 2024-05-10

## 2024-05-09 RX ORDER — DEXTROSE 50 % IN WATER 50 %
25 SYRINGE (ML) INTRAVENOUS ONCE
Refills: 0 | Status: DISCONTINUED | OUTPATIENT
Start: 2024-05-09 | End: 2024-05-10

## 2024-05-09 RX ORDER — DEXTROSE 10 % IN WATER 10 %
125 INTRAVENOUS SOLUTION INTRAVENOUS ONCE
Refills: 0 | Status: DISCONTINUED | OUTPATIENT
Start: 2024-05-09 | End: 2024-05-10

## 2024-05-09 RX ORDER — PREGABALIN 225 MG/1
1000 CAPSULE ORAL DAILY
Refills: 0 | Status: DISCONTINUED | OUTPATIENT
Start: 2024-05-09 | End: 2024-05-10

## 2024-05-09 RX ADMIN — Medication 800 UNIT(S): at 11:11

## 2024-05-09 RX ADMIN — Medication 40 MILLIEQUIVALENT(S): at 21:51

## 2024-05-09 RX ADMIN — MIDODRINE HYDROCHLORIDE 2.5 MILLIGRAM(S): 2.5 TABLET ORAL at 21:50

## 2024-05-09 RX ADMIN — CLOPIDOGREL BISULFATE 75 MILLIGRAM(S): 75 TABLET, FILM COATED ORAL at 13:03

## 2024-05-09 RX ADMIN — SODIUM CHLORIDE 1000 MILLILITER(S): 9 INJECTION INTRAMUSCULAR; INTRAVENOUS; SUBCUTANEOUS at 05:57

## 2024-05-09 RX ADMIN — PREGABALIN 1000 MICROGRAM(S): 225 CAPSULE ORAL at 13:03

## 2024-05-09 RX ADMIN — Medication 1 MILLIGRAM(S): at 21:50

## 2024-05-09 RX ADMIN — MIDODRINE HYDROCHLORIDE 2.5 MILLIGRAM(S): 2.5 TABLET ORAL at 14:36

## 2024-05-09 RX ADMIN — Medication 2 PACKET(S): at 19:56

## 2024-05-09 RX ADMIN — ATORVASTATIN CALCIUM 40 MILLIGRAM(S): 80 TABLET, FILM COATED ORAL at 21:51

## 2024-05-09 RX ADMIN — Medication 2 TABLET(S): at 10:55

## 2024-05-09 RX ADMIN — POTASSIUM PHOSPHATE, MONOBASIC POTASSIUM PHOSPHATE, DIBASIC 83.33 MILLIMOLE(S): 236; 224 INJECTION, SOLUTION INTRAVENOUS at 11:11

## 2024-05-09 RX ADMIN — ENOXAPARIN SODIUM 40 MILLIGRAM(S): 100 INJECTION SUBCUTANEOUS at 13:02

## 2024-05-09 NOTE — H&P ADULT - NSHPLABSRESULTS_GEN_ALL_CORE
.  LABS:                         12.6   8.65  )-----------( 143      ( 09 May 2024 03:52 )             36.0     05-    138  |  101  |  19  ----------------------------<  101<H>  3.5   |  23  |  1.50<H>    Ca    9.5      09 May 2024 03:52  Phos  1.0     05-  Mg     1.9     05-        Urinalysis Basic - ( 09 May 2024 03:52 )    Color: Yellow / Appearance: Clear / S.010 / pH: x  Gluc: 101 mg/dL / Ketone: Negative mg/dL  / Bili: Negative / Urobili: 1.0 mg/dL   Blood: x / Protein: Negative mg/dL / Nitrite: Negative   Leuk Esterase: Trace / RBC: 3 /HPF / WBC 4 /HPF   Sq Epi: x / Non Sq Epi: 0 /HPF / Bacteria: Negative /HPF      CARDIAC MARKERS ( 09 May 2024 03:52 )  x     / x     / 532 U/L / x     / x                RADIOLOGY, EKG & ADDITIONAL TESTS: Reviewed.

## 2024-05-09 NOTE — ED PROVIDER NOTE - CARE PLAN
Principal Discharge DX:	Frequent falls  Secondary Diagnosis:	JOEL (acute kidney injury)  Secondary Diagnosis:	Dehydration   1

## 2024-05-09 NOTE — H&P ADULT - TIME BILLING

## 2024-05-09 NOTE — ED PROVIDER NOTE - MUSCULOSKELETAL, MLM
Spine and all extremities appears normal, range of motion is not limited, no muscle or joint tenderness, NROM to b/l hip joints, b/l LE edema( chronic as per pt) Vascular stents/Clips

## 2024-05-09 NOTE — H&P ADULT - PROBLEM SELECTOR PLAN 8
Home med: Metformin 500 BID and Munjaro injections    Plan:   m[facility]  f/u [facility]    #Diabetic Neuropathy   Home med: Gabapentin 800 BID    Plan:   -Hold gabapentin given temitope Home med: Metformin 500 BID and Munjaro injections  A1c was 8.0     Plan:   -m[facility]  -f/u [facility]  -outpatient PCP collateral regarding DM regimen given A1c 5     #Diabetic Neuropathy   Home med: Gabapentin 800 BID    Plan:   -Hold gabapentin given temitope

## 2024-05-09 NOTE — H&P ADULT - PROBLEM SELECTOR PLAN 2
Bilateral LE edema with chronic skin changes, has been ongoing for years per the patient with pain on palpation diffusely   TTE from august 2023: TTE from Aug 2023: Normal LV and RV size and function, no signficant vavular disease, no pulm HTN, mild aortic root dilation   DDX: Likely i/s/o vascular insufficency, r/o DVT. Low concern for HF (given normal echo 1 year prior) hypoalbumenmia given no anasarca    Plan:   -f/u LE doppler  -f/u TTE  -f/u albumin level, protein/creatinine ratio

## 2024-05-09 NOTE — ED PROVIDER NOTE - OBJECTIVE STATEMENT
82 yo male with h/o DM2, TIA, BPH, peripheral neuropathy, HLD, anxiety, in the Er c/o frequent falls. Pt reports for the past 3 weeks he feels that his" body is like a jello". He is shaking and frequently unsteady, falling. Pt states yesterday he trip and fell 3 times in his apartment. Pt denies LOC, denies any dizziness, vision changes, CP, HA before and after his falls. Pt concerned that he did hit his head once yesterday and he is on Plavix.  Now c/o mild neck pain and some HA.

## 2024-05-09 NOTE — H&P ADULT - HISTORY OF PRESENT ILLNESS
82y Male with PMHx of TIA (dx 2011 years ago, did not feel any symptoms, reports his PCP did not think he had a TIA but his neurologist did so he takes plavix daily), BPH, DM, diabetic neuropathy, HLD, orthostasis who presents for evaluation of recurrernt falls for the past 3 weeks, last fall yesterday. Pt concerned that he did hit his head once yesterday and he is on Plavix.  Now c/o mild neck pain and some HA. Pt reports for the past 3 weeks he feels that his" body is like a jello". He is shaking and frequently unsteady, falling. Pt states yesterday he trip and fell 3 times in his apartment. Pt denies LOC, denies any dizziness, vision changes, CP, HA before and after his falls.   Of note patient was admitted in Aug 2023 for gait ataxia that has started suddenly. At the time reported chronic bilateral LE weakness and dizzines, imaging of head was negatice, and he was started on midodrine 7.5 TID, as symptoms were thought to be due to orthostasis. Plan was to continue with plavic monotherapy as symptoms were thought be not be realted to acute stroke and he had not tolerated aspirin in the pas due to GI bleed. He was then discharged to [facility] with plan for cardiac monioring outpatient     In the ED;   VS: T 98.4, /75, HR 65, RR 18, Spo2 98% in RA  Labs: Wbc 8.6, Hgb 12.6, MCV 89.6, RDW 12.4, Plt 143, Na 138, K 3.5, Bicarb 23, creatinine 1.5 BUN 19, Glucose 101. Mg 1.9 , UA: WBC 4, Bacteria negative, Nitirite negatve, LE trace  Imaging:  -CT cervical spine: No acute osseous abnormality of the cervical spine.  -CT head: No intracranial hemorrhage or calvarial fracture.  ECG: NSR, non-ischemic, Qtc 408  Intervnetions: NS 1L        82y Male with PMHx of TIA (dx 2011 on plavix daily), BPH, DM, diabetic neuropathy, HLD, orthostasis who presents for evaluation of recurrernt falls for the past 3 weeks, last fall yesterday.  Pt states yesterday he trip and fell 3 times in his apartment but after last time was unable to get up and called EMS, has not been able to ambulate since the fall. Reports that he his head once yesterday, did not have LOC and is now c/o mild neck pain and some HA.  He denies any prodrome symptoms, chest pain, palpitations, cheanges to vision or focal deficists. Pt reports for the past 3 weeks he feels that his" body is like a jello". He is shaking and frequently unsteady, falling. At baseline, is able to ambulate with walker. Denies fever, chills, abdominal pain, n/v, diarrhea, cough, dysuria, hematuria or hematocheiza. Of note, patient reports he had stopped taking all his medications in the last couple of weeks including the midodrine, as he does not feel like taking too many medications. However, noted he has continued taking finasteride and tamsulosin  Of note patient was admitted in Aug 2023 for gait ataxia that has started suddenly. At the time reported chronic bilateral LE weakness and dizzines, imaging of head was negatice, and he was started on midodrine 7.5 TID, as symptoms were thought to be due to orthostasis. Plan was to continue with plavic monotherapy as symptoms were thought be not be realted to acute stroke and he had not tolerated aspirin in the pas due to GI bleed. He was then discharged to [facility] with plan for cardiac monioring outpatient. Per the patient has not had cardiac monitoring.     In the ED;   VS: T 98.4, /75, HR 65, RR 18, Spo2 98% in RA  Labs: Wbc 8.6, Hgb 12.6, MCV 89.6, RDW 12.4, Plt 143, Na 138, K 3.5, Bicarb 23, creatinine 1.5 BUN 19, Glucose 101. Mg 1.9 , UA: WBC 4, Bacteria negative, Nitirite negatve, LE trace  Imaging:  -CT cervical spine: No acute osseous abnormality of the cervical spine.  -CT head: No intracranial hemorrhage or calvarial fracture.  ECG: NSR, non-ischemic, Qtc 408  Intervnetions: NS 1L    Valtrex Counseling: I discussed with the patient the risks of valacyclovir including but not limited to kidney damage, nausea, vomiting and severe allergy.  The patient understands that if the infection seems to be worsening or is not improving, they are to call.

## 2024-05-09 NOTE — PHYSICAL THERAPY INITIAL EVALUATION ADULT - PERTINENT HX OF CURRENT PROBLEM, REHAB EVAL
82y Male with PMHx of TIA (dx 2011 on plavix daily), BPH, DM, diabetic neuropathy, HLD, orthostasis who presents for evaluation of recurrernt falls for the past 3 weeks, last fall yesterday, hit his head, unable to ambulate since fall admitted for further work-up of recurrent fall and plan for safe disposition.

## 2024-05-09 NOTE — ED ADULT NURSE NOTE - NSFALLUNIVINTERV_ED_ALL_ED
Bed/Stretcher in lowest position, wheels locked, appropriate side rails in place/Call bell, personal items and telephone in reach/Instruct patient to call for assistance before getting out of bed/chair/stretcher/Non-slip footwear applied when patient is off stretcher/[place] to call system/Physically safe environment - no spills, clutter or unnecessary equipment/Purposeful proactive rounding/Room/bathroom lighting operational, light cord in reach

## 2024-05-09 NOTE — H&P ADULT - PROBLEM SELECTOR PLAN 7
Home med: Finasteride 5 and tamsuolosin 0.4 qd    Plan:   start finasteride 5  hold tamsulosin (given concern for orthstasis) Home med: Finasteride 5 and tamsuolosin 0.4 qd    Plan:   -start finasteride 5  -hold tamsulosin (given concern for orthstasis)

## 2024-05-09 NOTE — ED PROVIDER NOTE - CLINICAL SUMMARY MEDICAL DECISION MAKING FREE TEXT BOX
82 yo male with h/o DM2, TIA, BPH, peripheral neuropathy, HLD, anxiety, in the Er c/o frequent falls. Pt reports for the past 3 weeks he feels that his" body is like a jello". He is shaking and frequently unsteady, falling. Pt states yesterday he trip and fell 3 times in his apartment. Pt denies LOC, denies any dizziness, vision changes, CP, HA before and after his falls. Pt concerned that he did hit his head once yesterday and he is on Plavix.  Now c/o mild neck pain and some HA.  Pt usually ambulates with walker.    Afebrile, A&O x 4 in the ER and appears in no distress. No signs of systemic infection, no focal neuro deficits, unlikely any acute bony injury base on the exam. plan for labs to r/o dehydration/electrolyte abnormalities/anemia, head and cervical spine CT, re-evaluate. Pt most likwely will need PT eval and  eval prior to safe discharge home. 84 yo male with h/o DM2, TIA, BPH, peripheral neuropathy, HLD, anxiety, in the Er c/o frequent falls. Pt reports for the past 3 weeks he feels that his" body is like a jello". He is shaking and frequently unsteady, falling. Pt states yesterday he trip and fell 3 times in his apartment. Pt denies LOC, denies any dizziness, vision changes, CP, HA before and after his falls. Pt concerned that he did hit his head once yesterday and he is on Plavix.  Now c/o mild neck pain and some HA.  Pt usually ambulates with walker.    Afebrile, A&O x 4 in the ER and appears in no distress. No signs of systemic infection, no focal neuro deficits, unlikely any acute bony injury base on the exam. plan for labs to r/o dehydration/electrolyte abnormalities/anemia, head and cervical spine CT, re-evaluate. Pt most likely will need PT eval and  eval prior to safe discharge home.

## 2024-05-09 NOTE — H&P ADULT - PROBLEM SELECTOR PLAN 9
Home med: Bupropion 300 qd and Lorazepam 1mg qd as needed    Plan:   -start Bupropion  -Hold Lorazepam   -CIWA (for benzo withdrawal) Home med: Bupropion 300 qd and Lorazepam 1mg qd as needed  CIWA 0     Plan:   -start Bupropion  -c.w  Lorazepam 0.5 qd prn  -CIWA (for benzo withdrawal)

## 2024-05-09 NOTE — ED ADULT NURSE NOTE - OBJECTIVE STATEMENT
Pt presents to ER c/o fall x3 today, Pt A&O x3, calm and cooperative, airway patent, respirations regular, non-labored, lungs clear bilaterally to auscultation, abdomen soft non-tender, normoactive bowel sounds noted in all quadrants, strong palpable peripheral pulses noted, skin warm dry intact. Pt waiting ER provider evaluation.

## 2024-05-09 NOTE — H&P ADULT - PROBLEM SELECTOR PLAN 4
On admission: Hgb 12.6, MCV 89.6, RDW 12.4. Prior Hgb baseline ~12 from August 2023  Denies active signs of bleeding, but reports known hx of microhematuria that was reportedly worked up by urology, without plan for intervention per the patient.       Plan:   -f/u iron study, B12, folate ferritin On admission: Hgb 12.6, MCV 89.6, RDW 12.4. Prior Hgb baseline ~12 from August 2023  Denies active signs of bleeding, but reports known hx of microhematuria that was reportedly worked up by urology, without plan for intervention per the patient.       Plan:   -f/u iron study, B12, folate ferritin  -Active type and screen  -transfuse for Hgb<7

## 2024-05-09 NOTE — H&P ADULT - PROBLEM SELECTOR PLAN 1
Patient reports recurrent falls for the past 3 weeks. No clear prodromal symptoms, dizziness, chest pain, palpitation. However, notes his body feels like "jello and shaky". Also endorses paresthesias of feet ongoing for many years.  Fell 3x yesterday, described as "trip and fall", hit his head, wihout LOC, unable to ambulate since the fall. Has been taking diuretics, but not taking midodrine for the past few weeks.   TTE from Aug 2023: Normal LV and RV size and function, no signficant vavular disease, no pulm HTN, mild aortic root dilation    On PE: found to have loss of pripioception bilaterally and LE edema bilaterally  DDX: I/s/o  orthostasis vs nueropathy vs mechanical. Unlikely vasovagal (givel no prodrome), unlikely cardiac (given no chest pain, palpitations, ECG on admission NSR)     Plan:   -f/u orthostatics   -start midodrine 2.5 TID  -f/u A1c, B12, TSH, syphilis   -f/u trop, ck, ckmb  -f/u TTE  -PT recs Patient reports recurrent falls for the past 3 weeks. No clear prodromal symptoms, dizziness, chest pain, palpitation. However, notes his body feels like "jello and shaky". Also endorses paresthesias of feet ongoing for many years.  Fell 3x yesterday, described as "trip and fall", hit his head, wihout LOC, unable to ambulate since the fall. Has been taking diuretics, but not taking midodrine for the past few weeks.   TTE from Aug 2023: Normal LV and RV size and function, no signficant vavular disease, no pulm HTN, mild aortic root dilation    On PE: found to have loss of pripioception bilaterally and LE edema bilaterally  DDX: I/s/o  orthostasis vs neuropathy vs mechanical. Unlikely vasovagal (givel no prodrome), unlikely cardiac (given no chest pain, palpitations, ECG on admission NSR)     Plan:   -f/u orthostatics   -start midodrine 2.5 TID  -f/u A1c, B12, TSH, syphilis   -f/u trop, ck, ckmb  -f/u TTE  -PT recs Patient reports recurrent falls for the past 3 weeks. No clear prodromal symptoms, dizziness, chest pain, palpitation. However, notes his body feels like "jello and shaky". Also endorses paresthesias of feet ongoing for many years.  Fell 3x yesterday, described as "trip and fall", hit his head, wihout LOC, unable to ambulate since the fall. Has been taking diuretics, but not taking midodrine for the past few weeks.   TTE from Aug 2023: Normal LV and RV size and function, no signficant vavular disease, no pulm HTN, mild aortic root dilation    On PE: found to have loss of pripioception bilaterally and LE edema bilaterally  DDX: I/s/o  orthostasis vs neuropathy vs mechanical. Unlikely vasovagal (givel no prodrome), unlikely cardiac (given no chest pain, palpitations, ECG on admission NSR)     Plan:   -f/u orthostatics   -start midodrine 2.5 TID  -f/u A1c, B12, TSH, syphilis   -f/u repeat trop, ck, ckmb  -f/u TTE  -PT recs

## 2024-05-09 NOTE — CONSULT NOTE ADULT - ASSESSMENT
I M    82 y o Male with PMHx of TIA (dx 2011 on plavix daily), BPH, DM, diabetic neuropathy, HLD, orthostasis who presents for evaluation of recurrernt falls for the past 3 weeks, last fall yesterday, hit his head, unable to ambulate since fall admitted for further work-up of recurrent fall and plan for safe disposition.     Problem/Plan - 1:  ·  Problem: Falls.   ·  Plan: Patient reports recurrent falls for the past 3 weeks. No clear prodromal symptoms, dizziness, chest pain, palpitation. However, notes his body feels like "jello and shaky". Also endorses paresthesias of feet ongoing for many years.  Fell 3x yesterday, described as "trip and fall", hit his head, wihout LOC, unable to ambulate since the fall. Has been taking diuretics, but not taking midodrine for the past few weeks.   TTE from Aug 2023: Normal LV and RV size and function, no signficant vavular disease, no pulm HTN, mild aortic root dilation    On PE: found to have loss of pripioception bilaterally and LE edema bilaterally  DDX: I/s/o  orthostasis vs neuropathy vs mechanical. Unlikely vasovagal (givel no prodrome), unlikely cardiac (given no chest pain, palpitations, ECG on admission NSR)     Plan:   -f/u orthostatics   -start midodrine 2.5 TID  -f/u A1c, B12, TSH, syphilis   -f/u trop, ck, ckmb  -f/u TTE  -PT recs.    Problem/Plan - 2:  ·  Problem: Lower extremity edema.   ·  Plan: Bilateral LE edema with chronic skin changes, has been ongoing for years per the patient with pain on palpation diffusely   TTE from august 2023: TTE from Aug 2023: Normal LV and RV size and function, no signficant vavular disease, no pulm HTN, mild aortic root dilation   DDX: Likely i/s/o vascular insufficency, r/o DVT. Low concern for HF (given normal echo 1 year prior) hypoalbumenmia given no anasarca    Plan:   -f/u LE doppler  -f/u TTE  -f/u albumin level, protein/creatinine ratio.    Problem/Plan - 3:  ·  Problem: Hypophosphatemia.   ·  Plan: Found to  have Phsophsorus 1. ECG on admission NSR. Denies diarrhea  DDX: Concern for decreased p.o intake,     Plan:   -replete for Ph<3  -repeat BMP, phosph, mag level.    Problem/Plan - 4:  ·  Problem: Anemia.   ·  Plan: On admission: Hgb 12.6, MCV 89.6, RDW 12.4. Prior Hgb baseline ~12 from August 2023  Denies active signs of bleeding, but reports known hx of microhematuria that was reportedly worked up by urology, without plan for intervention per the patient.       Plan:   -f/u iron study, B12, folate ferritin.    Problem/Plan - 5:  ·  Problem: JOEL (acute kidney injury).   ·  Plan: On admission creatinine 1.5. Baseline creatinine 0.9-1  Likely prerenal i/s/o dehydration   s/p 1L NS     Plan:   -f/u urine studies   -Encourage p.o intake  -Hold off on further IV fluids given LE edema.    Problem/Plan - 6:  ·  Problem: Brain TIA.   ·  Plan: Home med: Plavix 75 and atorvastatin 40 qd    Plan:   -c/w plavix and atorvastatin.    Problem/Plan - 7:  ·  Problem: History of BPH.   ·  Plan: Home med: Finasteride 5 and tamsuolosin 0.4 qd    Plan:   start finasteride 5  hold tamsulosin (given concern for orthstasis).    Problem/Plan - 8:  ·  Problem: DM (diabetes mellitus).   ·  Plan: Home med: Metformin 500 BID and Munjaro injections    Plan:   mISS  f/u Aic    #Diabetic Neuropathy   Home med: Gabapentin 800 BID    Plan:   -Hold gabapentin given joel.    Problem/Plan - 9:  ·  Problem: Anxiety.   ·  Plan: Home med: Bupropion 300 qd and Lorazepam 1mg qd as needed    Plan:   -start Bupropion  -Hold Lorazepam   -CIWA (for benzo withdrawal).    Problem/Plan - 10:  ·  Problem: Prophylactic measure.   ·  Plan; DVT: PPX: Lovenox 40 qd  Repelte: Mg, Ph, K  Diet: Diabetes  Code status: full code  Dispo: F.

## 2024-05-09 NOTE — ED PROVIDER NOTE - SKIN, MLM
Skin normal color for race, warm, dry and intact. No evidence of rash. multiple ecchymosis to anterior left shin, few superficial skin tears to b/l hands

## 2024-05-09 NOTE — PHYSICAL THERAPY INITIAL EVALUATION ADULT - PATIENT PROFILE REVIEW, REHAB EVAL
yes O-L Flap Text: The defect edges were debeveled with a #15 scalpel blade. Given the location of the defect, shape of the defect and the proximity to free margins an O-L flap was deemed most appropriate. Using a sterile surgical marker, an appropriate advancement flap was drawn incorporating the defect and placing the expected incisions within the relaxed skin tension lines where possible. The area thus outlined was incised deep to adipose tissue with a #15 scalpel blade. The skin margins were undermined to an appropriate distance in all directions utilizing iris scissors. Following this, the designed flap was advanced and carried over into the primary defect and sutured into place.

## 2024-05-09 NOTE — H&P ADULT - PROBLEM SELECTOR PLAN 5
On admission creatinine 1.5. Baseline creatinine 0.9-1  Likely prerenal i/s/o dehydration   s/p 1L NS     Plan:   -f/u urine studies   -Encourage p.o intake  -Hold off on further IV fluids given LE edema

## 2024-05-09 NOTE — H&P ADULT - ATTENDING COMMENTS
Patient seen and examined by me. Case discussed with resident and agree with the resident's findings and plan as documented in the resident's note. 82M  h/o TIA (dx 2011 on plavix), BPH, DM type 2, diabetic neuropathy, HLD, orthostasis (on midodrine) p/w 3-week-history of recurrent falls with inability to ambulate complicated by JOEL and hypophosphatemia.    1. Recurrent falls:  -last fall occurred yesterday and patient reports hitting his head  -CT head and spine performed with no acute pathology  -unlikely cardiac in nature given normal EKG; will check 2D-echo  -could be secondary to diabetic neuropathy vs. orthostasis vs. hypoglycemia  -check HgA1c - last A1c was 5% -- could be overmedicated; will call PCP for collateral  -check orthostatics  -check 2D echo and cardiac enzymes for completeness  -continue midodrine  -trend CK  -will consult PT    2. LE edema:  -could be secondary to chronic venous insufficiency  -will check LE dopplers to r/o DVTand 2D echo    3. Hypophosphatemia:  -likely  in the setting of poor PO intake  -will check Vit-D and PTH levels to r/o vitamin D deficiency   -trend BMP with phos    4. JOEL:  -likely 2/2 to pre-renal azotemia (less likely from rhabdo given CK <2500)  -will urine electrolytes and urine creatinine  -trend CK and creatinine   -encourage PO hydration     5. Dispo:  -f/u PT consult  -f/u SW consult Patient seen and examined by me. Case discussed with resident and agree with the resident's findings and plan as documented in the resident's note. 82M  h/o TIA (dx 2011 on plavix), BPH, DM type 2, diabetic neuropathy, HLD, orthostasis (on midodrine) p/w 3-week-history of recurrent falls with inability to ambulate complicated by JOEL and hypophosphatemia.    1. Recurrent falls:  -last fall occurred yesterday and patient reports hitting his head  -CT head and spine performed with no acute pathology  -unlikely cardiac in nature given normal EKG; will check 2D-echo  -could be secondary to diabetic neuropathy vs. orthostasis given non-compliance with midodrine medication vs. hypoglycemia  -check HgA1c - last A1c was 5% -- could be overmedicated; will call PCP for collateral  -check orthostatics  -check 2D echo and repeat cardiac enzyme for completeness  -continue midodrine  -trend CK to r/o rhabdo  -will consult PT    2. LE edema:  -could be secondary to chronic venous insufficiency  -will check LE dopplers to r/o DVT and 2D echo    3. Hypophosphatemia:  -likely  in the setting of poor PO intake; no EKG changes  -will check Vit-D and PTH levels to r/o vitamin D deficiency   -trend BMP with phos    4. JOEL:  -likely 2/2 to pre-renal azotemia (less likely from rhabdo given CPK <2500)  -will urine electrolytes and urine creatinine  -trend CK and creatinine   -encourage PO hydration     5. Dispo:  -f/u PT consult  -f/u SW consult

## 2024-05-09 NOTE — ED ADULT TRIAGE NOTE - ARRIVAL INFO ADDITIONAL COMMENTS
Pt presents to ED s/p fall. On plavix, no LOC, +HS with small bump noted to the back of the head. A&Ox4.

## 2024-05-09 NOTE — PHYSICAL THERAPY INITIAL EVALUATION ADULT - ADDITIONAL COMMENTS
Prior to admission pt reports living alone in an elevator apt, no steps to enter, indep with ADLs and uses cane for mobility indoors and rollator for mobility outdoors.

## 2024-05-09 NOTE — CONSULT NOTE ADULT - SUBJECTIVE AND OBJECTIVE BOX
Patient is a 83y old  Male who presents with a chief complaint of     HPI:  82y Male with PMHx of TIA (dx  on plavix daily), BPH, DM, diabetic neuropathy, HLD, orthostasis who presents for evaluation of recurrernt falls for the past 3 weeks, last fall yesterday.  Pt states yesterday he trip and fell 3 times in his apartment but after last time was unable to get up and called EMS, has not been able to ambulate since the fall. Reports that he his head once yesterday, did not have LOC and is now c/o mild neck pain and some HA.  He denies any prodrome symptoms, chest pain, palpitations, cheanges to vision or focal deficists. Pt reports for the past 3 weeks he feels that his" body is like a jello". He is shaking and frequently unsteady, falling. At baseline, is able to ambulate with walker. Denies fever, chills, abdominal pain, n/v, diarrhea, cough, dysuria, hematuria or hematocheiza. Of note, patient reports he had stopped taking all his medications in the last couple of weeks including the midodrine, as he does not feel like taking too many medications. However, noted he has continued taking finasteride and tamsulosin  Of note patient was admitted in Aug 2023 for gait ataxia that has started suddenly. At the time reported chronic bilateral LE weakness and dizzines, imaging of head was negatice, and he was started on midodrine 7.5 TID, as symptoms were thought to be due to orthostasis. Plan was to continue with plavic monotherapy as symptoms were thought be not be realted to acute stroke and he had not tolerated aspirin in the pas due to GI bleed. He was then discharged to [facility] with plan for cardiac monioring outpatient. Per the patient has not had cardiac monitoring.     In the ED;   VS: T 98.4, /75, HR 65, RR 18, Spo2 98% in RA  Labs: Wbc 8.6, Hgb 12.6, MCV 89.6, RDW 12.4, Plt 143, Na 138, K 3.5, Bicarb 23, creatinine 1.5 BUN 19, Glucose 101. Mg 1.9 , UA: WBC 4, Bacteria negative, Nitirite negatve, LE trace  Imaging:  -CT cervical spine: No acute osseous abnormality of the cervical spine.  -CT head: No intracranial hemorrhage or calvarial fracture.  ECG: NSR, non-ischemic, Qtc 408  Intervnetions: NS 1L    (09 May 2024 07:25)    PAST MEDICAL & SURGICAL HISTORY:  HTN (hypertension)      HLD (hyperlipidemia)      DM (diabetes mellitus)      History of laparoscopic adjustable gastric banding        MEDICATIONS  (STANDING):  atorvastatin 40 milliGRAM(s) Oral at bedtime  cholecalciferol 800 Unit(s) Oral daily  clopidogrel Tablet 75 milliGRAM(s) Oral every 24 hours  cyanocobalamin 1000 MICROGram(s) Oral daily  dextrose 10% Bolus 125 milliLiter(s) IV Bolus once  dextrose 5%. 1000 milliLiter(s) (100 mL/Hr) IV Continuous <Continuous>  dextrose 5%. 1000 milliLiter(s) (50 mL/Hr) IV Continuous <Continuous>  dextrose 50% Injectable 25 Gram(s) IV Push once  dextrose 50% Injectable 12.5 Gram(s) IV Push once  enoxaparin Injectable 40 milliGRAM(s) SubCutaneous every 24 hours  glucagon  Injectable 1 milliGRAM(s) IntraMuscular once  insulin lispro (ADMELOG) corrective regimen sliding scale   SubCutaneous Before meals and at bedtime  midodrine. 2.5 milliGRAM(s) Oral every 8 hours    MEDICATIONS  (PRN):  dextrose Oral Gel 15 Gram(s) Oral once PRN Blood Glucose LESS THAN 70 milliGRAM(s)/deciliter          Home Living Status :  lives alone in an elevator accessible apartment building          -  Home Services :  none         Baseline Functional Level Prior to Admission :             - ADL's/ IADL's :  independent          - Ambulatory status prior to admission :   walked with no assistive devices          FAMILY HISTORY:      CBC Full  -  ( 09 May 2024 03:52 )  WBC Count : 8.65 K/uL  RBC Count : 4.02 M/uL  Hemoglobin : 12.6 g/dL  Hematocrit : 36.0 %  Platelet Count - Automated : 143 K/uL  Mean Cell Volume : 89.6 fl  Mean Cell Hemoglobin : 31.3 pg  Mean Cell Hemoglobin Concentration : 35.0 gm/dL  Auto Neutrophil # : 6.72 K/uL  Auto Lymphocyte # : 1.10 K/uL  Auto Monocyte # : 0.74 K/uL  Auto Eosinophil # : 0.04 K/uL  Auto Basophil # : 0.02 K/uL  Auto Neutrophil % : 77.7 %  Auto Lymphocyte % : 12.7 %  Auto Monocyte % : 8.6 %  Auto Eosinophil % : 0.5 %  Auto Basophil % : 0.2 %          138  |  101  |  19  ----------------------------<  101<H>  3.5   |  23  |  1.50<H>    Ca    9.5      09 May 2024 03:52  Phos  1.0       Mg     1.9             Urinalysis Basic - ( 09 May 2024 03:52 )    Color: Yellow / Appearance: Clear / S.010 / pH: x  Gluc: 101 mg/dL / Ketone: Negative mg/dL  / Bili: Negative / Urobili: 1.0 mg/dL   Blood: x / Protein: Negative mg/dL / Nitrite: Negative   Leuk Esterase: Trace / RBC: 3 /HPF / WBC 4 /HPF   Sq Epi: x / Non Sq Epi: 0 /HPF / Bacteria: Negative /HPF        Radiology :     < from: CT Head No Cont (24 @ 04:17) >    ACC: [id number] EXAM:  CT BRAIN   ORDERED BY: JONI SONG     PROCEDURE DATE:  2024          INTERPRETATION:  PROCEDURE: CT head without intravenous contrast    CLINICAL INDICATION: Status post fall with head injury.    TECHNIQUE:  Multiple axial images were obtained and viewed at 5 mm   intervals from the skull base to the vertex. The images were reviewed in   brain and bone windows. Sagittal and coronal reformations are provided.    COMPARISON EXAMINATION: MR brain from 8/15/2023 and CT head from   2023.    FINDINGS:  VENTRICLES AND SULCI: The ventricles, cisternal spaces, and sulci are   appropriate for patient's age.  INTRA-AXIAL: No intracranial mass, acute hemorrhage, or midline shift is   present. No acute transcortical infarction.  EXTRA-AXIAL: No extra-axial fluid collection is present.  VISUALIZED SINUSES: No air-fluid levels are identified.  VISUALIZED MASTOIDS: Well aerated.  CALVARIUM: No fracture.    IMPRESSION:    No intracranial hemorrhage or calvarial fracture.    < from: CT Cervical Spine No Cont (24 @ 04:18) >    ACC: [id number] EXAM:  CT CERVICAL SPINE   ORDERED BY: JONI SONG     PROCEDURE DATE:  2024          INTERPRETATION:  CERVICAL SPINE CT WITHOUT CONTRAST dated 2024 4:18 AM    CLINICAL STATEMENT: s/p fall, head injury.    TECHNIQUE: Contiguous noncontrast transaxial CT images were obtained   through the cervical spine. Reconstructions were then created in the   axial, sagittal, and coronal planes.    COMPARISON: CTA of the head and neck from 2023.    FINDINGS:    Cervicothoraciclevocurvature is again present. No acute fracture or   traumatic spondylolisthesis is identified. Trace anterolisthesis at C7-T1   is stable. The vertebral body heights are preserved. Multilevel   degenerative disc disease is noted, again greatest atC6-7. No   prevertebral soft tissue swelling is demonstrated.    There is again mild central canal stenosis at a few levels due to   posterior disc osteophyte complexes. Multilevel bony neural foraminal   narrowing related to predominantly uncovertebral spurring is   redemonstrated.    IMPRESSION:    No acute osseous abnormality of the cervical spine.           Review of Systems : per HPI         Vital Signs Last 24 Hrs  T(C): 37 (09 May 2024 06:31), Max: 37 (09 May 2024 06:31)  T(F): 98.6 (09 May 2024 06:31), Max: 98.6 (09 May 2024 06:31)  HR: 65 (09 May 2024 06:31) (65 - 67)  BP: 138/72 (09 May 2024 06:31) (138/72 - 159/75)  BP(mean): --  RR: 18 (09 May 2024 06:31) (18 - 18)  SpO2: 98% (09 May 2024 10:00) (98% - 98%)    Parameters below as of 09 May 2024 10:00  Patient On (Oxygen Delivery Method): room air            Physical Exam:  83 y o man lying comfortably in semi Stack's position , awake , alert , no acute complaints , feels tired     Head: normocephalic , atraumatic    Eyes: PERRLA , EOMI , no nystagmus , sclera anicteric    ENT / FACE: neg nasal discharge , uvula midline , no oropharyngeal erythema / exudate    Neck: supple , negative JVD , negative carotid bruits , no thyromegaly    Chest: CTA bilaterally , neg wheeze / rhonchi / rales / crackles / egophany    Cardiovascular: regular rate and rhythm , neg murmurs / rubs / gallops    Abdomen: soft , non distended , no tenderness to palpation in all 4 quadrants ,  normal bowel sounds     Extremities: 2 + Le edema, chronic venous stasis changes , ttp     Neurologic Exam:     Alert and oriented to person , place , date/year , speech fluent w/o dysarthria     Cranial Nerves:           II:                         pupils equal , round and reactive to light , visual fields intact         III/ IV/VI:             extraocular movements intact , neg nystagmus , neg ptosis        V:                        facial sensation intact , V1-3 normal        VII:                      face symmetric , no droop , normal eye closure and smile        VIII:                     hearing intact to finger rub bilaterally        IX and X:             no hoarseness , gag intact , palate/ uvula rise symmetrically        XI:                       SCM / trapezius strength intact bilateral        XII:                      no tongue deviation    Motor Exam:        > 3+/5 x 4 extremities , without drift     Sensation:         dec distal LE's    DTR:           biceps/brachioradialis: equal                            patella/ankle: equal          neg Babinski         Coordination:            Finger to Nose:  neg dysmetria bilaterally      Gait:  not tested         PM&R Impression: admitted for falls    - no acute pathology on CT imaging    - no focal neurologic deficits       Recommendations / Plan:       1) Physical / Occupational therapy focusing on therapeutic exercises , equipment evaluation , bed mobility/transfer out of bed evaluation , progressive ambulation with assistive devices prn .    2) Current disposition plan recommendation:    pending functional progress

## 2024-05-09 NOTE — PATIENT PROFILE ADULT - FALL HARM RISK - HARM RISK INTERVENTIONS
Assistance with ambulation/Assistance OOB with selected safe patient handling equipment/Communicate Risk of Fall with Harm to all staff/Discuss with provider need for PT consult/Monitor gait and stability/Provide patient with walking aids - walker, cane, crutches/Reinforce activity limits and safety measures with patient and family/Tailored Fall Risk Interventions/Use of alarms - bed, chair and/or voice tab/Visual Cue: Yellow wristband and red socks/Bed in lowest position, wheels locked, appropriate side rails in place/Call bell, personal items and telephone in reach/Instruct patient to call for assistance before getting out of bed or chair/Non-slip footwear when patient is out of bed/Plain to call system/Physically safe environment - no spills, clutter or unnecessary equipment/Purposeful Proactive Rounding/Room/bathroom lighting operational, light cord in reach Assistance with ambulation/Assistance OOB with selected safe patient handling equipment/Communicate Risk of Fall with Harm to all staff/Discuss with provider need for PT consult/Monitor gait and stability/Provide patient with walking aids - walker, cane, crutches/Reinforce activity limits and safety measures with patient and family/Tailored Fall Risk Interventions/Visual Cue: Yellow wristband and red socks/Bed in lowest position, wheels locked, appropriate side rails in place/Call bell, personal items and telephone in reach/Instruct patient to call for assistance before getting out of bed or chair/Non-slip footwear when patient is out of bed/[place] to call system/Physically safe environment - no spills, clutter or unnecessary equipment/Purposeful Proactive Rounding/Room/bathroom lighting operational, light cord in reach

## 2024-05-09 NOTE — ED ADULT NURSE NOTE - NSICDXPASTMEDICALHX_GEN_ALL_CORE_FT
PAST MEDICAL HISTORY:  DM (diabetes mellitus)     History of laparoscopic adjustable gastric banding     HLD (hyperlipidemia)     HTN (hypertension)

## 2024-05-09 NOTE — H&P ADULT - NSHPPHYSICALEXAM_GEN_ALL_CORE
.  VITAL SIGNS:  T(C): 37 (05-09-24 @ 06:31), Max: 37 (05-09-24 @ 06:31)  T(F): 98.6 (05-09-24 @ 06:31), Max: 98.6 (05-09-24 @ 06:31)  HR: 65 (05-09-24 @ 06:31) (65 - 67)  BP: 138/72 (05-09-24 @ 06:31) (138/72 - 159/75)  BP(mean): --  RR: 18 (05-09-24 @ 06:31) (18 - 18)  SpO2: 98% (05-09-24 @ 06:31) (98% - 98%)  Wt(kg): --    PHYSICAL EXAM:    Constitutional: Resting comfortably in bed; NAD  Head: NC/AT  Eyes: PERRL, EOMI, clear conjunctiva  ENT: no nasal discharge; ; MM dry  Neck: supple; no JVD  Respiratory: CTA B/L; no W/R/R, no retractions  Cardiac: +S1/S2; RRR; no M/R/G;  Gastrointestinal: soft, NT/ND; no rebound or guarding; +BSx4  Extremities: WWP, no clubbing or cyanosis; 2+ non pitting edema bilaterally with tenderness on palaption and chronic venous stasis skin changes   Musculoskeletal: NROM x4; no joint swelling, tenderness or erythema  Vascular: 2+ radial, DP/PT pulses B/L  Dermatologic: skin warm, dry and intact; no rashes, wounds, or scars, except as above   Neurologic: AAOx3; follows command, strength 5/5 in UE and LE julissa   Psychiatric: affect and characteristics of appearance, verbalizations, behaviors are appropriate

## 2024-05-09 NOTE — H&P ADULT - PROBLEM SELECTOR PLAN 3
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 ECG on admission NSR. 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 ECG on admission NSR. Denies diarrhea  DDX: Concern for decreased p.o intake,     Plan:   -replete for Ph<3  -repeat BMP, phosph, mag level  -f/u Vit D, PTH, CPK

## 2024-05-09 NOTE — ED PROVIDER NOTE - ATTENDING APP SHARED VISIT CONTRIBUTION OF CARE
83M hx TIA, BPH, dm, high chol, s/p fall. pt states he fell 3 times today. states uses a walker but feels unsteady. no LOC, +hit head. no vomiting. takes plavix.  gen- nad  heent- hematoma to occiput  cv -rrr  lungs -ctab  abd - soft, nt, nd  ext -wwp, pelvis stable  neuro -alert, oriented, dumont  pending CT, PT eval in AM to evaluate pt safe discharge

## 2024-05-10 ENCOUNTER — TRANSCRIPTION ENCOUNTER (OUTPATIENT)
Age: 84
End: 2024-05-10

## 2024-05-10 VITALS
DIASTOLIC BLOOD PRESSURE: 81 MMHG | HEART RATE: 54 BPM | TEMPERATURE: 97 F | RESPIRATION RATE: 17 BRPM | SYSTOLIC BLOOD PRESSURE: 146 MMHG | OXYGEN SATURATION: 96 %

## 2024-05-10 LAB
24R-OH-CALCIDIOL SERPL-MCNC: 22.8 NG/ML — LOW (ref 30–80)
24R-OH-CALCIDIOL SERPL-MCNC: 24.5 NG/ML — LOW (ref 30–80)
A1C WITH ESTIMATED AVERAGE GLUCOSE RESULT: 4.8 % — SIGNIFICANT CHANGE UP (ref 4–5.6)
ADD ON TEST-SPECIMEN IN LAB: SIGNIFICANT CHANGE UP
ADD ON TEST-SPECIMEN IN LAB: SIGNIFICANT CHANGE UP
ALBUMIN SERPL ELPH-MCNC: 3.6 G/DL — SIGNIFICANT CHANGE UP (ref 3.3–5)
ALP SERPL-CCNC: 87 U/L — SIGNIFICANT CHANGE UP (ref 40–120)
ALT FLD-CCNC: 8 U/L — LOW (ref 10–45)
ANION GAP SERPL CALC-SCNC: 13 MMOL/L — SIGNIFICANT CHANGE UP (ref 5–17)
AST SERPL-CCNC: 20 U/L — SIGNIFICANT CHANGE UP (ref 10–40)
BASOPHILS # BLD AUTO: 0.02 K/UL — SIGNIFICANT CHANGE UP (ref 0–0.2)
BASOPHILS NFR BLD AUTO: 0.4 % — SIGNIFICANT CHANGE UP (ref 0–2)
BILIRUB SERPL-MCNC: 0.7 MG/DL — SIGNIFICANT CHANGE UP (ref 0.2–1.2)
BUN SERPL-MCNC: 13 MG/DL — SIGNIFICANT CHANGE UP (ref 7–23)
CALCIUM SERPL-MCNC: 9.1 MG/DL — SIGNIFICANT CHANGE UP (ref 8.4–10.5)
CALCIUM SERPL-MCNC: 9.2 MG/DL — SIGNIFICANT CHANGE UP (ref 8.4–10.5)
CALCIUM SERPL-MCNC: 9.2 MG/DL — SIGNIFICANT CHANGE UP (ref 8.4–10.5)
CHLORIDE SERPL-SCNC: 105 MMOL/L — SIGNIFICANT CHANGE UP (ref 96–108)
CO2 SERPL-SCNC: 23 MMOL/L — SIGNIFICANT CHANGE UP (ref 22–31)
CREAT SERPL-MCNC: 1.22 MG/DL — SIGNIFICANT CHANGE UP (ref 0.5–1.3)
EGFR: 60 ML/MIN/1.73M2 — SIGNIFICANT CHANGE UP
EOSINOPHIL # BLD AUTO: 0.08 K/UL — SIGNIFICANT CHANGE UP (ref 0–0.5)
EOSINOPHIL NFR BLD AUTO: 1.4 % — SIGNIFICANT CHANGE UP (ref 0–6)
ESTIMATED AVERAGE GLUCOSE: 91 MG/DL — SIGNIFICANT CHANGE UP (ref 68–114)
FERRITIN SERPL-MCNC: 74 NG/ML — SIGNIFICANT CHANGE UP (ref 30–400)
FOLATE SERPL-MCNC: 8.4 NG/ML — SIGNIFICANT CHANGE UP
GLUCOSE BLDC GLUCOMTR-MCNC: 75 MG/DL — SIGNIFICANT CHANGE UP (ref 70–99)
GLUCOSE BLDC GLUCOMTR-MCNC: 79 MG/DL — SIGNIFICANT CHANGE UP (ref 70–99)
GLUCOSE BLDC GLUCOMTR-MCNC: 91 MG/DL — SIGNIFICANT CHANGE UP (ref 70–99)
GLUCOSE SERPL-MCNC: 81 MG/DL — SIGNIFICANT CHANGE UP (ref 70–99)
HCT VFR BLD CALC: 36.2 % — LOW (ref 39–50)
HGB BLD-MCNC: 12.5 G/DL — LOW (ref 13–17)
IMM GRANULOCYTES NFR BLD AUTO: 0.2 % — SIGNIFICANT CHANGE UP (ref 0–0.9)
IRON SATN MFR SERPL: 57 UG/DL — SIGNIFICANT CHANGE UP (ref 45–165)
LYMPHOCYTES # BLD AUTO: 1.59 K/UL — SIGNIFICANT CHANGE UP (ref 1–3.3)
LYMPHOCYTES # BLD AUTO: 28.6 % — SIGNIFICANT CHANGE UP (ref 13–44)
MAGNESIUM SERPL-MCNC: 1.8 MG/DL — SIGNIFICANT CHANGE UP (ref 1.6–2.6)
MCHC RBC-ENTMCNC: 31.6 PG — SIGNIFICANT CHANGE UP (ref 27–34)
MCHC RBC-ENTMCNC: 34.5 GM/DL — SIGNIFICANT CHANGE UP (ref 32–36)
MCV RBC AUTO: 91.6 FL — SIGNIFICANT CHANGE UP (ref 80–100)
MONOCYTES # BLD AUTO: 0.48 K/UL — SIGNIFICANT CHANGE UP (ref 0–0.9)
MONOCYTES NFR BLD AUTO: 8.6 % — SIGNIFICANT CHANGE UP (ref 2–14)
NEUTROPHILS # BLD AUTO: 3.38 K/UL — SIGNIFICANT CHANGE UP (ref 1.8–7.4)
NEUTROPHILS NFR BLD AUTO: 60.8 % — SIGNIFICANT CHANGE UP (ref 43–77)
NRBC # BLD: 0 /100 WBCS — SIGNIFICANT CHANGE UP (ref 0–0)
NT-PROBNP SERPL-SCNC: 389 PG/ML — HIGH (ref 0–300)
PHOSPHATE SERPL-MCNC: 3.5 MG/DL — SIGNIFICANT CHANGE UP (ref 2.5–4.5)
PLATELET # BLD AUTO: 143 K/UL — LOW (ref 150–400)
POTASSIUM SERPL-MCNC: 3.7 MMOL/L — SIGNIFICANT CHANGE UP (ref 3.5–5.3)
POTASSIUM SERPL-SCNC: 3.7 MMOL/L — SIGNIFICANT CHANGE UP (ref 3.5–5.3)
PROT SERPL-MCNC: 6.3 G/DL — SIGNIFICANT CHANGE UP (ref 6–8.3)
PTH-INTACT FLD-MCNC: 51 PG/ML — SIGNIFICANT CHANGE UP (ref 15–65)
PTH-INTACT FLD-MCNC: 54 PG/ML — SIGNIFICANT CHANGE UP (ref 15–65)
RBC # BLD: 3.95 M/UL — LOW (ref 4.2–5.8)
RBC # FLD: 12.1 % — SIGNIFICANT CHANGE UP (ref 10.3–14.5)
SODIUM SERPL-SCNC: 141 MMOL/L — SIGNIFICANT CHANGE UP (ref 135–145)
T PALLIDUM AB TITR SER: NEGATIVE — SIGNIFICANT CHANGE UP
TSH SERPL-MCNC: 1.33 UIU/ML — SIGNIFICANT CHANGE UP (ref 0.27–4.2)
VIT B12 SERPL-MCNC: >2000 PG/ML — HIGH (ref 232–1245)
VIT B12 SERPL-MCNC: >2000 PG/ML — HIGH (ref 232–1245)
WBC # BLD: 5.56 K/UL — SIGNIFICANT CHANGE UP (ref 3.8–10.5)
WBC # FLD AUTO: 5.56 K/UL — SIGNIFICANT CHANGE UP (ref 3.8–10.5)

## 2024-05-10 PROCEDURE — 83970 ASSAY OF PARATHORMONE: CPT

## 2024-05-10 PROCEDURE — 93005 ELECTROCARDIOGRAM TRACING: CPT

## 2024-05-10 PROCEDURE — 82553 CREATINE MB FRACTION: CPT

## 2024-05-10 PROCEDURE — 83735 ASSAY OF MAGNESIUM: CPT

## 2024-05-10 PROCEDURE — 70450 CT HEAD/BRAIN W/O DYE: CPT | Mod: MC

## 2024-05-10 PROCEDURE — 83550 IRON BINDING TEST: CPT

## 2024-05-10 PROCEDURE — 85045 AUTOMATED RETICULOCYTE COUNT: CPT

## 2024-05-10 PROCEDURE — 80307 DRUG TEST PRSMV CHEM ANLYZR: CPT

## 2024-05-10 PROCEDURE — 84540 ASSAY OF URINE/UREA-N: CPT

## 2024-05-10 PROCEDURE — 82310 ASSAY OF CALCIUM: CPT

## 2024-05-10 PROCEDURE — 82746 ASSAY OF FOLIC ACID SERUM: CPT

## 2024-05-10 PROCEDURE — 82306 VITAMIN D 25 HYDROXY: CPT

## 2024-05-10 PROCEDURE — 72125 CT NECK SPINE W/O DYE: CPT | Mod: MC

## 2024-05-10 PROCEDURE — 83036 HEMOGLOBIN GLYCOSYLATED A1C: CPT

## 2024-05-10 PROCEDURE — 84100 ASSAY OF PHOSPHORUS: CPT

## 2024-05-10 PROCEDURE — 83540 ASSAY OF IRON: CPT

## 2024-05-10 PROCEDURE — 82550 ASSAY OF CK (CPK): CPT

## 2024-05-10 PROCEDURE — 84484 ASSAY OF TROPONIN QUANT: CPT

## 2024-05-10 PROCEDURE — 82728 ASSAY OF FERRITIN: CPT

## 2024-05-10 PROCEDURE — 82570 ASSAY OF URINE CREATININE: CPT

## 2024-05-10 PROCEDURE — 97161 PT EVAL LOW COMPLEX 20 MIN: CPT

## 2024-05-10 PROCEDURE — 36415 COLL VENOUS BLD VENIPUNCTURE: CPT

## 2024-05-10 PROCEDURE — 93970 EXTREMITY STUDY: CPT | Mod: 26

## 2024-05-10 PROCEDURE — 99239 HOSP IP/OBS DSCHRG MGMT >30: CPT

## 2024-05-10 PROCEDURE — 84300 ASSAY OF URINE SODIUM: CPT

## 2024-05-10 PROCEDURE — 80076 HEPATIC FUNCTION PANEL: CPT

## 2024-05-10 PROCEDURE — 82962 GLUCOSE BLOOD TEST: CPT

## 2024-05-10 PROCEDURE — 99285 EMERGENCY DEPT VISIT HI MDM: CPT

## 2024-05-10 PROCEDURE — 83880 ASSAY OF NATRIURETIC PEPTIDE: CPT

## 2024-05-10 PROCEDURE — 80048 BASIC METABOLIC PNL TOTAL CA: CPT

## 2024-05-10 PROCEDURE — 84443 ASSAY THYROID STIM HORMONE: CPT

## 2024-05-10 PROCEDURE — 80053 COMPREHEN METABOLIC PANEL: CPT

## 2024-05-10 PROCEDURE — 85025 COMPLETE CBC W/AUTO DIFF WBC: CPT

## 2024-05-10 PROCEDURE — 93970 EXTREMITY STUDY: CPT

## 2024-05-10 PROCEDURE — 82607 VITAMIN B-12: CPT

## 2024-05-10 PROCEDURE — 86780 TREPONEMA PALLIDUM: CPT

## 2024-05-10 PROCEDURE — C8929: CPT

## 2024-05-10 PROCEDURE — 84156 ASSAY OF PROTEIN URINE: CPT

## 2024-05-10 PROCEDURE — 81001 URINALYSIS AUTO W/SCOPE: CPT

## 2024-05-10 PROCEDURE — 83935 ASSAY OF URINE OSMOLALITY: CPT

## 2024-05-10 RX ORDER — LUTEIN 20 MG
1 CAPSULE ORAL
Refills: 0 | DISCHARGE

## 2024-05-10 RX ORDER — CHOLECALCIFEROL (VITAMIN D3) 125 MCG
1 CAPSULE ORAL
Qty: 60 | Refills: 0
Start: 2024-05-10 | End: 2024-07-08

## 2024-05-10 RX ORDER — CHOLECALCIFEROL (VITAMIN D3) 125 MCG
1 CAPSULE ORAL
Refills: 0 | DISCHARGE

## 2024-05-10 RX ADMIN — MIDODRINE HYDROCHLORIDE 2.5 MILLIGRAM(S): 2.5 TABLET ORAL at 16:26

## 2024-05-10 RX ADMIN — Medication 800 UNIT(S): at 12:11

## 2024-05-10 RX ADMIN — CLOPIDOGREL BISULFATE 75 MILLIGRAM(S): 75 TABLET, FILM COATED ORAL at 12:11

## 2024-05-10 RX ADMIN — PREGABALIN 1000 MICROGRAM(S): 225 CAPSULE ORAL at 12:11

## 2024-05-10 RX ADMIN — ENOXAPARIN SODIUM 40 MILLIGRAM(S): 100 INJECTION SUBCUTANEOUS at 12:12

## 2024-05-10 NOTE — PROGRESS NOTE ADULT - PROBLEM SELECTOR PLAN 7
Home med: Finasteride 5 and tamsuolosin 0.4 qd    Plan:   -cw finasteride 5  -hold tamsulosin (given concern for orthstasis) Home med: Finasteride 5 and tamsulosin 0.4 qd    Plan:   -cw finasteride 5  -hold tamsulosin (given concern for orthstasis)

## 2024-05-10 NOTE — DISCHARGE NOTE PROVIDER - NSDCFUADDAPPT_GEN_ALL_CORE_FT
Please bring your Insurance card, Photo ID and Discharge paperwork to the following appointment:    (1) Please follow up with Dr. Eduardo Man  on behalf of Dr. Keely Beckett at [address] [place] [address], [address], [place] [address] on 5/17/2024 at 1:30pm.    Appointment was scheduled by Ms. JAIDEN Salazar, Referral Coordinator.

## 2024-05-10 NOTE — DISCHARGE NOTE PROVIDER - NSDCMRMEDTOKEN_GEN_ALL_CORE_FT
atorvastatin 40 mg oral tablet: 1 orally once a day (in the morning)  buPROPion 300 mg/24 hours (XL) oral tablet, extended release: 1 orally once a day (in the morning)  calcium carbonate 1000 mg oral tablet, chewable: 2 chewed once a day (in the morning)  cholecalciferol 25 mcg (1000 intl units) oral capsule: 1 cap(s) orally once a day  clopidogrel 75 mg oral tablet: 1 tablet orally once a day  cyanocobalamin 1000 mcg/mL injectable solution: 1,000 microgram(s) intramuscularly once a week  D3 25 mcg (1000 intl units) oral tablet: 1 tab(s) orally once a day  finasteride 5 mg oral tablet: 1 tablet orally once a day  gabapentin 800 mg oral tablet: 1 orally 2 times a day  Imodium A-D 1 mg/5 mL oral liquid: 10 milliliter(s) orally once a day (at bedtime) as needed for  diarrhea  LORazepam 1 mg oral tablet: 1 tab(s) orally once a day as needed for  anxiety  lutein 20 mg oral capsule: 1 orally  metFORMIN 500 mg oral tablet: 1 tablet orally 2 times a day  midodrine 2.5 mg oral tablet: 3 tab(s) orally 3 times a day  Mounjaro 2.5 mg/0.5 mL subcutaneous solution: 2.5 solution subcutaneously once a week  tamsulosin 0.4 mg oral capsule: 1 capsule orally once a day   atorvastatin 40 mg oral tablet: 1 orally once a day (in the morning)  buPROPion 300 mg/24 hours (XL) oral tablet, extended release: 1 orally once a day (in the morning)  calcium carbonate 1000 mg oral tablet, chewable: 2 chewed once a day (in the morning)  cholecalciferol 25 mcg (1000 intl units) oral capsule: 1 cap(s) orally once a day  clopidogrel 75 mg oral tablet: 1 tablet orally once a day  cyanocobalamin 1000 mcg/mL injectable solution: 1,000 microgram(s) intramuscularly once a week  finasteride 5 mg oral tablet: 1 tablet orally once a day  gabapentin 800 mg oral tablet: 1 orally 2 times a day  Imodium A-D 1 mg/5 mL oral liquid: 10 milliliter(s) orally once a day (at bedtime) as needed for  diarrhea  LORazepam 1 mg oral tablet: 1 tab(s) orally once a day as needed for  anxiety  metFORMIN 500 mg oral tablet: 1 tablet orally 2 times a day  midodrine 2.5 mg oral tablet: 3 tab(s) orally 3 times a day  Mounjaro 2.5 mg/0.5 mL subcutaneous solution: 2.5 solution subcutaneously once a week  tamsulosin 0.4 mg oral capsule: 1 capsule orally once a day

## 2024-05-10 NOTE — DISCHARGE NOTE PROVIDER - PROVIDER TOKENS
PROVIDER:[TOKEN:[[id number]]] PROVIDER:[TOKEN:[[id number]],SCHEDULEDAPPT:[05/17/2024],SCHEDULEDAPPTTIME:[01:30 PM]]

## 2024-05-10 NOTE — PROGRESS NOTE ADULT - SUBJECTIVE AND OBJECTIVE BOX
Patient is a 83y old  Male who presents with a chief complaint of     INTERVAL HPI/OVERNIGHT EVENTS:   NAEO.    SUBJECTIVE:      Vital Signs Last 24 Hrs  T(C): 36.6 (10 May 2024 06:33), Max: 36.7 (09 May 2024 09:00)  T(F): 97.9 (10 May 2024 06:33), Max: 98 (09 May 2024 09:00)  HR: 55 (10 May 2024 06:33) (55 - 86)  BP: 151/68 (10 May 2024 06:33) (96/50 - 151/68)  BP(mean): 95 (10 May 2024 06:33) (95 - 95)  ABP: --  ABP(mean): --  RR: 17 (10 May 2024 06:33) (17 - 18)  SpO2: 94% (10 May 2024 06:33) (94% - 98%)    O2 Parameters below as of 10 May 2024 06:33  Patient On (Oxygen Delivery Method): room air        PHYSICAL EXAM:  Constitutional: Resting comfortably in bed; NAD  Head: NC/AT  Eyes: PERRL, EOMI, clear conjunctiva  ENT: no nasal discharge; ; MM dry  Neck: supple; no JVD  Respiratory: CTA B/L; no W/R/R, no retractions  Cardiac: +S1/S2; RRR; no M/R/G;  Gastrointestinal: soft, NT/ND; no rebound or guarding; +BSx4  Extremities: WWP, no clubbing or cyanosis; 2+ non pitting edema bilaterally with tenderness on palaption and chronic venous stasis skin changes   Musculoskeletal: NROM x4; no joint swelling, tenderness or erythema  Vascular: 2+ radial, DP/PT pulses B/L  Dermatologic: skin warm, dry and intact; no rashes, wounds, or scars, except as above   Neurologic: AAOx3; follows command, strength 5/5 in UE and LE julissa   Psychiatric: affect and characteristics of appearance, verbalizations, behaviors are appropriate      LABS:                        12.5   5.56  )-----------( 143      ( 10 May 2024 05:30 )             36.2     05-10    141  |  105  |  13  ----------------------------<  81  3.7   |  23  |  1.22    Ca    9.2      10 May 2024 05:30  Phos  3.5     05-10  Mg     1.8     05-10    TPro  6.3  /  Alb  3.6  /  TBili  0.7  /  DBili  x   /  AST  20  /  ALT  8<L>  /  AlkPhos  87  05-10      Urinalysis Basic - ( 10 May 2024 05:30 )    Color: x / Appearance: x / SG: x / pH: x  Gluc: 81 mg/dL / Ketone: x  / Bili: x / Urobili: x   Blood: x / Protein: x / Nitrite: x   Leuk Esterase: x / RBC: x / WBC x   Sq Epi: x / Non Sq Epi: x / Bacteria: x      CAPILLARY BLOOD GLUCOSE  POCT Blood Glucose.: 89 mg/dL (09 May 2024 21:55)  POCT Blood Glucose.: 103 mg/dL (09 May 2024 19:02)  POCT Blood Glucose.: 99 mg/dL (09 May 2024 11:04)        RADIOLOGY & ADDITIONAL TESTS:    Consultant(s) Notes Reviewed:  [x ] YES  [ ] NO    MEDICATIONS  (STANDING):  atorvastatin 40 milliGRAM(s) Oral at bedtime  cholecalciferol 800 Unit(s) Oral daily  clopidogrel Tablet 75 milliGRAM(s) Oral every 24 hours  cyanocobalamin 1000 MICROGram(s) Oral daily  dextrose 10% Bolus 125 milliLiter(s) IV Bolus once  dextrose 5%. 1000 milliLiter(s) (100 mL/Hr) IV Continuous <Continuous>  dextrose 5%. 1000 milliLiter(s) (50 mL/Hr) IV Continuous <Continuous>  dextrose 50% Injectable 25 Gram(s) IV Push once  dextrose 50% Injectable 12.5 Gram(s) IV Push once  enoxaparin Injectable 40 milliGRAM(s) SubCutaneous every 24 hours  glucagon  Injectable 1 milliGRAM(s) IntraMuscular once  insulin lispro (ADMELOG) corrective regimen sliding scale   SubCutaneous Before meals and at bedtime  midodrine. 2.5 milliGRAM(s) Oral every 8 hours    MEDICATIONS  (PRN):  dextrose Oral Gel 15 Gram(s) Oral once PRN Blood Glucose LESS THAN 70 milliGRAM(s)/deciliter  LORazepam     Tablet 1 milliGRAM(s) Oral daily PRN Anxiety          Care Discussed with Consultants/Other Providers [ x] YES  [ ] NO Patient is a 83y old Male who presents with a chief complaint of dizziness    INTERVAL HPI/OVERNIGHT EVENTS:   NAEO.      SUBJECTIVE:  Patient seen and examined at bedside. Appears cheerful, no complaint of dizziness when recumbent. Endorses some dizziness when he stands, but he knows to stand slowly and after that the sensation slowly dissapates.      Vital Signs Last 24 Hrs  T(C): 36.6 (10 May 2024 06:33), Max: 36.7 (09 May 2024 09:00)  T(F): 97.9 (10 May 2024 06:33), Max: 98 (09 May 2024 09:00)  HR: 55 (10 May 2024 06:33) (55 - 86)  BP: 151/68 (10 May 2024 06:33) (96/50 - 151/68)  BP(mean): 95 (10 May 2024 06:33) (95 - 95)  ABP: --  ABP(mean): --  RR: 17 (10 May 2024 06:33) (17 - 18)  SpO2: 94% (10 May 2024 06:33) (94% - 98%)    O2 Parameters below as of 10 May 2024 06:33  Patient On (Oxygen Delivery Method): room air        PHYSICAL EXAM:  Constitutional: Resting comfortably in bed; NAD  Head: NC/AT  Eyes: PERRL, EOMI, clear conjunctiva  ENT: no nasal discharge; ; MM dry  Neck: supple; no JVD  Respiratory: CTA B/L; no W/R/R, no retractions  Cardiac: +S1/S2; RRR; no M/R/G;  Gastrointestinal: soft, NT/ND; no rebound or guarding; +BSx4  Extremities: WWP, no clubbing or cyanosis; 2+ non pitting edema bilaterally with tenderness on palaption and chronic venous stasis skin changes   Musculoskeletal: NROM x4; no joint swelling, tenderness or erythema  Vascular: 2+ radial, DP/PT pulses B/L  Dermatologic: skin warm, dry and intact; no rashes, wounds, or scars, except as above   Neurologic: AAOx3; follows command, strength 5/5 in UE and LE julissa   Psychiatric: affect and characteristics of appearance, verbalizations, behaviors are appropriate        LABS:                        12.5   5.56  )-----------( 143      ( 10 May 2024 05:30 )             36.2     05-10    141  |  105  |  13  ----------------------------<  81  3.7   |  23  |  1.22    Ca    9.2      10 May 2024 05:30  Phos  3.5     05-10  Mg     1.8     05-10    TPro  6.3  /  Alb  3.6  /  TBili  0.7  /  DBili  x   /  AST  20  /  ALT  8<L>  /  AlkPhos  87  05-10      Urinalysis Basic - ( 10 May 2024 05:30 )    Color: x / Appearance: x / SG: x / pH: x  Gluc: 81 mg/dL / Ketone: x  / Bili: x / Urobili: x   Blood: x / Protein: x / Nitrite: x   Leuk Esterase: x / RBC: x / WBC x   Sq Epi: x / Non Sq Epi: x / Bacteria: x      CAPILLARY BLOOD GLUCOSE  POCT Blood Glucose.: 89 mg/dL (09 May 2024 21:55)  POCT Blood Glucose.: 103 mg/dL (09 May 2024 19:02)  POCT Blood Glucose.: 99 mg/dL (09 May 2024 11:04)        RADIOLOGY & ADDITIONAL TESTS: Reviewed.    Consultant(s) Notes Reviewed:  [x ] YES  [ ] NO    MEDICATIONS  (STANDING):  atorvastatin 40 milliGRAM(s) Oral at bedtime  cholecalciferol 800 Unit(s) Oral daily  clopidogrel Tablet 75 milliGRAM(s) Oral every 24 hours  cyanocobalamin 1000 MICROGram(s) Oral daily  dextrose 10% Bolus 125 milliLiter(s) IV Bolus once  dextrose 5%. 1000 milliLiter(s) (100 mL/Hr) IV Continuous <Continuous>  dextrose 5%. 1000 milliLiter(s) (50 mL/Hr) IV Continuous <Continuous>  dextrose 50% Injectable 25 Gram(s) IV Push once  dextrose 50% Injectable 12.5 Gram(s) IV Push once  enoxaparin Injectable 40 milliGRAM(s) SubCutaneous every 24 hours  glucagon  Injectable 1 milliGRAM(s) IntraMuscular once  insulin lispro (ADMELOG) corrective regimen sliding scale   SubCutaneous Before meals and at bedtime  midodrine. 2.5 milliGRAM(s) Oral every 8 hours    MEDICATIONS  (PRN):  dextrose Oral Gel 15 Gram(s) Oral once PRN Blood Glucose LESS THAN 70 milliGRAM(s)/deciliter  LORazepam     Tablet 1 milliGRAM(s) Oral daily PRN Anxiety          Care Discussed with Consultants/Other Providers [ x] YES  [ ] NO

## 2024-05-10 NOTE — DISCHARGE NOTE PROVIDER - NSDCFUSCHEDAPPT_GEN_ALL_CORE_FT
[facility]  INTMED [address]  Scheduled Appointment: 05/17/2024

## 2024-05-10 NOTE — PROGRESS NOTE ADULT - PROBLEM SELECTOR PLAN 8
Home med: Metformin 500 BID and Munjaro injections  A1c was 8.0     Plan:   -mISS  -A1c 5 at this admission    #Diabetic Neuropathy   Home med: Gabapentin 800 BID    Plan:   -Hold gabapentin given temitope Home med: Metformin 500 BID and Mounjaro injections  A1c was 8.0     Plan:   -mISS  -A1c 5 at this admission    #Diabetic Neuropathy   Home med: Gabapentin 800 BID    Plan:   -Hold gabapentin given temitope

## 2024-05-10 NOTE — PROGRESS NOTE ADULT - TIME-BASED
[facility]  reports [facility] last [facility] date for Tylenol w/Codeine as 3/26/20 for a 15 d/s. UDS done 6/27/19  CSA signed 8/28/18    Last Visit: 3/3/20 with NP Diego Lux  Next Appointment: return in 4 months  Previous Refill Encounter(s): 1/21/20 Hytone #30g, 3/26/20 Zofran #30 & Tylenol w/Codeine #45    Requested Prescriptions     Pending Prescriptions Disp Refills    hydrocortisone (HYTONE) 2.5 % topical cream 30 g 0     Sig: Apply  to affected area two (2) times a day. use thin layer    ondansetron (ZOFRAN ODT) 8 mg disintegrating tablet 30 Tab 0     Sig: Take 1 Tab by mouth every twelve (12) hours as needed for Nausea for up to 15 days.  acetaminophen-codeine (TYLENOL #3) 300-30 mg per tablet 45 Tab 0     Sig: Take 1 Tab by mouth every eight (8) hours as needed for Pain for up to 15 days. Max Daily Amount: 3 Tabs.
52

## 2024-05-10 NOTE — PROGRESS NOTE ADULT - PROBLEM SELECTOR PLAN 6
Home med: Plavix 75 and atorvastatin 40 qd    Plan:   -c/w plavix and atorvastatin Home med: Plavix 75 and atorvastatin 40 qd    Plan:   -c/w Plavix and atorvastatin

## 2024-05-10 NOTE — PROGRESS NOTE ADULT - ASSESSMENT
82M with PMHx of TIA (dx 2011 on plavix daily), BPH, DM, diabetic neuropathy, HLD, orthostasis who presents for evaluation of recurrernt falls for the past 3 weeks, last fall yesterday, hit his head, unable to ambulate since fall admitted for further work-up of recurrent fall and plan for safe disposition.  82M with PMHx of TIA (dx 2011 on plavix daily), BPH, DM, diabetic neuropathy, HLD, orthostasis who presents for evaluation of recurrent falls for the past 3 weeks, last fall yesterday, hit his head, unable to ambulate since fall admitted for further work-up of recurrent fall and plan for safe disposition.

## 2024-05-10 NOTE — DISCHARGE NOTE PROVIDER - NSDCFUADDINST_GEN_ALL_CORE_FT
Please use compression socks as often as you can after you leave the hospital (you can take them off to shower/bathe). This will help push fluid out of your legs and more into the heart (so that it can perfuse the brain). You   Please use compression socks as often as you can after you leave the hospital (you can take them off to shower/bathe). This will help push fluid out of your legs and more into the heart (so that it can perfuse the brain).

## 2024-05-10 NOTE — PROGRESS NOTE ADULT - PROBLEM SELECTOR PLAN 3
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 ECG on admission NSR. Denies diarrhea  DDX: Concern for decreased p.o intake,     Plan:   -replete for Ph<3  -repeat BMP, phosph, mag level  -f/u Vit D, PTH, CPK Found to have Phosphorus 1. ECG on admission NSR. Denies diarrhea  DDX: Concern for decreased p.o intake,     Plan:   -replete for Ph<3  -repeat BMP, phosph, mag level  -f/u Vit D, PTH, CPK

## 2024-05-10 NOTE — DISCHARGE NOTE NURSING/CASE MANAGEMENT/SOCIAL WORK - PATIENT PORTAL LINK FT
You can access the FollowMyHealth Patient Portal offered by [facility] by registering at the following website: [email or web address]. By joining [facility]’s FollowMyHealth portal, you will also be able to view your health information using other applications (apps) compatible with our system.

## 2024-05-10 NOTE — DISCHARGE NOTE PROVIDER - CARE PROVIDER_API CALL
Gisela Gonzalez  Internal Medicine  [address]  [place] [address]  Phone: [phone]  Fax: [phone]  Follow Up Time:    Im, Keely AYOUB)  Internal Medicine  [address]  [place] [address]  Phone: [phone]  Fax: [phone]  Scheduled Appointment: 05/17/2024 01:30 PM

## 2024-05-10 NOTE — PROGRESS NOTE ADULT - PROBLEM SELECTOR PLAN 2
Bilateral LE edema with chronic skin changes, has been ongoing for years per the patient with pain on palpation diffusely   TTE from august 2023: TTE from Aug 2023: Normal LV and RV size and function, no signficant vavular disease, no pulm HTN, mild aortic root dilation   DDX: Likely i/s/o vascular insufficency, r/o DVT. Low concern for HF (given normal echo 1 year prior) hypoalbumenmia given no anasarca    Plan:   -f/u LE doppler  -f/u TTE --> Grade 1 diastolic dysfunction, otherwise unremarkable. EF 65%.   -f/u albumin level, protein/creatinine ratio Bilateral LE edema with chronic skin changes, has been ongoing for years per the patient with pain on palpation diffusely   TTE from august 2023: TTE from Aug 2023: Normal LV and RV size and function, no significant valvular disease, no pulm HTN, mild aortic root dilation   DDX: Likely i/s/o vascular insufficiency, r/o DVT. Low concern for HF (given normal echo 1 year prior) hypoalbuminemia given no anasarca    Plan:   -f/u LE doppler  -f/u TTE --> Grade 1 diastolic dysfunction, otherwise unremarkable. EF 65%.   -f/u albumin level, protein/creatinine ratio

## 2024-05-10 NOTE — PROGRESS NOTE ADULT - SUBJECTIVE AND OBJECTIVE BOX
Physical Medicine and Rehabilitation Progress Note :       Patient is a 83y old  Male who presents with a chief complaint of     HPI:  82y Male with PMHx of TIA (dx 2011 on plavix daily), BPH, DM, diabetic neuropathy, HLD, orthostasis who presents for evaluation of recurrernt falls for the past 3 weeks, last fall yesterday.  Pt states yesterday he trip and fell 3 times in his apartment but after last time was unable to get up and called EMS, has not been able to ambulate since the fall. Reports that he his head once yesterday, did not have LOC and is now c/o mild neck pain and some HA.  He denies any prodrome symptoms, chest pain, palpitations, cheanges to vision or focal deficists. Pt reports for the past 3 weeks he feels that his" body is like a jello". He is shaking and frequently unsteady, falling. At baseline, is able to ambulate with walker. Denies fever, chills, abdominal pain, n/v, diarrhea, cough, dysuria, hematuria or hematocheiza. Of note, patient reports he had stopped taking all his medications in the last couple of weeks including the midodrine, as he does not feel like taking too many medications. However, noted he has continued taking finasteride and tamsulosin  Of note patient was admitted in Aug 2023 for gait ataxia that has started suddenly. At the time reported chronic bilateral LE weakness and dizzines, imaging of head was negatice, and he was started on midodrine 7.5 TID, as symptoms were thought to be due to orthostasis. Plan was to continue with plavic monotherapy as symptoms were thought be not be realted to acute stroke and he had not tolerated aspirin in the pas due to GI bleed. He was then discharged to [facility] with plan for cardiac monioring outpatient. Per the patient has not had cardiac monitoring.     In the ED;   VS: T 98.4, /75, HR 65, RR 18, Spo2 98% in RA  Labs: Wbc 8.6, Hgb 12.6, MCV 89.6, RDW 12.4, Plt 143, Na 138, K 3.5, Bicarb 23, creatinine 1.5 BUN 19, Glucose 101. Mg 1.9 , UA: WBC 4, Bacteria negative, Nitirite negatve, LE trace  Imaging:  -CT cervical spine: No acute osseous abnormality of the cervical spine.  -CT head: No intracranial hemorrhage or calvarial fracture.  ECG: NSR, non-ischemic, Qtc 408  Intervnetions: NS 1L    (09 May 2024 07:25)                            12.5   5.56  )-----------( 143      ( 10 May 2024 05:30 )             36.2       05-10    141  |  105  |  13  ----------------------------<  81  3.7   |  23  |  1.22    Ca    9.2      10 May 2024 05:30  Phos  3.5     05-10  Mg     1.8     05-10    TPro  6.3  /  Alb  3.6  /  TBili  0.7  /  DBili  x   /  AST  20  /  ALT  8<L>  /  AlkPhos  87  05-10    Vital Signs Last 24 Hrs  T(C): 36.6 (10 May 2024 06:33), Max: 36.7 (09 May 2024 16:40)  T(F): 97.9 (10 May 2024 06:33), Max: 98 (09 May 2024 16:40)  HR: 55 (10 May 2024 06:33) (55 - 64)  BP: 151/68 (10 May 2024 06:33) (128/69 - 151/68)  BP(mean): 95 (10 May 2024 06:33) (95 - 95)  RR: 17 (10 May 2024 06:33) (17 - 18)  SpO2: 94% (10 May 2024 06:33) (94% - 98%)    Parameters below as of 10 May 2024 06:33  Patient On (Oxygen Delivery Method): room air        MEDICATIONS  (STANDING):  atorvastatin 40 milliGRAM(s) Oral at bedtime  cholecalciferol 800 Unit(s) Oral daily  clopidogrel Tablet 75 milliGRAM(s) Oral every 24 hours  cyanocobalamin 1000 MICROGram(s) Oral daily  dextrose 10% Bolus 125 milliLiter(s) IV Bolus once  dextrose 5%. 1000 milliLiter(s) (100 mL/Hr) IV Continuous <Continuous>  dextrose 5%. 1000 milliLiter(s) (50 mL/Hr) IV Continuous <Continuous>  dextrose 50% Injectable 25 Gram(s) IV Push once  dextrose 50% Injectable 12.5 Gram(s) IV Push once  enoxaparin Injectable 40 milliGRAM(s) SubCutaneous every 24 hours  glucagon  Injectable 1 milliGRAM(s) IntraMuscular once  insulin lispro (ADMELOG) corrective regimen sliding scale   SubCutaneous Before meals and at bedtime  midodrine. 2.5 milliGRAM(s) Oral every 8 hours    MEDICATIONS  (PRN):  dextrose Oral Gel 15 Gram(s) Oral once PRN Blood Glucose LESS THAN 70 milliGRAM(s)/deciliter  LORazepam     Tablet 1 milliGRAM(s) Oral daily PRN Anxiety      T(C): 36.6 (05-10-24 @ 06:33), Max: 36.7 (05-09-24 @ 16:40)  HR: 55 (05-10-24 @ 06:33) (55 - 64)  BP: 151/68 (05-10-24 @ 06:33) (128/69 - 151/68)  RR: 17 (05-10-24 @ 06:33) (17 - 18)  SpO2: 94% (05-10-24 @ 06:33) (94% - 98%)        Physical Exam:     83 y o man lying comfortably in semi Stack's position , awake , alert , no acute complaints , feels tired     Head: normocephalic , atraumatic    Eyes: PERRLA , EOMI , no nystagmus , sclera anicteric    ENT / FACE: neg nasal discharge , uvula midline , no oropharyngeal erythema / exudate    Neck: supple , negative JVD , negative carotid bruits , no thyromegaly    Chest: CTA bilaterally , neg wheeze / rhonchi / rales / crackles / egophany    Cardiovascular: regular rate and rhythm , neg murmurs / rubs / gallops    Abdomen: soft , non distended , no tenderness to palpation in all 4 quadrants ,  normal bowel sounds     Extremities: 2 + Le edema, chronic venous stasis changes , ttp     Neurologic Exam:     Alert and oriented to person , place , date/year , speech fluent w/o dysarthria     Cranial Nerves:           II:                         pupils equal , round and reactive to light , visual fields intact         III/ IV/VI:             extraocular movements intact , neg nystagmus , neg ptosis        V:                        facial sensation intact , V1-3 normal        VII:                      face symmetric , no droop , normal eye closure and smile        VIII:                     hearing intact to finger rub bilaterally        IX and X:             no hoarseness , gag intact , palate/ uvula rise symmetrically        XI:                       SCM / trapezius strength intact bilateral        XII:                      no tongue deviation    Motor Exam:        > 3+/5 x 4 extremities , without drift     Sensation:         dec distal LE's    DTR:           biceps/brachioradialis: equal                            patella/ankle: equal          neg Babinski         Coordination:            Finger to Nose:  neg dysmetria bilaterally        Initial Functional Status Assessment :       Previous Level of Function:     · Ambulation Skills	independent; needs device  · Transfer Skills	independent; needs device  · ADL Skills	independent; needs device  · Work/Leisure Activity	independent; needs device  · Additional Comments	Prior to admission pt reports living alone in an elevator apt, no steps to enter, indep with ADLs and uses cane for mobility indoors and rollator for mobility outdoors.    Cognitive Status Examination:   · Orientation	oriented to person, place, time and situation  · Level of Consciousness	alert  · Follows Commands and Answers Questions	100% of the time  · Personal Safety and Judgment	intact  · Short Term Memory	intact  · Long Term Memory	intact    Range of Motion Exam:   · Range of Motion Examination	bilateral upper extremity ROM was WFL (within functional limits); bilateral lower extremity ROM was WFL (within functional limits)    Manual Muscle Testing:   · Manual Muscle Testing Results	no strength deficits were identified    Bed Mobility: Sit to Supine:     · Level of [place]	supervision  · Physical Assist/Nonphysical Assist	1 person assist    Bed Mobility: Supine to Sit:     · Level of [place]	supervision  · Physical Assist/Nonphysical Assist	1 person assist    Transfer: Sit to Stand:     · Level of [place]	supervision  · Physical Assist/Nonphysical Assist	1 person assist  · Weight-Bearing Restrictions	weight-bearing as tolerated  · Assistive Device	rolling walker    Transfer: Stand to Sit:     · Level of [place]	supervision  · Physical Assist/Nonphysical Assist	1 person assist  · Weight-Bearing Restrictions	weight-bearing as tolerated  · Assistive Device	rolling walker    Gait Skills:     · Level of [place]	supervision  · Physical Assist/Nonphysical Assist	1 person assist  · Weight-Bearing Restrictions	weight-bearing as tolerated  · Assistive Device	rolling walker  · Gait Distance	75 feet    Gait Analysis:     · Gait Pattern Used	3-point gait  · Gait Deviations Noted	decreased vicky; decreased step length; decreased stride length; decreased weight-shifting ability  · Impairments Contributing to Gait Deviations	ataxic; impaired motor control; impaired coordination; impaired postural control; decreased strength    Balance Skills Assessment:     · Sitting Balance: Static	good balance  · Sitting Balance: Dynamic	fair balance  · Standing Balance: Static	good balance  · Standing Balance: Dynamic	fair balance  · Systems Impairment Contributing to Balance Disturbance	musculoskeletal; neuromuscular  · Identified Impairments Contributing to Balance Disturbance	impaired motor control; impaired postural control; decreased strength    Sensory Examination:   Sensory Examination:    Grossly Intact:   · Gross Sensory Examination	Grossly Intact      Clinical Impressions:   · Criteria for Skilled Therapeutic Interventions	impairments found; rehab potential; anticipated equipment needs at discharge; therapy frequency; functional limitations in following categories; predicted duration of therapy intervention; anticipated discharge recommendation; risk reduction/prevention  · Impairments Found (describe specific impairments)	aerobic capacity/endurance; gait, locomotion, and balance; muscle strength; posture; gross motor; ergonomics and body mechanics; poor safety awareness; neuromotor development and sensory integration; decreased midline orientation  · Functional Limitations in Following Categories (describe specific limitations)	self-care; home management; community/leisure  · Risk Reduction/Prevention (Describe Specific Areas of risk reduction/prevention)	risk factors  · Risk Areas	fall              PM&R Impression : as above    Current disposition plan recommendation :      d/c home with home physical therapy

## 2024-05-10 NOTE — DISCHARGE NOTE PROVIDER - HOSPITAL COURSE
#Discharge: do not delete    Patient is __ yo M/F with past medical history of _____ presented with _____, found to have _____ (one liner)    Hospital course (by problem):     Patient was discharged to: (home/[facility]/acute rehab/hospice, etc, and with what services – home health PT/RN? Home O2?)    New medications:   Changes to old medications:  Medications that were stopped:    Items to follow up as outpatient:    Physical exam at the time of discharge:       #Discharge: do not delete    Patient is __ yo M/F with past medical history of _____ presented with _____, found to have _____ (one liner)    Hospital course (by problem):    Falls.   ·  Plan: Patient reports recurrent falls for the past 3 weeks. No clear prodromal symptoms, dizziness, chest pain, palpitation. However, notes his body feels like "jello and shaky". Also endorses paresthesias of feet ongoing for many years.  Fell 3x yesterday, described as "trip and fall", hit his head, wihout LOC, unable to ambulate since the fall. Has been taking diuretics, but not taking midodrine for the past few weeks.   TTE from Aug 2023: Normal LV and RV size and function, no signficant vavular disease, no pulm HTN, mild aortic root dilation    On PE: found to have loss of pripioception bilaterally and LE edema bilaterally  DDX: I/s/o  orthostasis vs neuropathy vs mechanical. Unlikely vasovagal (givel no prodrome), unlikely cardiac (given no chest pain, palpitations, ECG on admission NSR)     Plan:   -f/u orthostatics --> prominently orthostatic  -cw midodrine 2.5 TID  -f/u B12, TSH, syphilis   -f/u repeat trop, ck, ckmb --> trop & CKMB WNL, CK downtrending  -f/u TTE --> EF 65%, Grade I left ventricular diastolic dysfunction, otherwise unremarkable  -PT recs.     Problem/Plan - 2:  ·  Problem: Lower extremity edema.   ·  Plan: Bilateral LE edema with chronic skin changes, has been ongoing for years per the patient with pain on palpation diffusely   TTE from august 2023: TTE from Aug 2023: Normal LV and RV size and function, no signficant vavular disease, no pulm HTN, mild aortic root dilation   DDX: Likely i/s/o vascular insufficency, r/o DVT. Low concern for HF (given normal echo 1 year prior) hypoalbumenmia given no anasarca    Plan:   -f/u LE doppler  -f/u TTE --> Grade 1 diastolic dysfunction, otherwise unremarkable. EF 65%.   -f/u albumin level, protein/creatinine ratio.     Problem/Plan - 3:  ·  Problem: Hypophosphatemia.   ·  Plan: Found to  have Phsophsorus 1. ECG on admission NSR. Denies diarrhea  DDX: Concern for decreased p.o intake,     Plan:   -replete for Ph<3  -repeat BMP, phosph, mag level  -f/u Vit D, PTH, CPK.     Problem/Plan - 4:  ·  Problem: Anemia.   ·  Plan: On admission: Hgb 12.6, MCV 89.6, RDW 12.4. Prior Hgb baseline ~12 from August 2023  Denies active signs of bleeding, but reports known hx of microhematuria that was reportedly worked up by urology, without plan for intervention per the patient.       Plan:   -f/u iron study, B12, folate ferritin  -Active type and screen  -transfuse for Hgb<7.     Problem/Plan - 5:  ·  Problem: JOEL (acute kidney injury).   ·  Plan: On admission creatinine 1.5. Baseline creatinine 0.9-1  Likely prerenal i/s/o dehydration   s/p 1L NS     Plan:   -f/u urine studies   -Encourage p.o intake  -Hold off on further IV fluids given LE edema.     Problem/Plan - 6:  ·  Problem: Brain TIA.   ·  Plan: Home med: Plavix 75 and atorvastatin 40 qd    Plan:   -c/w plavix and atorvastatin.     Problem/Plan - 7:  ·  Problem: History of BPH.   ·  Plan: Home med: Finasteride 5 and tamsuolosin 0.4 qd    Plan:   -cw finasteride 5  -hold tamsulosin (given concern for orthstasis).        Patient was discharged to: (home/[facility]/acute rehab/hospice, etc, and with what services – home health PT/RN? Home O2?)    New medications:   Changes to old medications:  Medications that were stopped:    Items to follow up as outpatient:    Physical exam at the time of discharge:       #Discharge: do not delete    Patient is 82M with PMHx of TIA (dx 2011 on plavix daily), BPH, DM, diabetic neuropathy, HLD, orthostasis who presents for evaluation of recurrent falls for the past 3 weeks, last fall yesterday, hit his head, unable to ambulate since fall admitted for further work-up of recurrent fall and plan for safe disposition.     Hospital course (by problem):   #Recurrent Falls  #Hx of Orthostasis  Patient reports recurrent falls for the past 3 weeks. No clear prodromal symptoms, dizziness, chest pain, palpitation. However, notes his body feels like "jello and shaky". Also endorses paresthesias of feet ongoing for many years. Fell 3x yesterday, described as "trip and fall", hit his head, without LOC, unable to ambulate since the fall. Has been taking diuretics, but not taking midodrine for the past few weeks. TTE from Aug 2023: Normal LV and RV size and function, no signficant vavular disease, no pulm HTN, mild aortic root dilation. On PE: found to have loss of proprioception bilaterally and LE edema bilaterally  DDX: I/s/o  orthostasis vs neuropathy vs mechanical. Unlikely vasovagal (givel no prodrome), unlikely cardiac (given no chest pain, palpitations, ECG on admission NSR)     -prominently orthostatic: ace wraps inpatient and compression socks outpatient,   -cw midodrine 2.5 TID  -f/u TTE --> EF 65%, Grade I left ventricular diastolic dysfunction, otherwise unremarkable  -f/u LE Dopplers -->       Hypophosphatemia.   ·  Plan: Found to  have Phsophsorus 1. ECG on admission NSR. Denies diarrhea  DDX: Concern for decreased p.o intake,     Plan:   -replete for Ph<3  -repeat BMP, phosph, mag level  -f/u Vit D, PTH, CPK.     Problem/Plan - 4:  ·  Problem: Anemia.   ·  Plan: On admission: Hgb 12.6, MCV 89.6, RDW 12.4. Prior Hgb baseline ~12 from August 2023  Denies active signs of bleeding, but reports known hx of microhematuria that was reportedly worked up by urology, without plan for intervention per the patient.       Plan:   -f/u iron study, B12, folate ferritin  -Active type and screen  -transfuse for Hgb<7.     Problem/Plan - 5:  ·  Problem: JOEL (acute kidney injury).   ·  Plan: On admission creatinine 1.5. Baseline creatinine 0.9-1  Likely prerenal i/s/o dehydration   s/p 1L NS     Plan:   -f/u urine studies   -Encourage p.o intake  -Hold off on further IV fluids given LE edema.     Problem/Plan - 6:  ·  Problem: Brain TIA.   ·  Plan: Home med: Plavix 75 and atorvastatin 40 qd    Plan:   -c/w plavix and atorvastatin.     Problem/Plan - 7:  ·  Problem: History of BPH.   ·  Plan: Home med: Finasteride 5 and tamsuolosin 0.4 qd    Plan:   -cw finasteride 5  -hold tamsulosin (given concern for orthstasis).        Patient was discharged to: (home/[facility]/acute rehab/hospice, etc, and with what services – home health PT/RN? Home O2?)    New medications:   Changes to old medications:  Medications that were stopped:    Items to follow up as outpatient:    Physical exam at the time of discharge:       #Discharge: do not delete    Patient is 82M with PMH of TIA (dx 2011 on Plavix daily), BPH, DM, diabetic neuropathy, HLD, orthostasis who presents for evaluation of recurrent falls for the past 3 weeks, last fall yesterday, hit his head, unable to ambulate since fall admitted for further work-up of recurrent fall and plan for safe disposition.     Hospital course (by problem):   #Recurrent Falls  #Hx of Orthostasis  Patient reports recurrent falls for the past 3 weeks. No clear prodromal symptoms, dizziness, chest pain, palpitation. However, notes his body feels like "jello and shaky". Also endorses paresthesias of feet ongoing for many years. Fell 3x yesterday, described as "trip and fall", hit his head, without LOC, unable to ambulate since the fall. Has been taking diuretics, but not taking midodrine for the past few weeks. TTE from Aug 2023: Normal LV and RV size and function, no significant valvular disease, no pulm HTN, mild aortic root dilation. On PE: found to have loss of proprioception bilaterally and LE edema bilaterally  DDX: orthostasis vs neuropathy vs mechanical. Unlikely vasovagal (given no prodrome), unlikely cardiac (given no chest pain, palpitations, ECG on admission NSR)     -prominently orthostatic: ace wraps inpatient and compression socks outpatient.   -cw midodrine 2.5 TID  -f/u TTE --> EF 65%, Grade I left ventricular diastolic dysfunction, otherwise unremarkable  -f/u LE Dopplers -->       #Hypophosphatemia  Found to have Phosphorus 1. ECG on admission NSR. Denies diarrhea. DDX: Concern for decreased p.o intake,   - repleted Phos while inpt  - c/w Vitamin D supplementation (at least 800 Units daily)    #Anemia.   On admission: Hgb 12.6, MCV 89.6, RDW 12.4. Prior Hgb baseline ~12 from August 2023  Denies active signs of bleeding, but reports known hx of microhematuria that was reportedly worked up by urology, without plan for intervention per the patient.   -f/u outpt    #JOEL (acute kidney injury).   On admission creatinine 1.5. Baseline creatinine 0.9-1  Likely prerenal i/s/o dehydration   s/p 1L NS, subsequently Cr 1.22  -f/u outpt    #Brain TIA  Home med: Plavix 75 and atorvastatin 40 qd  -c/w plavix and atorvastatin.    #History of BPH.   Home med: Finasteride 5 and Tamsulosin 0.4mg qhs   -cw finasteride 5  -hold tamsulosin (given concern for orthostasis)    #Anxiety.   Home med: Bupropion 300 qd and Lorazepam 1mg qd as needed  CIWA 0   -cw Bupropion   -c.w Lorazepam 0.5 qd prn      Patient was discharged to: home with home PT    New medications: Vitamin D 1000U qd     Items to follow up as outpatient: f/u PCP    Physical exam at the time of discharge:  PHYSICAL EXAM:  Constitutional: Resting comfortably in bed; NAD  Head: NC/AT  Eyes: PERRL, EOMI, clear conjunctiva  ENT: no nasal discharge; ; MM dry  Neck: supple; no JVD  Respiratory: CTA B/L; no W/R/R, no retractions  Cardiac: +S1/S2; RRR; no M/R/G;  Gastrointestinal: soft, NT/ND; no rebound or guarding; +BSx4  Extremities: WWP, no clubbing or cyanosis; 2+ non pitting edema bilaterally with tenderness on palpation and chronic venous stasis skin changes   Musculoskeletal: NROM x4; no joint swelling, tenderness or erythema  Vascular: 2+ radial, DP/PT pulses B/L  Dermatologic: skin warm, dry and intact; no rashes, wounds, or scars, except as above   Neurologic: AAOx3; follows command, strength 5/5 in UE and LE julissa   Psychiatric: affect and characteristics of appearance, verbalizations, behaviors are appropriate     #Discharge: do not delete    Patient is 82M with PMH of TIA (dx 2011 on Plavix daily), BPH, DM, diabetic neuropathy, HLD, orthostasis who presents for evaluation of recurrent falls for the past 3 weeks, last fall yesterday, hit his head, unable to ambulate since fall admitted for further work-up of recurrent fall and plan for safe disposition.     Hospital course (by problem):   #Recurrent Falls  #Hx of Orthostasis  Patient reports recurrent falls for the past 3 weeks. No clear prodromal symptoms, dizziness, chest pain, palpitation. However, notes his body feels like "jello and shaky". Also endorses paresthesias of feet ongoing for many years. Fell 3x yesterday, described as "trip and fall", hit his head, without LOC, unable to ambulate since the fall. Has been taking diuretics, but not taking midodrine for the past few weeks. TTE from Aug 2023: Normal LV and RV size and function, no significant valvular disease, no pulm HTN, mild aortic root dilation. On PE: found to have loss of proprioception bilaterally and LE edema bilaterally  DDX: orthostasis vs neuropathy vs mechanical. Unlikely vasovagal (given no prodrome), unlikely cardiac (given no chest pain, palpitations, ECG on admission NSR)   -prominently orthostatic: ace wraps inpatient and compression socks outpatient.   -cw midodrine 2.5 TID  -f/u TTE --> EF 65%, Grade I left ventricular diastolic dysfunction, otherwise unremarkable    #Hypophosphatemia  Found to have Phosphorus 1. ECG on admission NSR. Denies diarrhea. DDX: Concern for decreased p.o intake,   - repleted Phos while inpt  - c/w Vitamin D supplementation (at least 800 Units daily)    #Anemia.   On admission: Hgb 12.6, MCV 89.6, RDW 12.4. Prior Hgb baseline ~12 from August 2023  Denies active signs of bleeding, but reports known hx of microhematuria that was reportedly worked up by urology, without plan for intervention per the patient.   -f/u outpt    #JOEL (acute kidney injury).   On admission creatinine 1.5. Baseline creatinine 0.9-1  Likely prerenal i/s/o dehydration   s/p 1L NS, subsequently Cr 1.22  -f/u outpt    #Brain TIA  Home med: Plavix 75 and atorvastatin 40 qd  -c/w plavix and atorvastatin.    #History of BPH.   Home med: Finasteride 5 and Tamsulosin 0.4mg qhs   -cw finasteride 5  -hold tamsulosin (given concern for orthostasis)    #Anxiety.   Home med: Bupropion 300 qd and Lorazepam 1mg qd as needed  CIWA 0   -cw Bupropion   -c.w Lorazepam 0.5 qd prn      Patient was discharged to: home with home PT    New medications: Vitamin D 1000U qd     Items to follow up as outpatient: f/u PCP    Physical exam at the time of discharge:  PHYSICAL EXAM:  Constitutional: Resting comfortably in bed; NAD  Head: NC/AT  Eyes: PERRL, EOMI, clear conjunctiva  ENT: no nasal discharge; ; MM dry  Neck: supple; no JVD  Respiratory: CTA B/L; no W/R/R, no retractions  Cardiac: +S1/S2; RRR; no M/R/G;  Gastrointestinal: soft, NT/ND; no rebound or guarding; +BSx4  Extremities: WWP, no clubbing or cyanosis; 2+ non pitting edema bilaterally with tenderness on palpation and chronic venous stasis skin changes   Musculoskeletal: NROM x4; no joint swelling, tenderness or erythema  Vascular: 2+ radial, DP/PT pulses B/L  Dermatologic: skin warm, dry and intact; no rashes, wounds, or scars, except as above   Neurologic: AAOx3; follows command, strength 5/5 in UE and LE julissa   Psychiatric: affect and characteristics of appearance, verbalizations, behaviors are appropriate     #Discharge: do not delete    Patient is 82M with PMH of TIA (dx 2011 on Plavix daily), BPH, DM, diabetic neuropathy, HLD, orthostasis who presents for evaluation of recurrent falls for the past 3 weeks, last fall yesterday, hit his head, unable to ambulate since fall admitted for further work-up of recurrent fall and plan for safe disposition.     Hospital course (by problem):   #Recurrent Falls  #Hx of Orthostasis  Patient reports recurrent falls for the past 3 weeks. No clear prodromal symptoms, dizziness, chest pain, palpitation. However, notes his body feels like "jello and shaky". Also endorses paresthesias of feet ongoing for many years. Fell 3x yesterday, described as "trip and fall", hit his head, without LOC, unable to ambulate since the fall. Has been taking diuretics, but not taking midodrine for the past few weeks. TTE from Aug 2023: Normal LV and RV size and function, no significant valvular disease, no pulm HTN, mild aortic root dilation. On PE: found to have loss of proprioception bilaterally and LE edema bilaterally  DDX: orthostasis vs neuropathy vs mechanical. Unlikely vasovagal (given no prodrome), unlikely cardiac (given no chest pain, palpitations, ECG on admission NSR)   -prominently orthostatic: ace wraps inpatient and compression socks outpatient.   -cw midodrine 2.5 TID  -f/u TTE --> EF 65%, Grade I left ventricular diastolic dysfunction, otherwise unremarkable    #Hypophosphatemia  Found to have Phosphorus 1. ECG on admission NSR. Denies diarrhea. DDX: Concern for decreased p.o intake,   - repleted Phos while inpt  - c/w Vitamin D supplementation (at least 800 Units daily)    #Anemia.   On admission: Hgb 12.6, MCV 89.6, RDW 12.4. Prior Hgb baseline ~12 from August 2023  Denies active signs of bleeding, but reports known hx of microhematuria that was reportedly worked up by urology, without plan for intervention per the patient.   -f/u outpt    #JOEL (acute kidney injury).   On admission creatinine 1.5. Baseline creatinine 0.9-1  Likely prerenal i/s/o dehydration   s/p 1L NS, subsequently Cr 1.22  -f/u outpt    #Brain TIA  Home med: Plavix 75mg and atorvastatin 40mg qd  -c/w Plavix and atorvastatin.    #History of BPH  Home med: Finasteride 5mg and Tamsulosin 0.4mg qhs   - cw Finasteride 5mg qd  - cw Tamsulosin 0.4mg qhs, please hold this medication if you feel dizzy and talk to your doctor     #Anxiety  Home med: Bupropion 300 qd and Lorazepam 1mg qd as needed  CIWA 0   -cw Bupropion   -c.w Lorazepam 0.5 qd prn      Patient was discharged to: home with home PT    New medications: Vitamin D 1000U qd     Items to follow up as outpatient: f/u PCP    Physical exam at the time of discharge:  PHYSICAL EXAM:  Constitutional: Resting comfortably in bed; NAD  Head: NC/AT  Eyes: PERRL, EOMI, clear conjunctiva  ENT: no nasal discharge; ; MM dry  Neck: supple; no JVD  Respiratory: CTA B/L; no W/R/R, no retractions  Cardiac: +S1/S2; RRR; no M/R/G;  Gastrointestinal: soft, NT/ND; no rebound or guarding; +BSx4  Extremities: WWP, no clubbing or cyanosis; 2+ non pitting edema bilaterally with tenderness on palpation and chronic venous stasis skin changes   Musculoskeletal: NROM x4; no joint swelling, tenderness or erythema  Vascular: 2+ radial, DP/PT pulses B/L  Dermatologic: skin warm, dry and intact; no rashes, wounds, or scars, except as above   Neurologic: AAOx3; follows command, strength 5/5 in UE and LE julissa   Psychiatric: affect and characteristics of appearance, verbalizations, behaviors are appropriate

## 2024-05-10 NOTE — PROGRESS NOTE ADULT - ATTENDING COMMENTS
Patient was seen and examined at bedside on 5/10/2024 at 430 pm with friend Zeke present. Patient reports feeling well. Denies SOB, CP, dizziness. ROS is otherwise negative. Vitals, labwork and pertinent imaging reviewed. Exam - NAD, AAO x 4, PERRLA, EOMI, MMM, supple neck, chest - CTA b/l, CV - rrr, s1s2, no m/r/g, no JVD, abd - soft, NTND, + BS, ext - wwp, b/l LE edema + 1 pitting edema, psych - normal affect, skin - no rash    Plan:  -C/w current medications, patient reports that all of his falls have been mechanical in nature  -dopplers  -Add on BNP  -Replete Vitamin D

## 2024-05-10 NOTE — DISCHARGE NOTE PROVIDER - ATTENDING DISCHARGE PHYSICAL EXAMINATION:
Patient was seen and examined at bedside on 5/10/2024 at 430 pm with friend Zeke present. Patient reports feeling well. Denies SOB, CP, dizziness. ROS is otherwise negative. Vitals, labwork and pertinent imaging reviewed. Exam - NAD, AAO x 4, PERRLA, EOMI, MMM, supple neck, chest - CTA b/l, CV - rrr, s1s2, no m/r/g, no JVD, abd - soft, NTND, + BS, ext - wwp, b/l LE edema + 1 pitting edema, psych - normal affect, skin - no rash    Plan:  -C/w current medications, patient reports that all of his falls have been mechanical in nature  -dopplers  -Add on BNP  -Replete Vitamin D  -Patient is medically ready for d/c

## 2024-05-10 NOTE — DISCHARGE NOTE PROVIDER - NSDCCPCAREPLAN_GEN_ALL_CORE_FT
PRINCIPAL DISCHARGE DIAGNOSIS  Diagnosis: Orthostatic hypotension  Assessment and Plan of Treatment: You were admitted with frequent falls in a setting orthostatic hypotension (where your blood pressure falls dramatically after you stand up/sit up rapidly). You should continue to take midodrine after you leave the hospital. Please also stand up slowly and use caution while moving. Also please try to keep compression socks or stockings on during the day, which can help move blood away from your feet and towards the heart which will help better perfuse the brain.

## 2024-05-10 NOTE — PROGRESS NOTE ADULT - PROBLEM SELECTOR PLAN 4
On admission: Hgb 12.6, MCV 89.6, RDW 12.4. Prior Hgb baseline ~12 from August 2023  Denies active signs of bleeding, but reports known hx of microhematuria that was reportedly worked up by urology, without plan for intervention per the patient.       Plan:   -f/u iron study, B12, folate ferritin  -Active type and screen  -transfuse for Hgb<7

## 2024-05-10 NOTE — PROGRESS NOTE ADULT - PROBLEM SELECTOR PLAN 1
Patient reports recurrent falls for the past 3 weeks. No clear prodromal symptoms, dizziness, chest pain, palpitation. However, notes his body feels like "jello and shaky". Also endorses paresthesias of feet ongoing for many years.  Fell 3x yesterday, described as "trip and fall", hit his head, wihout LOC, unable to ambulate since the fall. Has been taking diuretics, but not taking midodrine for the past few weeks.   TTE from Aug 2023: Normal LV and RV size and function, no signficant vavular disease, no pulm HTN, mild aortic root dilation    On PE: found to have loss of pripioception bilaterally and LE edema bilaterally  DDX: I/s/o  orthostasis vs neuropathy vs mechanical. Unlikely vasovagal (givel no prodrome), unlikely cardiac (given no chest pain, palpitations, ECG on admission NSR)     Plan:   -f/u orthostatics --> prominently orthostatic  -cw midodrine 2.5 TID  -f/u B12, TSH, syphilis   -f/u repeat trop, ck, ckmb --> trop & CKMB WNL, CK downtrending  -f/u TTE --> EF 65%, Grade I left ventricular diastolic dysfunction, otherwise unremarkable  -PT recs Patient reports recurrent falls for the past 3 weeks. No clear prodromal symptoms, dizziness, chest pain, palpitation. However, notes his body feels like "jello and shaky". Also endorses paresthesias of feet ongoing for many years.  Fell 3x yesterday, described as "trip and fall", hit his head, wihout LOC, unable to ambulate since the fall. Has been taking diuretics, but not taking midodrine for the past few weeks.   TTE from Aug 2023: Normal LV and RV size and function, no signficant vavular disease, no pulm HTN, mild aortic root dilation    On PE: found to have loss of pripioception bilaterally and LE edema bilaterally  DDX: I/s/o  orthostasis vs neuropathy vs mechanical. Unlikely vasovagal (givel no prodrome), unlikely cardiac (given no chest pain, palpitations, ECG on admission NSR)     Plan:   -f/u orthostatics --> prominently orthostatic  -cw midodrine 2.5 TID, pt states he was not totally compliant at home  -f/u B12, TSH, syphilis   -f/u repeat trop, ck, ckmb --> trop & CKMB WNL, CK downtrending  -f/u TTE --> EF 65%, Grade I left ventricular diastolic dysfunction, otherwise unremarkable  -PT recs

## 2024-05-10 NOTE — DISCHARGE NOTE PROVIDER - CARE PROVIDERS DIRECT ADDRESSES
,[email or web address]

## 2024-05-10 NOTE — DISCHARGE NOTE NURSING/CASE MANAGEMENT/SOCIAL WORK - NSDCPEFALRISK_GEN_ALL_CORE
For information on Fall & Injury Prevention, visit: [email or web address] OR  [email or web address] OR  https://www.cdc.gov/steadi/patient.html

## 2024-05-10 NOTE — PROGRESS NOTE ADULT - ASSESSMENT
I M    82 y o M with PMHx of TIA (dx 2011 on plavix daily), BPH, DM, diabetic neuropathy, HLD, orthostasis who presents for evaluation of recurrernt falls for the past 3 weeks, last fall yesterday, hit his head, unable to ambulate since fall admitted for further work-up of recurrent fall and plan for safe disposition.     Problem/Plan - 1:  ·  Problem: Falls.   ·  Plan: Patient reports recurrent falls for the past 3 weeks. No clear prodromal symptoms, dizziness, chest pain, palpitation. However, notes his body feels like "jello and shaky". Also endorses paresthesias of feet ongoing for many years.  Fell 3x yesterday, described as "trip and fall", hit his head, wihout LOC, unable to ambulate since the fall. Has been taking diuretics, but not taking midodrine for the past few weeks.   TTE from Aug 2023: Normal LV and RV size and function, no signficant vavular disease, no pulm HTN, mild aortic root dilation    On PE: found to have loss of pripioception bilaterally and LE edema bilaterally  DDX: I/s/o  orthostasis vs neuropathy vs mechanical. Unlikely vasovagal (givel no prodrome), unlikely cardiac (given no chest pain, palpitations, ECG on admission NSR)     Plan:   -f/u orthostatics --> prominently orthostatic  -cw midodrine 2.5 TID  -f/u B12, TSH, syphilis   -f/u repeat trop, ck, ckmb --> trop & CKMB WNL, CK downtrending  -f/u TTE --> EF 65%, Grade I left ventricular diastolic dysfunction, otherwise unremarkable  -PT recs.    Problem/Plan - 2:  ·  Problem: Lower extremity edema.   ·  Plan: Bilateral LE edema with chronic skin changes, has been ongoing for years per the patient with pain on palpation diffusely   TTE from august 2023: TTE from Aug 2023: Normal LV and RV size and function, no signficant vavular disease, no pulm HTN, mild aortic root dilation   DDX: Likely i/s/o vascular insufficency, r/o DVT. Low concern for HF (given normal echo 1 year prior) hypoalbumenmia given no anasarca    Plan:   -f/u LE doppler  -f/u TTE --> Grade 1 diastolic dysfunction, otherwise unremarkable. EF 65%.   -f/u albumin level, protein/creatinine ratio.    Problem/Plan - 3:  ·  Problem: Hypophosphatemia.   ·  Plan: Found to  have Phsophsorus 1. ECG on admission NSR. Denies diarrhea  DDX: Concern for decreased p.o intake,     Plan:   -replete for Ph<3  -repeat BMP, phosph, mag level  -f/u Vit D, PTH, CPK.    Problem/Plan - 4:  ·  Problem: Anemia.   ·  Plan: On admission: Hgb 12.6, MCV 89.6, RDW 12.4. Prior Hgb baseline ~12 from August 2023  Denies active signs of bleeding, but reports known hx of microhematuria that was reportedly worked up by urology, without plan for intervention per the patient.       Plan:   -f/u iron study, B12, folate ferritin  -Active type and screen  -transfuse for Hgb<7.    Problem/Plan - 5:  ·  Problem: JOEL (acute kidney injury).   ·  Plan: On admission creatinine 1.5. Baseline creatinine 0.9-1  Likely prerenal i/s/o dehydration   s/p 1L NS     Plan:   -f/u urine studies   -Encourage p.o intake  -Hold off on further IV fluids given LE edema.    Problem/Plan - 6:  ·  Problem: Brain TIA.   ·  Plan: Home med: Plavix 75 and atorvastatin 40 qd    Plan:   -c/w plavix and atorvastatin.    Problem/Plan - 7:  ·  Problem: History of BPH.   ·  Plan: Home med: Finasteride 5 and tamsuolosin 0.4 qd    Plan:   -cw finasteride 5  -hold tamsulosin (given concern for orthstasis).    Problem/Plan - 8:  ·  Problem: DM (diabetes mellitus).   ·  Plan: Home med: Metformin 500 BID and Munjaro injections  A1c was 8.0     Plan:   -mISS  -A1c 5 at this admission    #Diabetic Neuropathy   Home med: Gabapentin 800 BID    Plan:   -Hold gabapentin given joel.    Problem/Plan - 9:  ·  Problem: Anxiety.   ·  Plan: Home med: Bupropion 300 qd and Lorazepam 1mg qd as needed  CIWA 0     Plan:   -cw Bupropion  -c.w Lorazepam 0.5 qd prn  -CIWA (for benzo withdrawal).    Problem/Plan - 10:  ·  Problem: Prophylactic measure.   ·  Plan; DVT: PPX: Lovenox 40 qd  Repelte: Mg, Ph, K  Diet: Diabetes  Code status: full code  Dispo: [facility].

## 2024-05-10 NOTE — PROGRESS NOTE ADULT - PROBLEM SELECTOR PLAN 9
Home med: Bupropion 300 qd and Lorazepam 1mg qd as needed  CIWA 0     Plan:   -cw Bupropion  -c.w Lorazepam 0.5 qd prn  -CIWA (for benzo withdrawal)

## 2024-05-17 ENCOUNTER — APPOINTMENT (OUTPATIENT)
Dept: INTERNAL MEDICINE | Facility: CLINIC | Age: 84
End: 2024-05-17

## 2024-05-22 DIAGNOSIS — Z79.02 LONG TERM (CURRENT) USE OF ANTITHROMBOTICS/ANTIPLATELETS: ICD-10-CM

## 2024-05-22 DIAGNOSIS — I95.1 ORTHOSTATIC HYPOTENSION: ICD-10-CM

## 2024-05-22 DIAGNOSIS — N40.0 BENIGN PROSTATIC HYPERPLASIA WITHOUT LOWER URINARY TRACT SYMPTOMS: ICD-10-CM

## 2024-05-22 DIAGNOSIS — E83.39 OTHER DISORDERS OF PHOSPHORUS METABOLISM: ICD-10-CM
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
## 2024-05-22 DIAGNOSIS — F41.9 ANXIETY DISORDER, UNSPECIFIED: ICD-10-CM

## 2024-05-22 DIAGNOSIS — R29.6 REPEATED FALLS: ICD-10-CM

## 2024-05-22 DIAGNOSIS — Z86.73 PERSONAL HISTORY OF TRANSIENT ISCHEMIC ATTACK (TIA), AND CEREBRAL INFARCTION WITHOUT RESIDUAL DEFICITS: ICD-10-CM

## 2024-05-22 DIAGNOSIS — E11.40 TYPE 2 DIABETES MELLITUS WITH DIABETIC NEUROPATHY, UNSPECIFIED: ICD-10-CM

## 2024-05-22 DIAGNOSIS — E78.5 HYPERLIPIDEMIA, UNSPECIFIED: ICD-10-CM

## 2024-05-22 DIAGNOSIS — T44.4X6A: ICD-10-CM

## 2024-05-22 DIAGNOSIS — E86.0 DEHYDRATION: ICD-10-CM

## 2024-05-22 DIAGNOSIS — Z79.85 LONG-TERM (CURRENT) USE OF INJECTABLE NON-INSULIN ANTIDIABETIC DRUGS: ICD-10-CM

## 2024-05-22 DIAGNOSIS — Z98.84 BARIATRIC SURGERY STATUS: ICD-10-CM

## 2024-05-22 DIAGNOSIS — Z91.148 PATIENT'S OTHER NONCOMPLIANCE WITH MEDICATION REGIMEN FOR OTHER REASON: ICD-10-CM

## 2024-05-22 DIAGNOSIS — E83.42 HYPOMAGNESEMIA: ICD-10-CM

## 2024-05-22 DIAGNOSIS — N17.9 ACUTE KIDNEY FAILURE, UNSPECIFIED: ICD-10-CM

## 2024-05-22 DIAGNOSIS — Z79.84 LONG TERM (CURRENT) USE OF ORAL HYPOGLYCEMIC DRUGS: ICD-10-CM

## 2025-06-02 NOTE — PATIENT PROFILE ADULT - FUNCTIONAL ASSESSMENT - BASIC MOBILITY 5.
----- Message from Jose Alberto Burton MD sent at 5/31/2025  4:42 AM CDT -----  B12 1000 mcg  Folic acid 1mg daily  Ferrous sulfate 325 mg daily   3 = A little assistance

## 2025-07-22 ENCOUNTER — NON-APPOINTMENT (OUTPATIENT)
Age: 85
End: 2025-07-22

## 2025-07-22 ENCOUNTER — APPOINTMENT (OUTPATIENT)
Dept: OPHTHALMOLOGY | Facility: CLINIC | Age: 85
End: 2025-07-22
Payer: MEDICARE

## 2025-07-22 PROCEDURE — 92014 COMPRE OPH EXAM EST PT 1/>: CPT

## 2025-07-22 PROCEDURE — 92020 GONIOSCOPY: CPT

## 2025-07-22 PROCEDURE — 92133 CPTRZD OPH DX IMG PST SGM ON: CPT
